# Patient Record
Sex: MALE | Race: WHITE | Employment: OTHER | URBAN - METROPOLITAN AREA
[De-identification: names, ages, dates, MRNs, and addresses within clinical notes are randomized per-mention and may not be internally consistent; named-entity substitution may affect disease eponyms.]

---

## 2017-03-28 PROBLEM — R91.1 PULMONARY NODULE: Status: ACTIVE | Noted: 2017-03-28

## 2018-03-13 ENCOUNTER — PREP FOR PROCEDURE (OUTPATIENT)
Dept: SURGERY | Age: 58
End: 2018-03-13

## 2018-03-13 DIAGNOSIS — M96.1 FAILED BACK SYNDROME: Primary | ICD-10-CM

## 2018-03-15 RX ORDER — SODIUM CHLORIDE 0.9 % (FLUSH) 0.9 %
10 SYRINGE (ML) INJECTION PRN
Status: CANCELLED | OUTPATIENT
Start: 2018-03-15

## 2018-03-15 RX ORDER — SODIUM CHLORIDE 9 MG/ML
INJECTION, SOLUTION INTRAVENOUS CONTINUOUS
Status: CANCELLED | OUTPATIENT
Start: 2018-03-15

## 2018-03-15 RX ORDER — SODIUM CHLORIDE 0.9 % (FLUSH) 0.9 %
10 SYRINGE (ML) INJECTION EVERY 12 HOURS SCHEDULED
Status: CANCELLED | OUTPATIENT
Start: 2018-03-15

## 2018-03-19 DIAGNOSIS — Z76.0 MEDICATION REFILL: ICD-10-CM

## 2018-03-19 DIAGNOSIS — K21.9 GASTROESOPHAGEAL REFLUX DISEASE WITHOUT ESOPHAGITIS: ICD-10-CM

## 2018-03-19 RX ORDER — LORATADINE 10 MG/1
10 TABLET ORAL DAILY
Qty: 90 TABLET | Refills: 1 | Status: SHIPPED | OUTPATIENT
Start: 2018-03-19 | End: 2019-01-14 | Stop reason: SDUPTHER

## 2018-03-19 RX ORDER — RANITIDINE 150 MG/1
150 TABLET ORAL DAILY
Qty: 90 TABLET | Refills: 1 | Status: SHIPPED | OUTPATIENT
Start: 2018-03-19 | End: 2018-08-27 | Stop reason: DRUGHIGH

## 2018-03-19 RX ORDER — SERTRALINE HYDROCHLORIDE 100 MG/1
100 TABLET, FILM COATED ORAL DAILY
Qty: 90 TABLET | Refills: 1 | Status: SHIPPED | OUTPATIENT
Start: 2018-03-19 | End: 2018-08-27 | Stop reason: DRUGHIGH

## 2018-03-19 RX ORDER — SILDENAFIL CITRATE 20 MG/1
TABLET ORAL
Qty: 60 TABLET | Refills: 0 | Status: SHIPPED | OUTPATIENT
Start: 2018-03-19 | End: 2018-04-13 | Stop reason: SDUPTHER

## 2018-03-26 ENCOUNTER — HOSPITAL ENCOUNTER (OUTPATIENT)
Dept: GENERAL RADIOLOGY | Age: 58
Discharge: HOME | End: 2018-03-28
Attending: NEUROLOGICAL SURGERY
Payer: COMMERCIAL

## 2018-03-26 ENCOUNTER — ANESTHESIA EVENT (OUTPATIENT)
Dept: OPERATING ROOM | Age: 58
End: 2018-03-26
Payer: COMMERCIAL

## 2018-03-26 ENCOUNTER — HOSPITAL ENCOUNTER (OUTPATIENT)
Dept: PREADMISSION TESTING | Age: 58
Discharge: HOME | End: 2018-03-26
Payer: COMMERCIAL

## 2018-03-26 VITALS
HEIGHT: 71 IN | WEIGHT: 226 LBS | HEART RATE: 90 BPM | OXYGEN SATURATION: 96 % | DIASTOLIC BLOOD PRESSURE: 70 MMHG | BODY MASS INDEX: 31.64 KG/M2 | TEMPERATURE: 97.5 F | RESPIRATION RATE: 16 BRPM | SYSTOLIC BLOOD PRESSURE: 114 MMHG

## 2018-03-26 DIAGNOSIS — M96.1 FAILED BACK SYNDROME: ICD-10-CM

## 2018-03-26 LAB
ABO/RH: NORMAL
ANION GAP SERPL CALCULATED.3IONS-SCNC: 15 MMOL/L (ref 7–16)
ANTIBODY SCREEN: NORMAL
BASOPHILS ABSOLUTE: 0.05 E9/L (ref 0–0.2)
BASOPHILS RELATIVE PERCENT: 0.7 % (ref 0–2)
BILIRUBIN URINE: NEGATIVE
BLOOD, URINE: NEGATIVE
BUN BLDV-MCNC: 16 MG/DL (ref 6–20)
CALCIUM SERPL-MCNC: 9 MG/DL (ref 8.6–10.2)
CHLORIDE BLD-SCNC: 101 MMOL/L (ref 98–107)
CLARITY: CLEAR
CO2: 27 MMOL/L (ref 22–29)
COLOR: YELLOW
CREAT SERPL-MCNC: 1.1 MG/DL (ref 0.7–1.2)
EKG ATRIAL RATE: 88 BPM
EKG P AXIS: 23 DEGREES
EKG P-R INTERVAL: 152 MS
EKG Q-T INTERVAL: 368 MS
EKG QRS DURATION: 92 MS
EKG QTC CALCULATION (BAZETT): 445 MS
EKG R AXIS: 8 DEGREES
EKG T AXIS: 19 DEGREES
EKG VENTRICULAR RATE: 88 BPM
EOSINOPHILS ABSOLUTE: 0.39 E9/L (ref 0.05–0.5)
EOSINOPHILS RELATIVE PERCENT: 5.3 % (ref 0–6)
GFR AFRICAN AMERICAN: >60
GFR NON-AFRICAN AMERICAN: >60 ML/MIN/1.73
GLUCOSE BLD-MCNC: 89 MG/DL (ref 74–109)
GLUCOSE URINE: NEGATIVE MG/DL
HCT VFR BLD CALC: 44.4 % (ref 37–54)
HEMOGLOBIN: 15.7 G/DL (ref 12.5–16.5)
IMMATURE GRANULOCYTES #: 0.04 E9/L
IMMATURE GRANULOCYTES %: 0.5 % (ref 0–5)
KETONES, URINE: NEGATIVE MG/DL
LEUKOCYTE ESTERASE, URINE: NEGATIVE
LYMPHOCYTES ABSOLUTE: 1.17 E9/L (ref 1.5–4)
LYMPHOCYTES RELATIVE PERCENT: 16 % (ref 20–42)
MCH RBC QN AUTO: 31.5 PG (ref 26–35)
MCHC RBC AUTO-ENTMCNC: 35.4 % (ref 32–34.5)
MCV RBC AUTO: 89 FL (ref 80–99.9)
MONOCYTES ABSOLUTE: 0.77 E9/L (ref 0.1–0.95)
MONOCYTES RELATIVE PERCENT: 10.5 % (ref 2–12)
NEUTROPHILS ABSOLUTE: 4.91 E9/L (ref 1.8–7.3)
NEUTROPHILS RELATIVE PERCENT: 67 % (ref 43–80)
NITRITE, URINE: NEGATIVE
PDW BLD-RTO: 12.5 FL (ref 11.5–15)
PH UA: 6.5 (ref 5–9)
PLATELET # BLD: 324 E9/L (ref 130–450)
PMV BLD AUTO: 8.8 FL (ref 7–12)
POTASSIUM REFLEX MAGNESIUM: 4.2 MMOL/L (ref 3.5–5)
PROTEIN UA: NEGATIVE MG/DL
RBC # BLD: 4.99 E12/L (ref 3.8–5.8)
SODIUM BLD-SCNC: 143 MMOL/L (ref 132–146)
SPECIFIC GRAVITY UA: 1.02 (ref 1–1.03)
UROBILINOGEN, URINE: 0.2 E.U./DL
WBC # BLD: 7.3 E9/L (ref 4.5–11.5)

## 2018-03-26 PROCEDURE — 86901 BLOOD TYPING SEROLOGIC RH(D): CPT

## 2018-03-26 PROCEDURE — 36415 COLL VENOUS BLD VENIPUNCTURE: CPT

## 2018-03-26 PROCEDURE — 81003 URINALYSIS AUTO W/O SCOPE: CPT

## 2018-03-26 PROCEDURE — 80048 BASIC METABOLIC PNL TOTAL CA: CPT

## 2018-03-26 PROCEDURE — 86850 RBC ANTIBODY SCREEN: CPT

## 2018-03-26 PROCEDURE — 85025 COMPLETE CBC W/AUTO DIFF WBC: CPT

## 2018-03-26 PROCEDURE — 86900 BLOOD TYPING SEROLOGIC ABO: CPT

## 2018-03-26 PROCEDURE — 71046 X-RAY EXAM CHEST 2 VIEWS: CPT

## 2018-03-26 RX ORDER — METOPROLOL SUCCINATE 25 MG/1
25 TABLET, EXTENDED RELEASE ORAL NIGHTLY
COMMUNITY
End: 2019-01-14 | Stop reason: SDUPTHER

## 2018-03-26 RX ORDER — NITROGLYCERIN 0.4 MG/1
0.4 TABLET SUBLINGUAL EVERY 5 MIN PRN
COMMUNITY
End: 2018-05-30

## 2018-03-26 ASSESSMENT — PAIN DESCRIPTION - PROGRESSION: CLINICAL_PROGRESSION: GRADUALLY WORSENING

## 2018-03-26 ASSESSMENT — PAIN DESCRIPTION - LOCATION: LOCATION: BACK

## 2018-03-26 ASSESSMENT — PAIN DESCRIPTION - FREQUENCY: FREQUENCY: INTERMITTENT

## 2018-03-26 ASSESSMENT — PAIN DESCRIPTION - DESCRIPTORS: DESCRIPTORS: NUMBNESS;SHARP;THROBBING

## 2018-03-26 ASSESSMENT — PAIN SCALES - GENERAL: PAINLEVEL_OUTOF10: 5

## 2018-03-26 NOTE — PROGRESS NOTES
Shayla 38 PRE-ADMISSION TESTING GENERAL INSTRUCTIONS- [facility]-phone number:[phone]    GENERAL INSTRUCTIONS  [x] Antibacterial Soap shower Night before and/or AM of Surgery  [] Perry wipe instruction sheet and wipes given. [x] Nothing by mouth after midnight, including gum, candy, mints, or water. [x] You may brush your teeth, gargle, but do NOT swallow water. []Hibiclens shower  the night before and the morning of surgery. Do not use             Hibiclens on your face or head. []No smoking, chewing tobacco, illegal drugs, or alcohol within 24 hours of your surgery. [x] Jewelry, valuables or body piercing's should not be brought to the hospital. All body and/or tongue piercing's must be removed prior to arriving to hospital.  ALL hair pins must be removed. [x] Do not wear makeup, lotions, powders, deodorant. Nail polish as directed by the nurse. [x] Arrange transportation to and from the hospital.  Arrange for someone to be with you for the remainder of the day and for 24 hours after your procedure due to having had anesthesia. [] Bring insurance card and photo ID. [x] Transfusion Bracelet: Please bring with you to hospital, day of surgery  [] Bring urine specimen day of surgery. Any small container is acceptable. [] Use inhalers the morning of surgery and bring with you to hospital.   []Bring copy of living will or healthcare power of  papers to be placed in your electronic record. [] CPAP/BI-PAP: Please bring your machine if you are to spend the night in the hospital.     ENDOSCOPY INSTRUCTIONS:   [] Bowel prep instructions reviewed. [] Nothing by mouth after midnight, including gum, candy, mints, or water.  [] You may brush your teeth, gargle, but do NOT swallow water. [] Do not wear makeup, lotions, powders, deodorant. Nail polish as directed by the nurse.   [] Arrange transportation to and from the hospital.  Arrange for someone to be with you for the

## 2018-03-26 NOTE — ANESTHESIA PRE PROCEDURE
(HYDRODIURIL) 12.5 MG tablet Take 1 tablet by mouth daily 30 tablet 5    polyvinyl alcohol (ARTIFICIAL TEARS) 1.4 % ophthalmic solution PLACE 2 DROPS INTO BOTH EYES 3 TIMES A DAY AS NEEDED 15 mL 0    EPINEPHrine (EPIPEN 2-EVANGELISTA) 0.3 MG/0.3ML SOAJ injection Inject 0.3 mLs into the muscle once as needed (anaphylaxis) Use as directed for allergic reaction 1 each 1    ketorolac (ACULAR) 0.5 % ophthalmic solution Place 1 drop into both eyes 4 times daily 1 Bottle 0    hydrocortisone 2.5 % ointment APPLY TOPICALLY TO AFFTECTED ARE TWICE DAILY 28.35 g 3    amphetamine-dextroamphetamine (ADDERALL) 20 MG tablet Take 30 mg by mouth 2 times daily  . No current facility-administered medications for this encounter. Allergies: Allergies   Allergen Reactions    Dye [Iodides] Other (See Comments)     Patient reports father had an anaphylactic reaction to iodide which result in death    Iodine Solution [Povidone Iodine] Rash     TOPICAL AND INJECTED    Topical Sulfur Rash       Problem List:    Patient Active Problem List   Diagnosis Code    Hypercholesteremia E78.00    Depression (emotion) F32.9    Lumbar disc disease with radiculopathy M51.16    Chronic radicular low back pain M54.16, G89.29    Seasonal allergies J30.2    Hypertension I10    Acid reflux K21.9    Left elbow fracture S42.402A    Fracture dislocation of elbow joint S42.409A    Pulmonary nodule R91.1       Past Medical History:        Diagnosis Date    Acute drug-induced gout of left foot 8/29/2015    Had gout attack on 7/18/15. Seems to be HCTZ induced.       ADHD (attention deficit hyperactivity disorder)     Back pain, chronic     Depression     Hyperlipidemia     Hypertension     MRSA (methicillin resistant staph aureus) culture positive     Seasonal allergies        Past Surgical History:        Procedure Laterality Date    APPENDECTOMY  1972    COLONOSCOPY  2/1/2013    sigmoid diverticulosis, Dr. Dawn López, [facility]

## 2018-03-28 ENCOUNTER — APPOINTMENT (OUTPATIENT)
Dept: GENERAL RADIOLOGY | Age: 58
End: 2018-03-28
Attending: NEUROLOGICAL SURGERY
Payer: COMMERCIAL

## 2018-03-28 ENCOUNTER — HOSPITAL ENCOUNTER (OUTPATIENT)
Age: 58
Setting detail: OUTPATIENT SURGERY
Discharge: HOME | End: 2018-03-28
Attending: NEUROLOGICAL SURGERY | Admitting: NEUROLOGICAL SURGERY
Payer: COMMERCIAL

## 2018-03-28 ENCOUNTER — ANESTHESIA (OUTPATIENT)
Dept: OPERATING ROOM | Age: 58
End: 2018-03-28
Payer: COMMERCIAL

## 2018-03-28 VITALS
BODY MASS INDEX: 31.64 KG/M2 | SYSTOLIC BLOOD PRESSURE: 132 MMHG | HEART RATE: 82 BPM | RESPIRATION RATE: 18 BRPM | OXYGEN SATURATION: 97 % | WEIGHT: 226 LBS | DIASTOLIC BLOOD PRESSURE: 76 MMHG | HEIGHT: 71 IN | TEMPERATURE: 97.8 F

## 2018-03-28 VITALS
SYSTOLIC BLOOD PRESSURE: 118 MMHG | RESPIRATION RATE: 8 BRPM | TEMPERATURE: 95.5 F | OXYGEN SATURATION: 100 % | DIASTOLIC BLOOD PRESSURE: 67 MMHG

## 2018-03-28 DIAGNOSIS — G89.29 CHRONIC RADICULAR LOW BACK PAIN: Chronic | ICD-10-CM

## 2018-03-28 DIAGNOSIS — M54.16 CHRONIC RADICULAR LOW BACK PAIN: Chronic | ICD-10-CM

## 2018-03-28 DIAGNOSIS — M54.5 LOW BACK PAIN, UNSPECIFIED BACK PAIN LATERALITY, UNSPECIFIED CHRONICITY, WITH SCIATICA PRESENCE UNSPECIFIED: ICD-10-CM

## 2018-03-28 DIAGNOSIS — M96.1 FAILED BACK SYNDROME: Primary | ICD-10-CM

## 2018-03-28 PROCEDURE — 76000 FLUOROSCOPY <1 HR PHYS/QHP: CPT

## 2018-03-28 PROCEDURE — 6360000002 HC RX W HCPCS: Performed by: PHYSICIAN ASSISTANT

## 2018-03-28 PROCEDURE — 6370000000 HC RX 637 (ALT 250 FOR IP): Performed by: NEUROLOGICAL SURGERY

## 2018-03-28 PROCEDURE — 3600000013 HC SURGERY LEVEL 3 ADDTL 15MIN: Performed by: NEUROLOGICAL SURGERY

## 2018-03-28 PROCEDURE — C1787 PATIENT PROGR, NEUROSTIM: HCPCS | Performed by: NEUROLOGICAL SURGERY

## 2018-03-28 PROCEDURE — 2500000003 HC RX 250 WO HCPCS: Performed by: NEUROLOGICAL SURGERY

## 2018-03-28 PROCEDURE — 3600000003 HC SURGERY LEVEL 3 BASE: Performed by: NEUROLOGICAL SURGERY

## 2018-03-28 PROCEDURE — 88300 SURGICAL PATH GROSS: CPT

## 2018-03-28 PROCEDURE — C1898 LEAD, PMKR, OTHER THAN TRANS: HCPCS | Performed by: NEUROLOGICAL SURGERY

## 2018-03-28 PROCEDURE — 6360000002 HC RX W HCPCS: Performed by: NEUROLOGICAL SURGERY

## 2018-03-28 PROCEDURE — 6360000002 HC RX W HCPCS

## 2018-03-28 PROCEDURE — 2709999900 HC NON-CHARGEABLE SUPPLY: Performed by: NEUROLOGICAL SURGERY

## 2018-03-28 PROCEDURE — 2500000003 HC RX 250 WO HCPCS: Performed by: NURSE ANESTHETIST, CERTIFIED REGISTERED

## 2018-03-28 PROCEDURE — C1751 CATH, INF, PER/CENT/MIDLINE: HCPCS | Performed by: NEUROLOGICAL SURGERY

## 2018-03-28 PROCEDURE — 3700000000 HC ANESTHESIA ATTENDED CARE: Performed by: NEUROLOGICAL SURGERY

## 2018-03-28 PROCEDURE — 63688 REV/RMV IMP SP NPG/R DTCH CN: CPT | Performed by: NEUROLOGICAL SURGERY

## 2018-03-28 PROCEDURE — 3700000001 HC ADD 15 MINUTES (ANESTHESIA): Performed by: NEUROLOGICAL SURGERY

## 2018-03-28 PROCEDURE — 2580000003 HC RX 258: Performed by: NURSE ANESTHETIST, CERTIFIED REGISTERED

## 2018-03-28 PROCEDURE — 6360000002 HC RX W HCPCS: Performed by: NURSE ANESTHETIST, CERTIFIED REGISTERED

## 2018-03-28 PROCEDURE — 63664 REVISE SPINE ELTRD PLATE: CPT | Performed by: NEUROLOGICAL SURGERY

## 2018-03-28 PROCEDURE — 63664 REVISE SPINE ELTRD PLATE: CPT | Performed by: PHYSICIAN ASSISTANT

## 2018-03-28 PROCEDURE — 7100000001 HC PACU RECOVERY - ADDTL 15 MIN: Performed by: NEUROLOGICAL SURGERY

## 2018-03-28 PROCEDURE — 7100000010 HC PHASE II RECOVERY - FIRST 15 MIN: Performed by: NEUROLOGICAL SURGERY

## 2018-03-28 PROCEDURE — 7100000011 HC PHASE II RECOVERY - ADDTL 15 MIN: Performed by: NEUROLOGICAL SURGERY

## 2018-03-28 PROCEDURE — L8689 EXTERNAL RECHARG SYS INTERN: HCPCS | Performed by: NEUROLOGICAL SURGERY

## 2018-03-28 PROCEDURE — 2580000003 HC RX 258: Performed by: PHYSICIAN ASSISTANT

## 2018-03-28 PROCEDURE — 2580000003 HC RX 258: Performed by: NEUROLOGICAL SURGERY

## 2018-03-28 PROCEDURE — C1767 GENERATOR, NEURO NON-RECHARG: HCPCS | Performed by: NEUROLOGICAL SURGERY

## 2018-03-28 PROCEDURE — 6360000002 HC RX W HCPCS: Performed by: ANESTHESIOLOGY

## 2018-03-28 PROCEDURE — C1820 GENERATOR NEURO RECHG BAT SY: HCPCS | Performed by: NEUROLOGICAL SURGERY

## 2018-03-28 PROCEDURE — 2780000010 HC IMPLANT OTHER: Performed by: NEUROLOGICAL SURGERY

## 2018-03-28 PROCEDURE — 7100000000 HC PACU RECOVERY - FIRST 15 MIN: Performed by: NEUROLOGICAL SURGERY

## 2018-03-28 DEVICE — DEVICE NEUROSTIMULATOR 13.9CC W1.9XH2.2IN 29.1GM RECHRG: Type: IMPLANTABLE DEVICE | Site: BACK | Status: FUNCTIONAL

## 2018-03-28 DEVICE — KIT LD L65CM 5-6-5 SPC MRI COMPATIBLE NEUROSTIM FOR CHRONIC: Type: IMPLANTABLE DEVICE | Site: BACK | Status: FUNCTIONAL

## 2018-03-28 RX ORDER — BUPIVACAINE HYDROCHLORIDE 2.5 MG/ML
INJECTION, SOLUTION EPIDURAL; INFILTRATION; INTRACAUDAL PRN
Status: DISCONTINUED | OUTPATIENT
Start: 2018-03-28 | End: 2018-03-28 | Stop reason: HOSPADM

## 2018-03-28 RX ORDER — MORPHINE SULFATE 4 MG/ML
4 INJECTION, SOLUTION INTRAMUSCULAR; INTRAVENOUS
Status: DISCONTINUED | OUTPATIENT
Start: 2018-03-28 | End: 2018-03-28 | Stop reason: HOSPADM

## 2018-03-28 RX ORDER — OXYCODONE HYDROCHLORIDE AND ACETAMINOPHEN 5; 325 MG/1; MG/1
2 TABLET ORAL EVERY 4 HOURS PRN
Qty: 56 TABLET | Refills: 0 | Status: SHIPPED | OUTPATIENT
Start: 2018-03-28 | End: 2018-04-04

## 2018-03-28 RX ORDER — SODIUM CHLORIDE 9 MG/ML
INJECTION, SOLUTION INTRAVENOUS CONTINUOUS
Status: DISCONTINUED | OUTPATIENT
Start: 2018-03-28 | End: 2018-03-28 | Stop reason: HOSPADM

## 2018-03-28 RX ORDER — ROCURONIUM BROMIDE 10 MG/ML
INJECTION, SOLUTION INTRAVENOUS PRN
Status: DISCONTINUED | OUTPATIENT
Start: 2018-03-28 | End: 2018-03-28 | Stop reason: SDUPTHER

## 2018-03-28 RX ORDER — CEPHALEXIN 500 MG/1
500 CAPSULE ORAL 2 TIMES DAILY
Qty: 14 CAPSULE | Refills: 0 | Status: SHIPPED | OUTPATIENT
Start: 2018-03-28 | End: 2018-03-28

## 2018-03-28 RX ORDER — MORPHINE SULFATE 2 MG/ML
1 INJECTION, SOLUTION INTRAMUSCULAR; INTRAVENOUS EVERY 5 MIN PRN
Status: DISCONTINUED | OUTPATIENT
Start: 2018-03-28 | End: 2018-03-28 | Stop reason: HOSPADM

## 2018-03-28 RX ORDER — CEPHALEXIN 500 MG/1
500 CAPSULE ORAL 2 TIMES DAILY
Qty: 14 CAPSULE | Refills: 0 | Status: SHIPPED | OUTPATIENT
Start: 2018-03-28 | End: 2018-04-04

## 2018-03-28 RX ORDER — PROPOFOL 10 MG/ML
INJECTION, EMULSION INTRAVENOUS PRN
Status: DISCONTINUED | OUTPATIENT
Start: 2018-03-28 | End: 2018-03-28 | Stop reason: SDUPTHER

## 2018-03-28 RX ORDER — HYDROMORPHONE HCL 110MG/55ML
0.25 PATIENT CONTROLLED ANALGESIA SYRINGE INTRAVENOUS EVERY 5 MIN PRN
Status: DISCONTINUED | OUTPATIENT
Start: 2018-03-28 | End: 2018-03-28 | Stop reason: HOSPADM

## 2018-03-28 RX ORDER — DEXAMETHASONE SODIUM PHOSPHATE 10 MG/ML
INJECTION, SOLUTION INTRAMUSCULAR; INTRAVENOUS PRN
Status: DISCONTINUED | OUTPATIENT
Start: 2018-03-28 | End: 2018-03-28 | Stop reason: SDUPTHER

## 2018-03-28 RX ORDER — CYCLOBENZAPRINE HCL 10 MG
10 TABLET ORAL 3 TIMES DAILY PRN
Qty: 90 TABLET | Refills: 2 | Status: SHIPPED | OUTPATIENT
Start: 2018-03-28 | End: 2018-04-05 | Stop reason: SDUPTHER

## 2018-03-28 RX ORDER — MIDAZOLAM HYDROCHLORIDE 1 MG/ML
INJECTION INTRAMUSCULAR; INTRAVENOUS PRN
Status: DISCONTINUED | OUTPATIENT
Start: 2018-03-28 | End: 2018-03-28 | Stop reason: SDUPTHER

## 2018-03-28 RX ORDER — MORPHINE SULFATE 2 MG/ML
INJECTION, SOLUTION INTRAMUSCULAR; INTRAVENOUS
Status: COMPLETED
Start: 2018-03-28 | End: 2018-03-28

## 2018-03-28 RX ORDER — HYDROMORPHONE HCL 110MG/55ML
0.5 PATIENT CONTROLLED ANALGESIA SYRINGE INTRAVENOUS EVERY 5 MIN PRN
Status: DISCONTINUED | OUTPATIENT
Start: 2018-03-28 | End: 2018-03-28 | Stop reason: HOSPADM

## 2018-03-28 RX ORDER — NEOSTIGMINE METHYLSULFATE 1 MG/ML
INJECTION, SOLUTION INTRAVENOUS PRN
Status: DISCONTINUED | OUTPATIENT
Start: 2018-03-28 | End: 2018-03-28 | Stop reason: SDUPTHER

## 2018-03-28 RX ORDER — SODIUM CHLORIDE 0.9 % (FLUSH) 0.9 %
10 SYRINGE (ML) INJECTION EVERY 12 HOURS SCHEDULED
Status: DISCONTINUED | OUTPATIENT
Start: 2018-03-28 | End: 2018-03-28 | Stop reason: HOSPADM

## 2018-03-28 RX ORDER — OXYCODONE HYDROCHLORIDE AND ACETAMINOPHEN 5; 325 MG/1; MG/1
2 TABLET ORAL EVERY 4 HOURS PRN
Status: DISCONTINUED | OUTPATIENT
Start: 2018-03-28 | End: 2018-03-28 | Stop reason: HOSPADM

## 2018-03-28 RX ORDER — LIDOCAINE HYDROCHLORIDE 20 MG/ML
INJECTION, SOLUTION EPIDURAL; INFILTRATION; INTRACAUDAL; PERINEURAL PRN
Status: DISCONTINUED | OUTPATIENT
Start: 2018-03-28 | End: 2018-03-28 | Stop reason: SDUPTHER

## 2018-03-28 RX ORDER — SUCCINYLCHOLINE CHLORIDE 20 MG/ML
INJECTION INTRAMUSCULAR; INTRAVENOUS PRN
Status: DISCONTINUED | OUTPATIENT
Start: 2018-03-28 | End: 2018-03-28 | Stop reason: SDUPTHER

## 2018-03-28 RX ORDER — VANCOMYCIN HYDROCHLORIDE 500 MG/10ML
INJECTION, POWDER, LYOPHILIZED, FOR SOLUTION INTRAVENOUS PRN
Status: DISCONTINUED | OUTPATIENT
Start: 2018-03-28 | End: 2018-03-28 | Stop reason: HOSPADM

## 2018-03-28 RX ORDER — SODIUM CHLORIDE 0.9 % (FLUSH) 0.9 %
10 SYRINGE (ML) INJECTION PRN
Status: DISCONTINUED | OUTPATIENT
Start: 2018-03-28 | End: 2018-03-28 | Stop reason: HOSPADM

## 2018-03-28 RX ORDER — ONDANSETRON 2 MG/ML
INJECTION INTRAMUSCULAR; INTRAVENOUS PRN
Status: DISCONTINUED | OUTPATIENT
Start: 2018-03-28 | End: 2018-03-28 | Stop reason: SDUPTHER

## 2018-03-28 RX ORDER — FENTANYL CITRATE 50 UG/ML
INJECTION, SOLUTION INTRAMUSCULAR; INTRAVENOUS PRN
Status: DISCONTINUED | OUTPATIENT
Start: 2018-03-28 | End: 2018-03-28 | Stop reason: SDUPTHER

## 2018-03-28 RX ORDER — GLYCOPYRROLATE 1 MG/5 ML
SYRINGE (ML) INTRAVENOUS PRN
Status: DISCONTINUED | OUTPATIENT
Start: 2018-03-28 | End: 2018-03-28 | Stop reason: SDUPTHER

## 2018-03-28 RX ORDER — SODIUM CHLORIDE, SODIUM LACTATE, POTASSIUM CHLORIDE, CALCIUM CHLORIDE 600; 310; 30; 20 MG/100ML; MG/100ML; MG/100ML; MG/100ML
INJECTION, SOLUTION INTRAVENOUS CONTINUOUS PRN
Status: DISCONTINUED | OUTPATIENT
Start: 2018-03-28 | End: 2018-03-28 | Stop reason: SDUPTHER

## 2018-03-28 RX ORDER — MORPHINE SULFATE 2 MG/ML
2 INJECTION, SOLUTION INTRAMUSCULAR; INTRAVENOUS EVERY 5 MIN PRN
Status: DISCONTINUED | OUTPATIENT
Start: 2018-03-28 | End: 2018-03-28 | Stop reason: HOSPADM

## 2018-03-28 RX ADMIN — DEXAMETHASONE SODIUM PHOSPHATE 10 MG: 10 INJECTION, SOLUTION INTRAMUSCULAR; INTRAVENOUS at 06:58

## 2018-03-28 RX ADMIN — LIDOCAINE HYDROCHLORIDE 100 MG: 20 INJECTION, SOLUTION EPIDURAL; INFILTRATION; INTRACAUDAL; PERINEURAL at 06:36

## 2018-03-28 RX ADMIN — Medication 0.2 MG: at 06:36

## 2018-03-28 RX ADMIN — Medication 160 MG: at 06:36

## 2018-03-28 RX ADMIN — Medication 0.6 MG: at 08:03

## 2018-03-28 RX ADMIN — ROCURONIUM BROMIDE 10 MG: 10 INJECTION, SOLUTION INTRAVENOUS at 06:36

## 2018-03-28 RX ADMIN — MORPHINE SULFATE 2 MG: 2 INJECTION, SOLUTION INTRAMUSCULAR; INTRAVENOUS at 09:03

## 2018-03-28 RX ADMIN — PHENYLEPHRINE HYDROCHLORIDE 50 MCG: 10 INJECTION INTRAMUSCULAR; INTRAVENOUS; SUBCUTANEOUS at 08:01

## 2018-03-28 RX ADMIN — OXYCODONE HYDROCHLORIDE AND ACETAMINOPHEN 2 TABLET: 5; 325 TABLET ORAL at 10:50

## 2018-03-28 RX ADMIN — MORPHINE SULFATE 2 MG: 2 INJECTION, SOLUTION INTRAMUSCULAR; INTRAVENOUS at 09:08

## 2018-03-28 RX ADMIN — FENTANYL CITRATE 50 MCG: 50 INJECTION, SOLUTION INTRAMUSCULAR; INTRAVENOUS at 07:07

## 2018-03-28 RX ADMIN — SODIUM CHLORIDE, POTASSIUM CHLORIDE, SODIUM LACTATE AND CALCIUM CHLORIDE: 600; 310; 30; 20 INJECTION, SOLUTION INTRAVENOUS at 07:45

## 2018-03-28 RX ADMIN — PHENYLEPHRINE HYDROCHLORIDE 50 MCG: 10 INJECTION INTRAMUSCULAR; INTRAVENOUS; SUBCUTANEOUS at 07:42

## 2018-03-28 RX ADMIN — SODIUM CHLORIDE: 9 INJECTION, SOLUTION INTRAVENOUS at 05:48

## 2018-03-28 RX ADMIN — MIDAZOLAM HYDROCHLORIDE 2 MG: 1 INJECTION, SOLUTION INTRAMUSCULAR; INTRAVENOUS at 06:25

## 2018-03-28 RX ADMIN — MORPHINE SULFATE 2 MG: 2 INJECTION, SOLUTION INTRAMUSCULAR; INTRAVENOUS at 09:30

## 2018-03-28 RX ADMIN — CEFAZOLIN SODIUM 2 G: 2 SOLUTION INTRAVENOUS at 06:36

## 2018-03-28 RX ADMIN — Medication 3 MG: at 08:03

## 2018-03-28 RX ADMIN — ROCURONIUM BROMIDE 20 MG: 10 INJECTION, SOLUTION INTRAVENOUS at 07:07

## 2018-03-28 RX ADMIN — ONDANSETRON 4 MG: 2 INJECTION INTRAMUSCULAR; INTRAVENOUS at 07:49

## 2018-03-28 RX ADMIN — FENTANYL CITRATE 100 MCG: 50 INJECTION, SOLUTION INTRAMUSCULAR; INTRAVENOUS at 06:36

## 2018-03-28 RX ADMIN — PROPOFOL 200 MG: 10 INJECTION, EMULSION INTRAVENOUS at 06:36

## 2018-03-28 ASSESSMENT — PULMONARY FUNCTION TESTS
PIF_VALUE: 29
PIF_VALUE: 27
PIF_VALUE: 38
PIF_VALUE: 28
PIF_VALUE: 27
PIF_VALUE: 29
PIF_VALUE: 3
PIF_VALUE: 26
PIF_VALUE: 27
PIF_VALUE: 18
PIF_VALUE: 27
PIF_VALUE: 25
PIF_VALUE: 28
PIF_VALUE: 20
PIF_VALUE: 26
PIF_VALUE: 29
PIF_VALUE: 29
PIF_VALUE: 28
PIF_VALUE: 25
PIF_VALUE: 28
PIF_VALUE: 28
PIF_VALUE: 27
PIF_VALUE: 23
PIF_VALUE: 29
PIF_VALUE: 24
PIF_VALUE: 25
PIF_VALUE: 1
PIF_VALUE: 3
PIF_VALUE: 24
PIF_VALUE: 2
PIF_VALUE: 26
PIF_VALUE: 27
PIF_VALUE: 27
PIF_VALUE: 25
PIF_VALUE: 26
PIF_VALUE: 27
PIF_VALUE: 27
PIF_VALUE: 23
PIF_VALUE: 27
PIF_VALUE: 28
PIF_VALUE: 29
PIF_VALUE: 29
PIF_VALUE: 27
PIF_VALUE: 19
PIF_VALUE: 29
PIF_VALUE: 28
PIF_VALUE: 2
PIF_VALUE: 0
PIF_VALUE: 29
PIF_VALUE: 22
PIF_VALUE: 30
PIF_VALUE: 27
PIF_VALUE: 29
PIF_VALUE: 27
PIF_VALUE: 27
PIF_VALUE: 29
PIF_VALUE: 2
PIF_VALUE: 29
PIF_VALUE: 28
PIF_VALUE: 3
PIF_VALUE: 23
PIF_VALUE: 27
PIF_VALUE: 28
PIF_VALUE: 27
PIF_VALUE: 29
PIF_VALUE: 29
PIF_VALUE: 23
PIF_VALUE: 27
PIF_VALUE: 2
PIF_VALUE: 27
PIF_VALUE: 1
PIF_VALUE: 29
PIF_VALUE: 29
PIF_VALUE: 24
PIF_VALUE: 27
PIF_VALUE: 28
PIF_VALUE: 28
PIF_VALUE: 26
PIF_VALUE: 22
PIF_VALUE: 26
PIF_VALUE: 26
PIF_VALUE: 28
PIF_VALUE: 29
PIF_VALUE: 27
PIF_VALUE: 29
PIF_VALUE: 26
PIF_VALUE: 23
PIF_VALUE: 29
PIF_VALUE: 26
PIF_VALUE: 27
PIF_VALUE: 28
PIF_VALUE: 27
PIF_VALUE: 29
PIF_VALUE: 27
PIF_VALUE: 24
PIF_VALUE: 29
PIF_VALUE: 26
PIF_VALUE: 28
PIF_VALUE: 24
PIF_VALUE: 28
PIF_VALUE: 0
PIF_VALUE: 24
PIF_VALUE: 28
PIF_VALUE: 26
PIF_VALUE: 27
PIF_VALUE: 28
PIF_VALUE: 24
PIF_VALUE: 26
PIF_VALUE: 29
PIF_VALUE: 27
PIF_VALUE: 24
PIF_VALUE: 29
PIF_VALUE: 29
PIF_VALUE: 0
PIF_VALUE: 27

## 2018-03-28 ASSESSMENT — PAIN SCALES - GENERAL
PAINLEVEL_OUTOF10: 7
PAINLEVEL_OUTOF10: 6
PAINLEVEL_OUTOF10: 8
PAINLEVEL_OUTOF10: 5
PAINLEVEL_OUTOF10: 6
PAINLEVEL_OUTOF10: 6
PAINLEVEL_OUTOF10: 0
PAINLEVEL_OUTOF10: 8
PAINLEVEL_OUTOF10: 5
PAINLEVEL_OUTOF10: 0

## 2018-03-28 ASSESSMENT — PAIN DESCRIPTION - PAIN TYPE
TYPE: SURGICAL PAIN

## 2018-03-28 ASSESSMENT — PAIN DESCRIPTION - DESCRIPTORS
DESCRIPTORS: DULL;TINGLING
DESCRIPTORS: ACHING;BURNING;DISCOMFORT
DESCRIPTORS: ACHING;CRAMPING
DESCRIPTORS: ACHING;BURNING;CONSTANT
DESCRIPTORS: ACHING;CRAMPING
DESCRIPTORS: ACHING;CRAMPING

## 2018-03-28 ASSESSMENT — PAIN DESCRIPTION - ORIENTATION
ORIENTATION: MID

## 2018-03-28 ASSESSMENT — PAIN DESCRIPTION - LOCATION
LOCATION: BACK

## 2018-03-28 ASSESSMENT — PAIN - FUNCTIONAL ASSESSMENT: PAIN_FUNCTIONAL_ASSESSMENT: 0-10

## 2018-03-28 ASSESSMENT — PAIN DESCRIPTION - FREQUENCY
FREQUENCY: CONTINUOUS
FREQUENCY: CONTINUOUS

## 2018-03-28 NOTE — OP NOTE
[address]                   [address] [facility],  [facility]                                 OPERATIVE REPORT    PATIENT NAME: Tisha Hampton                    :        1960  MED REC NO:   [id number]                            ROOM:  ACCOUNT NO:   [de-identified]                           ADMIT DATE: 2018  PROVIDER:     Linda Champion MD    DATE OF PROCEDURE:  2018    PREOPERATIVE DIAGNOSES:  1. Chronic back pain. 2.  Failed back surgery syndrome. 3.  Malfunctioning spinal cord stimulator. POSTOPERATIVE DIAGNOSES:  1. Chronic back pain. 2.  Failed back surgery syndrome. 3.  Malfunctioning spinal cord stimulator. OPERATIVE PROCEDURE:  1. Removal of previously placed T7-T8 Medtronic spinal cord stimulator  paddle electrode. 2.  Removal of left gluteal implantable programmable generator (battery). 3.  Replacement of new T7-T8 Medtronic spinal cord stimulator paddle  electrode. 4.  Placement of new implantable programmable generator (battery). 5.  Use of intraoperative fluoroscopy, interpreted by myself, the surgeon. 6.  Programming and checking of impedances of Medtronic spinal cord  stimulator. ANESTHESIA:  Generalized endotracheal anesthesia. SURGEON:  Linda Champion MD    ASSISTANT:  Batsheva Armstrong PA-C    COMPLICATIONS:  None. ESTIMATED BLOOD LOSS:  50 mL. SPECIMENS:  Old spinal cord stimulator hardware. OPERATIVE INDICATIONS:  The patient is a 68-year-old gentleman who  presented to the office after he was found to have poor coverage from his  spinal cord stimulator that he had previously placed several years ago. The impedances were checked on the lead and it appeared that he had a lead  malfunction.   Of note, his battery had also been depleted and reached the  end of life; and after risks, benefits, and alternatives were discussed  with the patient it was determined that he would undergo the above-listed  procedure. OPERATIVE PROCEDURE:  The patient was brought into the operating room. A  time-out was performed where he was identified by his name, medical record  number, and the operative procedure, which he was about to undergo. Next,  induction of generalized endotracheal anesthesia was then commenced. Upon  completion of induction of generalized endotracheal anesthesia, he received  preoperative antibiotics. He was flipped into prone position on a Aubrey  table. All pressure points were padded. His thoracic and left gluteal  regions were then prepped and draped in the usual sterile fashion. Next, I  used #10 blade to open up the thoracic incision, monopolar cautery was used  to dissect through the subcutaneous tissue. I then placed a self-retaining  Weitlaner retractor into the wound. Next, I then proceeded to identify  what was left in the spinous process of T9. I then used the Leksell  rongeur to bite up the spinous process at T9. I then used a high-speed  skyla to thin out the lamina bilaterally at T9, and once I thinned out the  lamina at T9 bilaterally, I used #3 Kerrison punches to then perform  bilateral T9 laminectomy. After T9 laminectomy was completed, I was able to identify the spinal cord  stimulator paddle electrode, I used a Penfield #4 to then separate it from  the dura. I was then able to slide it out. Once I slid out and removed  the paddle electrode, I then proceeded to then open the left gluteal  incision with a #10 blade. Monopolar cautery was used to dissect through  the subcutaneous tissue. I then identified the previously placed battery,  I then cut the leads from the battery. I then pulled out the paddle  electrode from the thoracic incision and removed the battery out of the  pocket. I then proceeded to then insert a dural dissector in the epidural  space.   I then proceeded to then place a new Medtronic paddle electrode in  the epidural space at T9 to

## 2018-03-28 NOTE — BRIEF OP NOTE
Brief Postoperative Note  ______________________________________________________________    Patient: Donald Baca  YOB: 1960  MRN: [id number]  Date of Procedure: 3/28/2018    Pre-Op Diagnosis: FAILED BACK SYNDROME, SPINAL CORD STIM. MALFUCTION     Post-Op Diagnosis: Same       Procedure(s):  REMOVAL OF T7 - T8 MALFUCTIONING  STIMULATOR, REPLACEMENT OF T7- T8 SPINAL CORD STIMULATOR --LIDIA, CASTILLO TABLE, MEDTRONIC    Anesthesia: General    Surgeon(s):  Terrence Min MD    Staff:  Jessie Scrub: Anabella Veras  Scrub Person First: Florecita Wright  Physician Assistant: SARA Varner     Estimated Blood Loss: 50 (units unknown) mL    Complications: None    Specimens:   ID Type Source Tests Collected by Time Destination   A : OLD STIMULATOR  Hardware Back SURGICAL PATHOLOGY Terrence Min MD 3/28/2018 7994        Implants:    Implant Name Type Inv.  Item Serial No.  Lot No. LRB No. Used   LEAD MRI SURESCAN 65CM Spine:Stimulator LEAD MRI SURESCAN 65CM  [employer] [place] [employer] [id number] N/A 1   STIMULATOR INTELLIS ADAPTIVE STIM MRI - [id number] Spine:Stimulator STIMULATOR INTELLIS ADAPTIVE STIM MRI [id number] [employer]TR[employer] [place] [employer]   N/A 1         Drains:      Findings: see dictated op note    Abhi Carranza MD  Date: 3/28/2018  Time: 9:10 AM

## 2018-04-02 ENCOUNTER — HOSPITAL ENCOUNTER (OUTPATIENT)
Age: 58
Discharge: HOME | End: 2018-04-04
Payer: COMMERCIAL

## 2018-04-02 ENCOUNTER — OFFICE VISIT (OUTPATIENT)
Dept: FAMILY MEDICINE CLINIC | Age: 58
End: 2018-04-02
Payer: COMMERCIAL

## 2018-04-02 VITALS
SYSTOLIC BLOOD PRESSURE: 130 MMHG | TEMPERATURE: 98.4 F | DIASTOLIC BLOOD PRESSURE: 79 MMHG | OXYGEN SATURATION: 98 % | BODY MASS INDEX: 33.88 KG/M2 | WEIGHT: 242 LBS | HEIGHT: 71 IN | HEART RATE: 88 BPM

## 2018-04-02 DIAGNOSIS — R53.83 FATIGUE, UNSPECIFIED TYPE: ICD-10-CM

## 2018-04-02 DIAGNOSIS — E78.00 HYPERCHOLESTEREMIA: Primary | Chronic | ICD-10-CM

## 2018-04-02 DIAGNOSIS — I10 ESSENTIAL HYPERTENSION: ICD-10-CM

## 2018-04-02 LAB
HCT VFR BLD CALC: 42.2 % (ref 37–54)
HEMOGLOBIN: 14.1 G/DL (ref 12.5–16.5)
MCH RBC QN AUTO: 31.1 PG (ref 26–35)
MCHC RBC AUTO-ENTMCNC: 33.4 % (ref 32–34.5)
MCV RBC AUTO: 93 FL (ref 80–99.9)
PDW BLD-RTO: 12.6 FL (ref 11.5–15)
PLATELET # BLD: 345 E9/L (ref 130–450)
PMV BLD AUTO: 9.8 FL (ref 7–12)
RBC # BLD: 4.54 E12/L (ref 3.8–5.8)
WBC # BLD: 7.4 E9/L (ref 4.5–11.5)

## 2018-04-02 PROCEDURE — 85027 COMPLETE CBC AUTOMATED: CPT

## 2018-04-02 PROCEDURE — 3017F COLORECTAL CA SCREEN DOC REV: CPT | Performed by: FAMILY MEDICINE

## 2018-04-02 PROCEDURE — 36415 COLL VENOUS BLD VENIPUNCTURE: CPT

## 2018-04-02 PROCEDURE — G8427 DOCREV CUR MEDS BY ELIG CLIN: HCPCS | Performed by: FAMILY MEDICINE

## 2018-04-02 PROCEDURE — 1036F TOBACCO NON-USER: CPT | Performed by: FAMILY MEDICINE

## 2018-04-02 PROCEDURE — 99213 OFFICE O/P EST LOW 20 MIN: CPT | Performed by: FAMILY MEDICINE

## 2018-04-02 PROCEDURE — 99212 OFFICE O/P EST SF 10 MIN: CPT | Performed by: FAMILY MEDICINE

## 2018-04-02 PROCEDURE — G8417 CALC BMI ABV UP PARAM F/U: HCPCS | Performed by: FAMILY MEDICINE

## 2018-04-02 RX ORDER — SILDENAFIL CITRATE 20 MG/1
TABLET ORAL
Qty: 60 TABLET | Refills: 0 | Status: CANCELLED | OUTPATIENT
Start: 2018-04-02

## 2018-04-02 ASSESSMENT — ENCOUNTER SYMPTOMS
CONSTIPATION: 0
ABDOMINAL PAIN: 0
VOMITING: 0
SHORTNESS OF BREATH: 0
COUGH: 0
DIARRHEA: 0
NAUSEA: 0
BACK PAIN: 1

## 2018-04-05 ENCOUNTER — HOSPITAL ENCOUNTER (OUTPATIENT)
Dept: CT IMAGING | Age: 58
Discharge: HOME | End: 2018-04-07
Payer: COMMERCIAL

## 2018-04-05 ENCOUNTER — OFFICE VISIT (OUTPATIENT)
Dept: NEUROSURGERY | Age: 58
End: 2018-04-05

## 2018-04-05 DIAGNOSIS — M54.40 ACUTE RIGHT-SIDED LOW BACK PAIN WITH SCIATICA, SCIATICA LATERALITY UNSPECIFIED: Primary | ICD-10-CM

## 2018-04-05 DIAGNOSIS — R91.1 PULMONARY NODULE: ICD-10-CM

## 2018-04-05 PROCEDURE — 71250 CT THORAX DX C-: CPT

## 2018-04-05 PROCEDURE — 99024 POSTOP FOLLOW-UP VISIT: CPT | Performed by: PHYSICIAN ASSISTANT

## 2018-04-05 RX ORDER — CYCLOBENZAPRINE HCL 10 MG
10 TABLET ORAL 3 TIMES DAILY PRN
Qty: 90 TABLET | Refills: 2 | Status: SHIPPED | OUTPATIENT
Start: 2018-04-05 | End: 2019-01-14 | Stop reason: CLARIF

## 2018-04-05 RX ORDER — CEPHALEXIN 500 MG/1
500 CAPSULE ORAL 4 TIMES DAILY
Qty: 28 CAPSULE | Refills: 0 | Status: SHIPPED | OUTPATIENT
Start: 2018-04-05 | End: 2018-08-27 | Stop reason: CLARIF

## 2018-04-05 RX ORDER — OXYCODONE HYDROCHLORIDE AND ACETAMINOPHEN 5; 325 MG/1; MG/1
1 TABLET ORAL EVERY 4 HOURS PRN
Qty: 120 TABLET | Refills: 0 | Status: SHIPPED | OUTPATIENT
Start: 2018-04-05 | End: 2018-04-23 | Stop reason: SDUPTHER

## 2018-04-13 DIAGNOSIS — Z76.0 MEDICATION REFILL: Primary | ICD-10-CM

## 2018-04-13 RX ORDER — SILDENAFIL CITRATE 20 MG/1
TABLET ORAL
Qty: 60 TABLET | Refills: 0 | Status: SHIPPED | OUTPATIENT
Start: 2018-04-13 | End: 2018-05-30 | Stop reason: SDUPTHER

## 2018-04-13 RX ORDER — IBUPROFEN 800 MG/1
800 TABLET ORAL EVERY 8 HOURS PRN
Qty: 30 TABLET | Refills: 1 | Status: SHIPPED | OUTPATIENT
Start: 2018-04-13 | End: 2018-05-30 | Stop reason: SDUPTHER

## 2018-04-13 RX ORDER — LACTOBACILL 46/B.ANIMAL/INULIN 10B-100 MG
CAPSULE ORAL
Qty: 30 CAPSULE | Refills: 3 | Status: SHIPPED | OUTPATIENT
Start: 2018-04-13 | End: 2018-08-20 | Stop reason: SDUPTHER

## 2018-04-23 ENCOUNTER — OFFICE VISIT (OUTPATIENT)
Dept: NEUROSURGERY | Age: 58
End: 2018-04-23

## 2018-04-23 VITALS
HEIGHT: 71 IN | HEART RATE: 106 BPM | WEIGHT: 231 LBS | DIASTOLIC BLOOD PRESSURE: 83 MMHG | SYSTOLIC BLOOD PRESSURE: 130 MMHG | BODY MASS INDEX: 32.34 KG/M2

## 2018-04-23 DIAGNOSIS — M54.40 ACUTE RIGHT-SIDED LOW BACK PAIN WITH SCIATICA, SCIATICA LATERALITY UNSPECIFIED: Primary | ICD-10-CM

## 2018-04-23 PROCEDURE — 99024 POSTOP FOLLOW-UP VISIT: CPT | Performed by: PHYSICIAN ASSISTANT

## 2018-04-23 RX ORDER — OXYCODONE HYDROCHLORIDE AND ACETAMINOPHEN 5; 325 MG/1; MG/1
1 TABLET ORAL EVERY 4 HOURS PRN
Qty: 90 TABLET | Refills: 0 | Status: SHIPPED | OUTPATIENT
Start: 2018-04-23 | End: 2018-08-27

## 2018-05-30 RX ORDER — SILDENAFIL CITRATE 20 MG/1
TABLET ORAL
Qty: 60 TABLET | Refills: 0 | Status: SHIPPED
Start: 2018-05-30 | End: 2021-08-12 | Stop reason: CLARIF

## 2018-05-30 RX ORDER — CEPHALEXIN 500 MG/1
500 CAPSULE ORAL 4 TIMES DAILY
Qty: 28 CAPSULE | Refills: 0 | OUTPATIENT
Start: 2018-05-30

## 2018-05-30 RX ORDER — IBUPROFEN 800 MG/1
800 TABLET ORAL EVERY 8 HOURS PRN
Qty: 30 TABLET | Refills: 1 | Status: SHIPPED | OUTPATIENT
Start: 2018-05-30 | End: 2018-08-27 | Stop reason: SDUPTHER

## 2018-06-07 ENCOUNTER — OFFICE VISIT (OUTPATIENT)
Dept: NEUROSURGERY | Age: 58
End: 2018-06-07

## 2018-06-07 VITALS
DIASTOLIC BLOOD PRESSURE: 87 MMHG | HEIGHT: 71 IN | BODY MASS INDEX: 32.62 KG/M2 | SYSTOLIC BLOOD PRESSURE: 130 MMHG | WEIGHT: 233 LBS | HEART RATE: 93 BPM

## 2018-06-07 DIAGNOSIS — M54.16 LUMBAR RADICULOPATHY: Primary | ICD-10-CM

## 2018-06-07 PROCEDURE — 99024 POSTOP FOLLOW-UP VISIT: CPT | Performed by: PHYSICIAN ASSISTANT

## 2018-08-20 RX ORDER — LACTOBACILL 46/B.ANIMAL/INULIN 10B-100 MG
CAPSULE ORAL
Qty: 30 CAPSULE | Refills: 0 | Status: SHIPPED | OUTPATIENT
Start: 2018-08-20 | End: 2018-08-27 | Stop reason: SDUPTHER

## 2018-08-26 PROBLEM — G89.29 CHRONIC BACK PAIN: Status: ACTIVE | Noted: 2018-08-26

## 2018-08-26 PROBLEM — M54.9 CHRONIC BACK PAIN: Status: ACTIVE | Noted: 2018-08-26

## 2018-08-27 ENCOUNTER — HOSPITAL ENCOUNTER (OUTPATIENT)
Age: 58
Discharge: HOME | End: 2018-08-29
Payer: COMMERCIAL

## 2018-08-27 ENCOUNTER — OFFICE VISIT (OUTPATIENT)
Dept: FAMILY MEDICINE CLINIC | Age: 58
End: 2018-08-27
Payer: COMMERCIAL

## 2018-08-27 VITALS
SYSTOLIC BLOOD PRESSURE: 138 MMHG | RESPIRATION RATE: 20 BRPM | HEART RATE: 99 BPM | WEIGHT: 228 LBS | BODY MASS INDEX: 31.92 KG/M2 | HEIGHT: 71 IN | OXYGEN SATURATION: 98 % | DIASTOLIC BLOOD PRESSURE: 78 MMHG

## 2018-08-27 DIAGNOSIS — I10 ESSENTIAL HYPERTENSION: Primary | ICD-10-CM

## 2018-08-27 DIAGNOSIS — J30.2 SEASONAL ALLERGIC RHINITIS, UNSPECIFIED TRIGGER: ICD-10-CM

## 2018-08-27 DIAGNOSIS — K59.00 CONSTIPATION, UNSPECIFIED CONSTIPATION TYPE: ICD-10-CM

## 2018-08-27 DIAGNOSIS — Z76.0 MEDICATION REFILL: ICD-10-CM

## 2018-08-27 DIAGNOSIS — E78.2 MIXED HYPERLIPIDEMIA: Chronic | ICD-10-CM

## 2018-08-27 DIAGNOSIS — K21.9 GASTROESOPHAGEAL REFLUX DISEASE WITHOUT ESOPHAGITIS: ICD-10-CM

## 2018-08-27 DIAGNOSIS — R53.83 FATIGUE, UNSPECIFIED TYPE: ICD-10-CM

## 2018-08-27 DIAGNOSIS — I10 ESSENTIAL HYPERTENSION: ICD-10-CM

## 2018-08-27 DIAGNOSIS — E55.9 VITAMIN D DEFICIENCY: ICD-10-CM

## 2018-08-27 LAB
ALBUMIN SERPL-MCNC: 4.4 G/DL (ref 3.5–5.2)
ALP BLD-CCNC: 73 U/L (ref 40–129)
ALT SERPL-CCNC: 33 U/L (ref 0–40)
ANION GAP SERPL CALCULATED.3IONS-SCNC: 15 MMOL/L (ref 7–16)
AST SERPL-CCNC: 28 U/L (ref 0–39)
BILIRUB SERPL-MCNC: 0.4 MG/DL (ref 0–1.2)
BUN BLDV-MCNC: 11 MG/DL (ref 6–20)
CALCIUM SERPL-MCNC: 9.4 MG/DL (ref 8.6–10.2)
CHLORIDE BLD-SCNC: 104 MMOL/L (ref 98–107)
CHOLESTEROL, TOTAL: 152 MG/DL (ref 0–199)
CO2: 26 MMOL/L (ref 22–29)
CREAT SERPL-MCNC: 1.1 MG/DL (ref 0.7–1.2)
GFR AFRICAN AMERICAN: >60
GFR NON-AFRICAN AMERICAN: >60 ML/MIN/1.73
GLUCOSE BLD-MCNC: 86 MG/DL (ref 74–109)
HCT VFR BLD CALC: 45.1 % (ref 37–54)
HDLC SERPL-MCNC: 42 MG/DL
HEMOGLOBIN: 15.3 G/DL (ref 12.5–16.5)
LDL CHOLESTEROL CALCULATED: 79 MG/DL (ref 0–99)
MCH RBC QN AUTO: 30.7 PG (ref 26–35)
MCHC RBC AUTO-ENTMCNC: 33.9 % (ref 32–34.5)
MCV RBC AUTO: 90.6 FL (ref 80–99.9)
PDW BLD-RTO: 12.7 FL (ref 11.5–15)
PLATELET # BLD: 303 E9/L (ref 130–450)
PMV BLD AUTO: 9.7 FL (ref 7–12)
POTASSIUM SERPL-SCNC: 4.3 MMOL/L (ref 3.5–5)
RBC # BLD: 4.98 E12/L (ref 3.8–5.8)
SODIUM BLD-SCNC: 145 MMOL/L (ref 132–146)
TOTAL PROTEIN: 7.5 G/DL (ref 6.4–8.3)
TRIGL SERPL-MCNC: 156 MG/DL (ref 0–149)
TSH SERPL DL<=0.05 MIU/L-ACNC: 2.47 UIU/ML (ref 0.27–4.2)
VITAMIN D 25-HYDROXY: 46 NG/ML (ref 30–100)
VLDLC SERPL CALC-MCNC: 31 MG/DL
WBC # BLD: 7.2 E9/L (ref 4.5–11.5)

## 2018-08-27 PROCEDURE — 80053 COMPREHEN METABOLIC PANEL: CPT

## 2018-08-27 PROCEDURE — G8427 DOCREV CUR MEDS BY ELIG CLIN: HCPCS | Performed by: FAMILY MEDICINE

## 2018-08-27 PROCEDURE — 36415 COLL VENOUS BLD VENIPUNCTURE: CPT

## 2018-08-27 PROCEDURE — 3017F COLORECTAL CA SCREEN DOC REV: CPT | Performed by: FAMILY MEDICINE

## 2018-08-27 PROCEDURE — 99213 OFFICE O/P EST LOW 20 MIN: CPT | Performed by: FAMILY MEDICINE

## 2018-08-27 PROCEDURE — G8417 CALC BMI ABV UP PARAM F/U: HCPCS | Performed by: FAMILY MEDICINE

## 2018-08-27 PROCEDURE — 82306 VITAMIN D 25 HYDROXY: CPT

## 2018-08-27 PROCEDURE — 1036F TOBACCO NON-USER: CPT | Performed by: FAMILY MEDICINE

## 2018-08-27 PROCEDURE — 80061 LIPID PANEL: CPT

## 2018-08-27 PROCEDURE — 85027 COMPLETE CBC AUTOMATED: CPT

## 2018-08-27 PROCEDURE — 84443 ASSAY THYROID STIM HORMONE: CPT

## 2018-08-27 RX ORDER — SERTRALINE HYDROCHLORIDE 100 MG/1
100 TABLET, FILM COATED ORAL DAILY
Qty: 90 TABLET | Refills: 1 | Status: CANCELLED | OUTPATIENT
Start: 2018-08-27

## 2018-08-27 RX ORDER — FLUTICASONE PROPIONATE 50 MCG
SPRAY, SUSPENSION (ML) NASAL
Qty: 16 G | Refills: 0 | Status: SHIPPED | OUTPATIENT
Start: 2018-08-27 | End: 2018-10-22 | Stop reason: SDUPTHER

## 2018-08-27 RX ORDER — LISINOPRIL 20 MG/1
20 TABLET ORAL NIGHTLY
Qty: 30 TABLET | Refills: 0 | Status: SHIPPED
Start: 2018-08-27 | End: 2018-10-22 | Stop reason: SDUPTHER

## 2018-08-27 RX ORDER — KETOROLAC TROMETHAMINE 5 MG/ML
1 SOLUTION OPHTHALMIC 4 TIMES DAILY
Qty: 1 BOTTLE | Refills: 0 | Status: CANCELLED | OUTPATIENT
Start: 2018-08-27

## 2018-08-27 RX ORDER — ACETAMINOPHEN 160 MG
1 TABLET,DISINTEGRATING ORAL DAILY
Qty: 30 CAPSULE | Refills: 5 | Status: SHIPPED | OUTPATIENT
Start: 2018-08-27 | End: 2019-03-11 | Stop reason: SDUPTHER

## 2018-08-27 RX ORDER — IBUPROFEN 800 MG/1
800 TABLET ORAL EVERY 8 HOURS PRN
Qty: 30 TABLET | Refills: 3 | Status: SHIPPED | OUTPATIENT
Start: 2018-08-27 | End: 2019-01-14 | Stop reason: SDUPTHER

## 2018-08-27 RX ORDER — DOCUSATE SODIUM 100 MG/1
100 CAPSULE, LIQUID FILLED ORAL DAILY PRN
Qty: 30 CAPSULE | Refills: 5 | Status: SHIPPED | OUTPATIENT
Start: 2018-08-27 | End: 2019-03-11 | Stop reason: SDUPTHER

## 2018-08-27 RX ORDER — RANITIDINE 150 MG/1
150 TABLET ORAL NIGHTLY
Qty: 30 TABLET | Refills: 0 | Status: SHIPPED
Start: 2018-08-27 | End: 2019-04-30 | Stop reason: SDUPTHER

## 2018-08-27 RX ORDER — LACTOBACILL 46/B.ANIMAL/INULIN 10B-100 MG
CAPSULE ORAL
Qty: 30 CAPSULE | Refills: 5 | Status: SHIPPED | OUTPATIENT
Start: 2018-08-27 | End: 2019-03-11 | Stop reason: SDUPTHER

## 2018-08-27 RX ORDER — SERTRALINE HYDROCHLORIDE 100 MG/1
100 TABLET, FILM COATED ORAL NIGHTLY
Qty: 30 TABLET | Refills: 0 | Status: SHIPPED
Start: 2018-08-27 | End: 2019-01-14 | Stop reason: CLARIF

## 2018-08-27 ASSESSMENT — ENCOUNTER SYMPTOMS
ABDOMINAL PAIN: 0
SHORTNESS OF BREATH: 0
CONSTIPATION: 1
COUGH: 0
BACK PAIN: 1
NAUSEA: 0
RHINORRHEA: 0
DIARRHEA: 0
VOMITING: 0

## 2018-08-27 NOTE — PATIENT INSTRUCTIONS
Patient Education        Fatigue: Care Instructions  Your Care Instructions    Fatigue is a feeling of tiredness, exhaustion, or lack of energy. You may feel fatigue because of too much or not enough activity. It can also come from stress, lack of sleep, boredom, and poor diet. Many medical problems, such as viral infections, can cause fatigue. Emotional problems, especially depression, are often the cause of fatigue. Fatigue is most often a symptom of another problem. Treatment for fatigue depends on the cause. For example, if you have fatigue because you have a certain health problem, treating this problem also treats your fatigue. If depression or anxiety is the cause, treatment may help. Follow-up care is a key part of your treatment and safety. Be sure to make and go to all appointments, and call your doctor if you are having problems. It's also a good idea to know your test results and keep a list of the medicines you take. How can you care for yourself at home? · Get regular exercise. But don't overdo it. Go back and forth between rest and exercise. · Get plenty of rest.  · Eat a healthy diet. Do not skip meals, especially breakfast.  · Reduce your use of caffeine, tobacco, and alcohol. Caffeine is most often found in coffee, tea, cola drinks, and chocolate. · Limit medicines that can cause fatigue. This includes tranquilizers and cold and allergy medicines. When should you call for help? Watch closely for changes in your health, and be sure to contact your doctor if:    · You have new symptoms such as fever or a rash.     · Your fatigue gets worse.     · You have been feeling down, depressed, or hopeless. Or you may have lost interest in things that you usually enjoy.     · You are not getting better as expected. Where can you learn more? Go to [email or web address] org and sign in to your [employer] account.  Enter K045 in the [employer] box to learn more about \"Fatigue:

## 2018-08-27 NOTE — PROGRESS NOTES
2018     Chief Complaint   Patient presents with   [employer] Establish Care     Patient needs blood work       Deb Yadav (:  1960) is a 66 y.o. male, here for follow up of HTN, HLD, allergies, and constipation. HTN - current regimen includes lisinopril 20 mg daily, HCTZ 12.5 mg daily, and metoprolol 25 mg daily. Recently had negative stress test at [facility] [place]. Reports checking BP at home with systolic readings in the 386S. Denies any HA, chest pain, shortness of breath or LE swelling. HLD - currently on zocor 20 mg nightly. Also on ASA daily. He reports watching diet and getting back to his exercise routine following the recent removal of his spinal stimulator    Allergies  - flonase helps as well as occasional claritin use. Constipation - notes occasional hard stools which are frequently improved with colace. He reports improvement of bloating symptoms with probiotics. He denies any dark or blood stools. He is up to date on colon cancer screening - C-scope by Dr. Dahiana Calvo back in  w/ diverticulosis     ADHD/Depression - previously a patient of Dr. Joanie Miguel and had been on adderal but was recently weaned from this medication. Requesting possible adderal script. He was written a script for strattera but has not started yet. He is also taking zoloft. The 10-year ASCVD risk score (Yanique Live., et al., 2013) is: 5.4%    Values used to calculate the score:      Age: 66 years      Sex: Male      Is Non- : No      Diabetic: No      Tobacco smoker: No      Systolic Blood Pressure: 147 mmHg      Is BP treated: Yes      HDL Cholesterol: 43 mg/dL      Total Cholesterol: 136 mg/dL      Review of Systems   Constitutional: Negative for chills, fatigue and fever. HENT: Negative for congestion and rhinorrhea. Respiratory: Negative for cough and shortness of breath. Cardiovascular: Negative for chest pain, palpitations and leg swelling.    Gastrointestinal: Positive for constipation. Negative for abdominal pain, diarrhea, nausea and vomiting. Genitourinary: Negative for difficulty urinating. Musculoskeletal: Positive for arthralgias and back pain. Skin: Negative for rash. Neurological: Negative for dizziness, weakness and numbness. Hematological: Does not bruise/bleed easily. Prior to Visit Medications    Medication Sig Taking? Authorizing Provider   Probiotic Product (ADVANCED PROBIOTIC 10) CAPS TAKE (1) CAPSULE BY MOUTH ONCE EVERY DAY. Yes Anton Rodas MD   sildenafil (REVATIO) 20 MG tablet Take 2 or 3 tabs daily 1 hour (range: 30 minutes to 4 hours) before sexual activity as needed. Maximum 100 mg daily Yes Jesus Alberto Maria DO   ibuprofen (IBU) 800 MG tablet Take 1 tablet by mouth every 8 hours as needed for Pain Take with food. Yes Jesus Alberto Maria DO   aspirin 81 MG tablet Take 81 mg by mouth daily Yes Historical Provider, MD   cyclobenzaprine (FLEXERIL) 10 MG tablet Take 1 tablet by mouth 3 times daily as needed for Muscle spasms Yes SARA Swanson   metoprolol succinate (TOPROL XL) 25 MG extended release tablet Take 25 mg by mouth nightly Yes Historical Provider, MD   loratadine (CLARITIN) 10 MG tablet Take 1 tablet by mouth daily Yes Jesus Alberto Maria DO   ranitidine (ZANTAC) 150 MG tablet Take 1 tablet by mouth daily  Patient taking differently: Take 150 mg by mouth nightly  Yes Jesus Alberto Maria DO   sertraline (ZOLOFT) 100 MG tablet Take 1 tablet by mouth daily  Patient taking differently: Take 100 mg by mouth nightly  Yes Jesus Alberto Maria DO   fluticasone (FLONASE) 50 MCG/ACT nasal spray PLACE 1 OR 2 SPRAYS INTO EACH NOSTRIL ONCE DAILY AS DIRECTED Yes Angela Desai DO   mineral oil-hydrophilic petrolatum (AQUAPHOR) ointment Apply topically as needed.  Yes Jason Castle MD   simvastatin (ZOCOR) 20 MG tablet Take 1 tablet by mouth nightly Yes Jesus Alberto Maria DO   Cholecalciferol (VITAMIN D3) 2000 units CAPS Take 1 capsule by mouth daily  Patient present. Cardiovascular: Normal rate and regular rhythm. Pulmonary/Chest: Effort normal and breath sounds normal. No respiratory distress. He has no wheezes. He has no rales. Abdominal: Soft. He exhibits no distension. There is no tenderness. Musculoskeletal: He exhibits no edema or deformity. Previous surgical scars noted and healing well   Neurological: He is alert and oriented to person, place, and time. Skin: Skin is warm. No rash noted. Psychiatric: He has a normal mood and affect. His behavior is normal.       ASSESSMENT/PLAN:  1. Essential hypertension  - controlled today in office, continue current meds  - Comprehensive Metabolic Panel; Future    2. Mixed hyperlipidemia  - on zocor  - LIPID PANEL; Future  - Comprehensive Metabolic Panel; Future    3. Seasonal allergic rhinitis, unspecified trigger  - fluticasone (FLONASE) 50 MCG/ACT nasal spray; PLACE 1 OR 2 SPRAYS INTO EACH NOSTRIL ONCE DAILY AS DIRECTED  Dispense: 16 g; Refill: 0    4. Vitamin D deficiency  - Vitamin D 25 Hydrox, D2 & D3; Future    5. Medication refill  - fluticasone (FLONASE) 50 MCG/ACT nasal spray; PLACE 1 OR 2 SPRAYS INTO EACH NOSTRIL ONCE DAILY AS DIRECTED  Dispense: 16 g; Refill: 0  - Cholecalciferol (VITAMIN D3) 2000 units CAPS; Take 1 capsule by mouth daily  Dispense: 30 capsule; Refill: 5    6. Constipation, unspecified constipation type  - colace, fiber supplementation as needed    7. Fatigue, unspecified type  - CBC; Future  - TSH; Future      Additional plan and future considerations:   Labs as above. Continue current medications. Recommend continued follow up with Psychiatry regarding ADHD/Depression medication management.  RTO in 4-6 months for HTN follow up or sooner if needed    Future Appointments  Date Time Provider Sanjuanita Dueñas   2/28/2019 8:45 AM DO Afshin Field[address], DO on 8/27/2018 at 1:12 PM

## 2019-01-07 DIAGNOSIS — I10 ESSENTIAL HYPERTENSION: ICD-10-CM

## 2019-01-07 RX ORDER — LISINOPRIL 20 MG/1
20 TABLET ORAL DAILY
Qty: 30 TABLET | Refills: 5 | Status: SHIPPED | OUTPATIENT
Start: 2019-01-07 | End: 2019-03-27

## 2019-01-14 ENCOUNTER — OFFICE VISIT (OUTPATIENT)
Dept: FAMILY MEDICINE CLINIC | Age: 59
End: 2019-01-14
Payer: COMMERCIAL

## 2019-01-14 VITALS
DIASTOLIC BLOOD PRESSURE: 76 MMHG | WEIGHT: 244 LBS | BODY MASS INDEX: 34.16 KG/M2 | RESPIRATION RATE: 18 BRPM | SYSTOLIC BLOOD PRESSURE: 144 MMHG | TEMPERATURE: 98.4 F | HEART RATE: 94 BPM | HEIGHT: 71 IN | OXYGEN SATURATION: 99 %

## 2019-01-14 DIAGNOSIS — E78.2 MIXED HYPERLIPIDEMIA: Chronic | ICD-10-CM

## 2019-01-14 DIAGNOSIS — Z76.0 MEDICATION REFILL: ICD-10-CM

## 2019-01-14 DIAGNOSIS — R14.0 BLOATING: ICD-10-CM

## 2019-01-14 DIAGNOSIS — I10 ESSENTIAL HYPERTENSION: Primary | ICD-10-CM

## 2019-01-14 DIAGNOSIS — Z12.5 ENCOUNTER FOR SCREENING FOR MALIGNANT NEOPLASM OF PROSTATE: ICD-10-CM

## 2019-01-14 PROCEDURE — G8484 FLU IMMUNIZE NO ADMIN: HCPCS | Performed by: FAMILY MEDICINE

## 2019-01-14 PROCEDURE — G8427 DOCREV CUR MEDS BY ELIG CLIN: HCPCS | Performed by: FAMILY MEDICINE

## 2019-01-14 PROCEDURE — 99214 OFFICE O/P EST MOD 30 MIN: CPT | Performed by: FAMILY MEDICINE

## 2019-01-14 PROCEDURE — G8417 CALC BMI ABV UP PARAM F/U: HCPCS | Performed by: FAMILY MEDICINE

## 2019-01-14 PROCEDURE — 3017F COLORECTAL CA SCREEN DOC REV: CPT | Performed by: FAMILY MEDICINE

## 2019-01-14 PROCEDURE — 1036F TOBACCO NON-USER: CPT | Performed by: FAMILY MEDICINE

## 2019-01-14 RX ORDER — LORATADINE 10 MG/1
10 TABLET ORAL DAILY
Qty: 90 TABLET | Refills: 1 | Status: SHIPPED | OUTPATIENT
Start: 2019-01-14 | End: 2019-08-02 | Stop reason: SDUPTHER

## 2019-01-14 RX ORDER — IBUPROFEN 800 MG/1
800 TABLET ORAL EVERY 8 HOURS PRN
Qty: 30 TABLET | Refills: 3 | Status: SHIPPED | OUTPATIENT
Start: 2019-01-14 | End: 2019-08-02

## 2019-01-14 RX ORDER — KETOROLAC TROMETHAMINE 5 MG/ML
1 SOLUTION OPHTHALMIC 4 TIMES DAILY
Qty: 1 BOTTLE | Refills: 0 | Status: CANCELLED | OUTPATIENT
Start: 2019-01-14

## 2019-01-14 RX ORDER — HYDROCHLOROTHIAZIDE 12.5 MG/1
12.5 TABLET ORAL DAILY
Qty: 90 TABLET | Refills: 1 | Status: SHIPPED | OUTPATIENT
Start: 2019-01-14 | End: 2019-03-27

## 2019-01-14 RX ORDER — AMOXICILLIN AND CLAVULANATE POTASSIUM 875; 125 MG/1; MG/1
1 TABLET, FILM COATED ORAL 2 TIMES DAILY
COMMUNITY
End: 2019-03-27

## 2019-01-14 RX ORDER — DEXTROAMPHETAMINE SACCHARATE, AMPHETAMINE ASPARTATE, DEXTROAMPHETAMINE SULFATE AND AMPHETAMINE SULFATE 7.5; 7.5; 7.5; 7.5 MG/1; MG/1; MG/1; MG/1
30 TABLET ORAL PRN
COMMUNITY

## 2019-01-14 RX ORDER — METOPROLOL SUCCINATE 25 MG/1
25 TABLET, EXTENDED RELEASE ORAL NIGHTLY
Qty: 90 TABLET | Refills: 1 | Status: SHIPPED | OUTPATIENT
Start: 2019-01-14 | End: 2019-03-27 | Stop reason: SDUPTHER

## 2019-01-14 ASSESSMENT — ENCOUNTER SYMPTOMS
COUGH: 0
SHORTNESS OF BREATH: 0
SINUS PAIN: 0
NAUSEA: 0
DIARRHEA: 0
CONSTIPATION: 0
BACK PAIN: 0
VOMITING: 0
SORE THROAT: 0
RHINORRHEA: 0
ABDOMINAL PAIN: 0

## 2019-03-09 DIAGNOSIS — Z76.0 MEDICATION REFILL: ICD-10-CM

## 2019-03-11 RX ORDER — DOCUSATE SODIUM 100 MG/1
CAPSULE, LIQUID FILLED ORAL
Qty: 30 CAPSULE | Refills: 5 | Status: SHIPPED | OUTPATIENT
Start: 2019-03-11 | End: 2019-07-03

## 2019-03-11 RX ORDER — ACETAMINOPHEN 160 MG
TABLET,DISINTEGRATING ORAL
Qty: 30 CAPSULE | Refills: 5 | Status: SHIPPED | OUTPATIENT
Start: 2019-03-11 | End: 2019-08-02 | Stop reason: SDUPTHER

## 2019-03-11 RX ORDER — LACTOBACILL 46/B.ANIMAL/INULIN 10B-100 MG
CAPSULE ORAL
Qty: 30 CAPSULE | Refills: 5 | Status: SHIPPED | OUTPATIENT
Start: 2019-03-11 | End: 2019-08-02 | Stop reason: SDUPTHER

## 2019-03-27 ENCOUNTER — OFFICE VISIT (OUTPATIENT)
Dept: FAMILY MEDICINE CLINIC | Age: 59
End: 2019-03-27
Payer: COMMERCIAL

## 2019-03-27 VITALS
BODY MASS INDEX: 32.9 KG/M2 | RESPIRATION RATE: 16 BRPM | SYSTOLIC BLOOD PRESSURE: 138 MMHG | HEART RATE: 73 BPM | WEIGHT: 235 LBS | TEMPERATURE: 98.4 F | HEIGHT: 71 IN | DIASTOLIC BLOOD PRESSURE: 94 MMHG | OXYGEN SATURATION: 98 %

## 2019-03-27 DIAGNOSIS — M54.16 CHRONIC RADICULAR LOW BACK PAIN: ICD-10-CM

## 2019-03-27 DIAGNOSIS — K21.9 GASTROESOPHAGEAL REFLUX DISEASE WITHOUT ESOPHAGITIS: ICD-10-CM

## 2019-03-27 DIAGNOSIS — M51.16 LUMBAR DISC DISEASE WITH RADICULOPATHY: ICD-10-CM

## 2019-03-27 DIAGNOSIS — E78.2 MIXED HYPERLIPIDEMIA: ICD-10-CM

## 2019-03-27 DIAGNOSIS — G89.29 CHRONIC RADICULAR LOW BACK PAIN: ICD-10-CM

## 2019-03-27 DIAGNOSIS — J30.2 SEASONAL ALLERGIC RHINITIS, UNSPECIFIED TRIGGER: ICD-10-CM

## 2019-03-27 DIAGNOSIS — E55.9 VITAMIN D DEFICIENCY: ICD-10-CM

## 2019-03-27 DIAGNOSIS — Z96.82 S/P INSERTION OF BRAIN-RESPONSIVE NEUROSTIMULATION DEVICE: ICD-10-CM

## 2019-03-27 DIAGNOSIS — L50.9 URTICARIA: ICD-10-CM

## 2019-03-27 DIAGNOSIS — I10 ESSENTIAL HYPERTENSION: Primary | ICD-10-CM

## 2019-03-27 PROCEDURE — G8417 CALC BMI ABV UP PARAM F/U: HCPCS | Performed by: FAMILY MEDICINE

## 2019-03-27 PROCEDURE — 1036F TOBACCO NON-USER: CPT | Performed by: FAMILY MEDICINE

## 2019-03-27 PROCEDURE — G8484 FLU IMMUNIZE NO ADMIN: HCPCS | Performed by: FAMILY MEDICINE

## 2019-03-27 PROCEDURE — 99215 OFFICE O/P EST HI 40 MIN: CPT | Performed by: FAMILY MEDICINE

## 2019-03-27 PROCEDURE — G8427 DOCREV CUR MEDS BY ELIG CLIN: HCPCS | Performed by: FAMILY MEDICINE

## 2019-03-27 PROCEDURE — 3017F COLORECTAL CA SCREEN DOC REV: CPT | Performed by: FAMILY MEDICINE

## 2019-03-27 RX ORDER — PANTOPRAZOLE SODIUM 40 MG/1
40 TABLET, DELAYED RELEASE ORAL DAILY
COMMUNITY
End: 2019-12-18

## 2019-03-27 RX ORDER — LOSARTAN POTASSIUM 100 MG/1
TABLET ORAL
COMMUNITY
End: 2019-05-29 | Stop reason: SDUPTHER

## 2019-03-27 ASSESSMENT — ENCOUNTER SYMPTOMS
SHORTNESS OF BREATH: 0
CHEST TIGHTNESS: 0
CONSTIPATION: 0
SORE THROAT: 0
BACK PAIN: 0
DIARRHEA: 0
RHINORRHEA: 0
ABDOMINAL PAIN: 0
COUGH: 0
NAUSEA: 0
EYE PAIN: 0
EYE ITCHING: 0

## 2019-03-28 RX ORDER — METOPROLOL SUCCINATE 50 MG/1
50 TABLET, EXTENDED RELEASE ORAL NIGHTLY
Qty: 30 TABLET | Refills: 2 | Status: SHIPPED | OUTPATIENT
Start: 2019-03-28 | End: 2019-04-30 | Stop reason: SDUPTHER

## 2019-03-28 ASSESSMENT — ENCOUNTER SYMPTOMS: ROS SKIN COMMENTS: URTICARIA

## 2019-04-18 ENCOUNTER — HOSPITAL ENCOUNTER (OUTPATIENT)
Age: 59
Discharge: HOME | End: 2019-04-18
Payer: COMMERCIAL

## 2019-04-18 DIAGNOSIS — I10 ESSENTIAL HYPERTENSION: ICD-10-CM

## 2019-04-18 DIAGNOSIS — E78.2 MIXED HYPERLIPIDEMIA: ICD-10-CM

## 2019-04-18 DIAGNOSIS — E55.9 VITAMIN D DEFICIENCY: ICD-10-CM

## 2019-04-18 LAB
ALBUMIN SERPL-MCNC: 3.9 G/DL (ref 3.5–5.2)
ALP BLD-CCNC: 76 U/L (ref 40–129)
ALT SERPL-CCNC: 31 U/L (ref 0–40)
ANION GAP SERPL CALCULATED.3IONS-SCNC: 11 MMOL/L (ref 7–16)
AST SERPL-CCNC: 30 U/L (ref 0–39)
BASOPHILS ABSOLUTE: 0.06 E9/L (ref 0–0.2)
BASOPHILS RELATIVE PERCENT: 0.8 % (ref 0–2)
BILIRUB SERPL-MCNC: 0.5 MG/DL (ref 0–1.2)
BUN BLDV-MCNC: 12 MG/DL (ref 6–20)
CALCIUM SERPL-MCNC: 8.9 MG/DL (ref 8.6–10.2)
CHLORIDE BLD-SCNC: 106 MMOL/L (ref 98–107)
CHOLESTEROL, TOTAL: 210 MG/DL (ref 0–199)
CO2: 26 MMOL/L (ref 22–29)
CREAT SERPL-MCNC: 1 MG/DL (ref 0.7–1.2)
EOSINOPHILS ABSOLUTE: 0.47 E9/L (ref 0.05–0.5)
EOSINOPHILS RELATIVE PERCENT: 6.2 % (ref 0–6)
GFR AFRICAN AMERICAN: >60
GFR NON-AFRICAN AMERICAN: >60 ML/MIN/1.73
GLUCOSE BLD-MCNC: 96 MG/DL (ref 74–99)
HCT VFR BLD CALC: 44 % (ref 37–54)
HDLC SERPL-MCNC: 37 MG/DL
HEMOGLOBIN: 15.4 G/DL (ref 12.5–16.5)
IMMATURE GRANULOCYTES #: 0.06 E9/L
IMMATURE GRANULOCYTES %: 0.8 % (ref 0–5)
LDL CHOLESTEROL CALCULATED: 132 MG/DL (ref 0–99)
LYMPHOCYTES ABSOLUTE: 1.38 E9/L (ref 1.5–4)
LYMPHOCYTES RELATIVE PERCENT: 18.2 % (ref 20–42)
MCH RBC QN AUTO: 31.5 PG (ref 26–35)
MCHC RBC AUTO-ENTMCNC: 35 % (ref 32–34.5)
MCV RBC AUTO: 90 FL (ref 80–99.9)
MONOCYTES ABSOLUTE: 0.71 E9/L (ref 0.1–0.95)
MONOCYTES RELATIVE PERCENT: 9.4 % (ref 2–12)
NEUTROPHILS ABSOLUTE: 4.9 E9/L (ref 1.8–7.3)
NEUTROPHILS RELATIVE PERCENT: 64.6 % (ref 43–80)
PDW BLD-RTO: 12.4 FL (ref 11.5–15)
PLATELET # BLD: 258 E9/L (ref 130–450)
PMV BLD AUTO: 8.9 FL (ref 7–12)
POTASSIUM SERPL-SCNC: 4 MMOL/L (ref 3.5–5)
RBC # BLD: 4.89 E12/L (ref 3.8–5.8)
SODIUM BLD-SCNC: 143 MMOL/L (ref 132–146)
TOTAL PROTEIN: 7 G/DL (ref 6.4–8.3)
TRIGL SERPL-MCNC: 207 MG/DL (ref 0–149)
TSH SERPL DL<=0.05 MIU/L-ACNC: 2.64 UIU/ML (ref 0.27–4.2)
VITAMIN D 25-HYDROXY: 46 NG/ML (ref 30–100)
VLDLC SERPL CALC-MCNC: 41 MG/DL
WBC # BLD: 7.6 E9/L (ref 4.5–11.5)

## 2019-04-18 PROCEDURE — 85025 COMPLETE CBC W/AUTO DIFF WBC: CPT

## 2019-04-18 PROCEDURE — 36415 COLL VENOUS BLD VENIPUNCTURE: CPT

## 2019-04-18 PROCEDURE — 80053 COMPREHEN METABOLIC PANEL: CPT

## 2019-04-18 PROCEDURE — 82306 VITAMIN D 25 HYDROXY: CPT

## 2019-04-18 PROCEDURE — 80061 LIPID PANEL: CPT

## 2019-04-18 PROCEDURE — 84443 ASSAY THYROID STIM HORMONE: CPT

## 2019-04-25 ENCOUNTER — HOSPITAL ENCOUNTER (OUTPATIENT)
Dept: CT IMAGING | Age: 59
Discharge: HOME | End: 2019-04-27
Payer: COMMERCIAL

## 2019-04-25 DIAGNOSIS — R91.1 PULMONARY NODULE: ICD-10-CM

## 2019-04-25 PROCEDURE — 71250 CT THORAX DX C-: CPT

## 2019-04-26 ENCOUNTER — OFFICE VISIT (OUTPATIENT)
Dept: FAMILY MEDICINE CLINIC | Age: 59
End: 2019-04-26
Payer: COMMERCIAL

## 2019-04-26 VITALS
TEMPERATURE: 99.2 F | HEART RATE: 81 BPM | DIASTOLIC BLOOD PRESSURE: 82 MMHG | HEIGHT: 71 IN | OXYGEN SATURATION: 94 % | WEIGHT: 234 LBS | BODY MASS INDEX: 32.76 KG/M2 | RESPIRATION RATE: 16 BRPM | SYSTOLIC BLOOD PRESSURE: 134 MMHG

## 2019-04-26 DIAGNOSIS — I10 ESSENTIAL HYPERTENSION: Primary | ICD-10-CM

## 2019-04-26 DIAGNOSIS — L50.9 URTICARIA: ICD-10-CM

## 2019-04-26 DIAGNOSIS — Z91.030 BEE STING ALLERGY: ICD-10-CM

## 2019-04-26 DIAGNOSIS — E78.2 MIXED HYPERLIPIDEMIA: ICD-10-CM

## 2019-04-26 PROCEDURE — 3017F COLORECTAL CA SCREEN DOC REV: CPT | Performed by: FAMILY MEDICINE

## 2019-04-26 PROCEDURE — G8427 DOCREV CUR MEDS BY ELIG CLIN: HCPCS | Performed by: FAMILY MEDICINE

## 2019-04-26 PROCEDURE — 99214 OFFICE O/P EST MOD 30 MIN: CPT | Performed by: FAMILY MEDICINE

## 2019-04-26 PROCEDURE — G8417 CALC BMI ABV UP PARAM F/U: HCPCS | Performed by: FAMILY MEDICINE

## 2019-04-26 PROCEDURE — 1036F TOBACCO NON-USER: CPT | Performed by: FAMILY MEDICINE

## 2019-04-26 RX ORDER — ATORVASTATIN CALCIUM 10 MG/1
10 TABLET, FILM COATED ORAL DAILY
Qty: 30 TABLET | Refills: 3 | Status: SHIPPED | OUTPATIENT
Start: 2019-04-26 | End: 2019-08-01 | Stop reason: SDUPTHER

## 2019-04-26 RX ORDER — EPINEPHRINE 0.3 MG/.3ML
0.3 INJECTION SUBCUTANEOUS
Qty: 1 EACH | Refills: 1 | Status: SHIPPED | OUTPATIENT
Start: 2019-04-26 | End: 2019-07-03

## 2019-04-26 RX ORDER — DESONIDE 0.5 MG/G
CREAM TOPICAL
COMMUNITY
Start: 2019-04-15

## 2019-04-26 ASSESSMENT — ENCOUNTER SYMPTOMS
CHEST TIGHTNESS: 0
ABDOMINAL PAIN: 0
EYE ITCHING: 0
NAUSEA: 0
ROS SKIN COMMENTS: URTICARIA
CONSTIPATION: 0
SHORTNESS OF BREATH: 0
EYE PAIN: 0
BACK PAIN: 0
DIARRHEA: 0
RHINORRHEA: 0
COUGH: 0
SORE THROAT: 0

## 2019-04-26 NOTE — PROGRESS NOTES
Sera Braxton  : 1960    Chief Complaint:     Chief Complaint   Patient presents with    Hypertension       HPI  Sera Braxton 66 y.o. presents for   Chief Complaint   Patient presents with    Hypertension     HTN/HLD/urticaria   BP is well controlled today. He has been taking his medication as prescribed. He did stop simvastatin as they thought this may be causing his allergic reaction. However he did go see dermatology who thought this was likely eczema. He states this has been clearing up recently. He has not had anymore urticaria. His cholesterol did come back elevated. All questions were answered to patients satisfaction. Past Medical History:   Diagnosis Date    Acute drug-induced gout of left foot 2015    Had gout attack on 7/18/15. Seems to be HCTZ induced.       ADHD (attention deficit hyperactivity disorder)     Back pain, chronic     Depression     Hyperlipidemia     Hypertension     Left elbow fracture 10/27/2015    MRSA (methicillin resistant staph aureus) culture positive     Seasonal allergies        Past Surgical History:   Procedure Laterality Date    APPENDECTOMY      COLONOSCOPY  2013    sigmoid diverticulosis, Dr. Dev Mcgrath, [address] [place] [address] COLONOSCOPY  2013    FOOT SURGERY      RIGHT DUE TO FX    LUMBAR DISCECTOMY      [place]     OTHER SURGICAL HISTORY  2018    removal and replacement of spinal cord stimulator    [place] IMPLANT NEUROSTIM/ N/A 3/28/2018    REMOVAL OF T7 - T8 MALFUCTIONING  STIMULATOR, REPLACEMENT OF T7- T8 SPINAL CORD STIMULATOR --[facility]ILL[facility] MURILLO, [employer] performed by Sarbjit Guzman MD at [place] [address]      insertion of spinal cord stimulator and since then has had 8 different surgeries to take it out and put it in due to MRSA infections    SPINE SURGERY  10/11/2011    INSERTION OF SPINAL CORD STIMULATOR    SPINE SURGERY  2012    change of spinal cord stimulator, Dr. Velvet Constantino, (EPIPEN 2-EVANGELISTA) 0.3 MG/0.3ML SOAJ injection Inject 0.3 mLs into the muscle once as needed (anaphylaxis) Use as directed for allergic reaction 1 each 1    atorvastatin (LIPITOR) 10 MG tablet Take 1 tablet by mouth daily 30 tablet 3    metoprolol succinate (TOPROL XL) 50 MG extended release tablet Take 1 tablet by mouth nightly 30 tablet 2    losartan (COZAAR) 100 MG tablet Take by mouth      NITROGLYCERIN SL Place under the tongue      Psyllium (METAMUCIL FIBER PO) Take by mouth      pantoprazole (PROTONIX) 40 MG tablet Take 40 mg by mouth daily      Cholecalciferol (VITAMIN D3) 2000 units CAPS TAKE (1) CAPSULE BY MOUTH ONCE EVERY DAY. 30 capsule 5     MG capsule TAKE ONE CAPSULE BY MOUTH ONCE DAILY AS NEEDED FOR CONSTIPATION 30 capsule 5    Probiotic Product (ADVANCED PROBIOTIC 10) CAPS TAKE (1) CAPSULE BY MOUTH ONCE EVERY DAY. 30 capsule 5    amphetamine-dextroamphetamine (ADDERALL) 30 MG tablet Take 30 mg by mouth daily. Albertus Reenancy Lisdexamfetamine Dimesylate (VYVANSE) 40 MG CAPS Take 1 capsule by mouth daily. Karlo Reel aspirin 81 MG tablet Take 1 tablet by mouth daily 90 tablet 1    loratadine (CLARITIN) 10 MG tablet Take 1 tablet by mouth daily 90 tablet 1    ibuprofen (IBU) 800 MG tablet Take 1 tablet by mouth every 8 hours as needed for Pain Take with food. 30 tablet 3    fluticasone (FLONASE) 50 MCG/ACT nasal spray PLACE 1 OR 2 SPRAYS INTO EACH NOSTRIL ONCE DAILY AS DIRECTED 16 g 0    ranitidine (ZANTAC) 150 MG tablet Take 1 tablet by mouth nightly 30 tablet 0    sildenafil (REVATIO) 20 MG tablet Take 2 or 3 tabs daily 1 hour (range: 30 minutes to 4 hours) before sexual activity as needed. Maximum 100 mg daily 60 tablet 0    mineral oil-hydrophilic petrolatum (AQUAPHOR) ointment Apply topically as needed.  396 g 3    ketorolac (ACULAR) 0.5 % ophthalmic solution Place 1 drop into both eyes 4 times daily 1 Bottle 0    hydrocortisone 2.5 % ointment APPLY TOPICALLY TO AFFTECTED ARE TWICE DAILY 28.35 g 3     No current facility-administered medications for this visit. Allergies   Allergen Reactions    Bee Venom     Dye [Iodides] Other (See Comments)     Patient reports father had an anaphylactic reaction to iodide which result in death    Iodine Solution [Povidone Iodine] Rash     TOPICAL AND INJECTED    Sulfa Antibiotics Rash    Topical Sulfur Rash       Health Maintenance Due   Topic Date Due    Shingles Vaccine (1 of 2) 07/18/2012           REVIEW OF SYSTEMS  Review of Systems   Constitutional: Negative for chills, fatigue and fever. HENT: Negative for congestion, ear pain, postnasal drip, rhinorrhea and sore throat. Eyes: Negative for pain and itching. Respiratory: Negative for cough, chest tightness and shortness of breath. Cardiovascular: Negative for chest pain and leg swelling. Gastrointestinal: Negative for abdominal pain, constipation, diarrhea and nausea. Endocrine: Negative for heat intolerance. Genitourinary: Negative for flank pain, frequency and urgency. Musculoskeletal: Negative for arthralgias and back pain. Skin: Positive for rash. Negative for pallor. urticaria   Allergic/Immunologic: Negative for environmental allergies. Neurological: Negative for dizziness, numbness and headaches. Hematological: Does not bruise/bleed easily. Psychiatric/Behavioral: Negative for agitation, behavioral problems and hallucinations. PHYSICAL EXAM  /82 (Site: Left Upper Arm, Position: Sitting, Cuff Size: Large Adult)   Pulse 81   Temp 99.2 °F (37.3 °C) (Oral)   Resp 16   Ht 5' 11\" (1.803 m)   Wt 234 lb (106.1 kg)   SpO2 94%   BMI 32.64 kg/m²   Physical Exam   Constitutional: He is oriented to person, place, and time. He appears well-developed and well-nourished. No distress. HENT:   Head: Normocephalic and atraumatic.    Right Ear: External ear normal.   Left Ear: External ear normal.   Nose: Nose normal.   Mouth/Throat: Oropharynx is clear and moist. No oropharyngeal exudate. Eyes: Conjunctivae and EOM are normal. Right eye exhibits no discharge. Left eye exhibits no discharge. Neck: Normal range of motion. Neck supple. Cardiovascular: Normal rate, regular rhythm and normal heart sounds. Exam reveals no gallop and no friction rub. No murmur heard. Pulmonary/Chest: Effort normal and breath sounds normal. No respiratory distress. He has no wheezes. Abdominal: Soft. There is no tenderness. Musculoskeletal: Normal range of motion. He exhibits no edema or tenderness. Neurological: He is alert and oriented to person, place, and time. He has normal reflexes. Coordination normal.   Skin: Skin is warm and dry. No rash (erythema noted bilateral face and neck, arms - no urticaria noted today - patient states this is improved recently.) noted. He is not diaphoretic. No erythema. Psychiatric: He has a normal mood and affect. His behavior is normal. Judgment and thought content normal.   Nursing note and vitals reviewed. Laboratory:   All laboratory and radiology results have been personally reviewed by myself    Lab Results   Component Value Date     04/18/2019    K 4.0 04/18/2019    K 4.2 03/26/2018     04/18/2019    CO2 26 04/18/2019    BUN 12 04/18/2019    CREATININE 1.0 04/18/2019    PROT 7.0 04/18/2019    LABALBU 3.9 04/18/2019    LABALBU 4.4 08/01/2011    CALCIUM 8.9 04/18/2019    GFRAA >60 04/18/2019    LABGLOM >60 04/18/2019    GLUCOSE 96 04/18/2019    GLUCOSE 59 08/01/2011    AST 30 04/18/2019    ALT 31 04/18/2019    ALKPHOS 76 04/18/2019    BILITOT 0.5 04/18/2019    TSH 2.640 04/18/2019    VITD25 46 04/18/2019    CHOL 210 04/18/2019    TRIG 207 04/18/2019    HDL 37 04/18/2019    LDLCALC 132 04/18/2019    LABA1C 5.3 10/17/2017        Lab Results   Component Value Date    CHOL 210 (H) 04/18/2019    CHOL 152 08/27/2018    CHOL 136 09/11/2017     Lab Results   Component Value Date    TRIG 207 (H) 04/18/2019    TRIG 156 (H) 08/27/2018    TRIG 203 (H) 09/11/2017     Lab Results   Component Value Date    HDL 37 04/18/2019    HDL 42 08/27/2018    HDL 43 09/11/2017     Lab Results   Component Value Date    LDLCALC 132 (H) 04/18/2019    LDLCALC 79 08/27/2018    LDLCALC 52 09/11/2017       Lab Results   Component Value Date    LABA1C 5.3 10/17/2017    LABA1C 5.0 02/08/2011     Lab Results   Component Value Date    LDLCALC 132 (H) 04/18/2019    CREATININE 1.0 04/18/2019       ASSESSMENT/PLAN:     Diagnosis Orders   1. Essential hypertension  BP well controlled today, continue current therapy      2. Mixed hyperlipidemia  Start low dose lipitor to see if he tolerates this  atorvastatin (LIPITOR) 10 MG tablet     3. Urticaria  Continue to follow with dermatology      4. Bee sting allergy  EPINEPHrine (EPIPEN 2-EVANGELISTA) 0.3 MG/0.3ML SOAJ injection       Problem list reviewed andsimplified/updated  HM reviewed today and counseled as appropriate    Call or go to ED immediately if symptoms worsen or persist.  Future Appointments   Date Time Provider Sanjuanita Dueñas   8/2/2019 10:45 AM DO Fito Price [facility]     Or sooner if necessary. Educational materials and/or homeexercises printed for patient's review and were included in patient instructions on his/her After Visit Summary and given to patient at the end of visit.       Counseled regarding above diagnosis, including possible risks and complications,  especially if left uncontrolled.     Counseled regarding the possible side effects, risks, benefits and alternatives to treatment; patient and/or guardian verbalizes understanding, agrees,feels comfortable with and wishes to proceed with above treatment plan.     Advised patient to call Tremayne Roper new medication issues, and read all Rx info from pharmacy to assure aware of all possible risks and side effects of medication before taking.     Reviewed age and gender appropriate health screening exams and vaccinations.   Advised patient regarding importance of keeping up with recommended health maintenance and toschedule as soon as possible if overdue, as this is important in assessing for undiagnosed pathology, especially cancer, as well as protecting against potentially harmful/life threatening disease.          Patient and/or guardian verbalizes understanding and agrees with above counseling, assessment and plan.     All questions answered. Mohit 5, DO  4/26/19    NOTE: This report was transcribed using voice recognition software.  Every effort was made to ensure accuracy; however, inadvertent computerized transcription errors may be present

## 2019-04-30 DIAGNOSIS — Z76.0 MEDICATION REFILL: ICD-10-CM

## 2019-04-30 DIAGNOSIS — K21.9 GASTROESOPHAGEAL REFLUX DISEASE WITHOUT ESOPHAGITIS: ICD-10-CM

## 2019-04-30 DIAGNOSIS — I10 ESSENTIAL HYPERTENSION: ICD-10-CM

## 2019-04-30 RX ORDER — METOPROLOL SUCCINATE 50 MG/1
50 TABLET, EXTENDED RELEASE ORAL NIGHTLY
Qty: 30 TABLET | Refills: 2 | Status: SHIPPED | OUTPATIENT
Start: 2019-04-30 | End: 2019-08-01 | Stop reason: SDUPTHER

## 2019-04-30 RX ORDER — RANITIDINE 150 MG/1
150 TABLET ORAL NIGHTLY
Qty: 30 TABLET | Refills: 0 | Status: SHIPPED | OUTPATIENT
Start: 2019-04-30 | End: 2019-05-28 | Stop reason: SDUPTHER

## 2019-05-28 DIAGNOSIS — K21.9 GASTROESOPHAGEAL REFLUX DISEASE WITHOUT ESOPHAGITIS: ICD-10-CM

## 2019-05-28 DIAGNOSIS — Z76.0 MEDICATION REFILL: ICD-10-CM

## 2019-05-28 RX ORDER — RANITIDINE 150 MG/1
150 TABLET ORAL NIGHTLY
Qty: 30 TABLET | Refills: 0 | Status: SHIPPED | OUTPATIENT
Start: 2019-05-28 | End: 2019-06-26 | Stop reason: SDUPTHER

## 2019-05-29 ENCOUNTER — TELEPHONE (OUTPATIENT)
Dept: FAMILY MEDICINE CLINIC | Age: 59
End: 2019-05-29

## 2019-05-29 RX ORDER — LOSARTAN POTASSIUM 100 MG/1
100 TABLET ORAL DAILY
Qty: 30 TABLET | Refills: 1 | Status: SHIPPED | OUTPATIENT
Start: 2019-05-29 | End: 2019-07-03

## 2019-05-29 NOTE — TELEPHONE ENCOUNTER
Pt calling to see if needs to be on lisinopril 20 mg and losartan 100 mg as he was previously on these.

## 2019-06-26 DIAGNOSIS — Z76.0 MEDICATION REFILL: ICD-10-CM

## 2019-06-26 DIAGNOSIS — K21.9 GASTROESOPHAGEAL REFLUX DISEASE WITHOUT ESOPHAGITIS: ICD-10-CM

## 2019-06-26 RX ORDER — RANITIDINE 150 MG/1
150 TABLET ORAL NIGHTLY
Qty: 30 TABLET | Refills: 0 | Status: SHIPPED | OUTPATIENT
Start: 2019-06-26 | End: 2019-08-02 | Stop reason: SDUPTHER

## 2019-07-03 PROBLEM — F90.0 ATTENTION DEFICIT HYPERACTIVITY DISORDER, PREDOMINANTLY INATTENTIVE TYPE: Status: ACTIVE | Noted: 2019-01-28

## 2019-08-01 DIAGNOSIS — E78.2 MIXED HYPERLIPIDEMIA: ICD-10-CM

## 2019-08-01 DIAGNOSIS — I10 ESSENTIAL HYPERTENSION: ICD-10-CM

## 2019-08-01 RX ORDER — METOPROLOL SUCCINATE 50 MG/1
50 TABLET, EXTENDED RELEASE ORAL NIGHTLY
Qty: 30 TABLET | Refills: 0 | Status: SHIPPED | OUTPATIENT
Start: 2019-08-01 | End: 2019-08-02 | Stop reason: SDUPTHER

## 2019-08-01 RX ORDER — LOSARTAN POTASSIUM 100 MG/1
TABLET ORAL
Qty: 30 TABLET | Refills: 0 | Status: SHIPPED | OUTPATIENT
Start: 2019-08-01 | End: 2019-08-02 | Stop reason: SDUPTHER

## 2019-08-01 RX ORDER — ATORVASTATIN CALCIUM 10 MG/1
TABLET, FILM COATED ORAL
Qty: 30 TABLET | Refills: 0 | Status: SHIPPED | OUTPATIENT
Start: 2019-08-01 | End: 2019-08-02 | Stop reason: SDUPTHER

## 2019-08-02 ENCOUNTER — OFFICE VISIT (OUTPATIENT)
Dept: FAMILY MEDICINE CLINIC | Age: 59
End: 2019-08-02
Payer: COMMERCIAL

## 2019-08-02 VITALS
RESPIRATION RATE: 14 BRPM | DIASTOLIC BLOOD PRESSURE: 81 MMHG | BODY MASS INDEX: 33.18 KG/M2 | SYSTOLIC BLOOD PRESSURE: 134 MMHG | WEIGHT: 237 LBS | HEART RATE: 89 BPM | HEIGHT: 71 IN

## 2019-08-02 DIAGNOSIS — Z76.0 MEDICATION REFILL: ICD-10-CM

## 2019-08-02 DIAGNOSIS — K21.9 GASTROESOPHAGEAL REFLUX DISEASE WITHOUT ESOPHAGITIS: ICD-10-CM

## 2019-08-02 DIAGNOSIS — E78.2 MIXED HYPERLIPIDEMIA: ICD-10-CM

## 2019-08-02 DIAGNOSIS — I10 ESSENTIAL HYPERTENSION: Primary | ICD-10-CM

## 2019-08-02 DIAGNOSIS — J30.2 SEASONAL ALLERGIC RHINITIS, UNSPECIFIED TRIGGER: ICD-10-CM

## 2019-08-02 DIAGNOSIS — B35.3 ATHLETE'S FOOT ON LEFT: ICD-10-CM

## 2019-08-02 DIAGNOSIS — L25.5 RHUS DERMATITIS: ICD-10-CM

## 2019-08-02 PROCEDURE — G8417 CALC BMI ABV UP PARAM F/U: HCPCS | Performed by: FAMILY MEDICINE

## 2019-08-02 PROCEDURE — 3017F COLORECTAL CA SCREEN DOC REV: CPT | Performed by: FAMILY MEDICINE

## 2019-08-02 PROCEDURE — G8427 DOCREV CUR MEDS BY ELIG CLIN: HCPCS | Performed by: FAMILY MEDICINE

## 2019-08-02 PROCEDURE — 99214 OFFICE O/P EST MOD 30 MIN: CPT | Performed by: FAMILY MEDICINE

## 2019-08-02 PROCEDURE — 1036F TOBACCO NON-USER: CPT | Performed by: FAMILY MEDICINE

## 2019-08-02 RX ORDER — FLUTICASONE PROPIONATE 50 MCG
SPRAY, SUSPENSION (ML) NASAL
Qty: 16 G | Refills: 2 | Status: SHIPPED | OUTPATIENT
Start: 2019-08-02 | End: 2019-12-18 | Stop reason: SDUPTHER

## 2019-08-02 RX ORDER — CLOTRIMAZOLE AND BETAMETHASONE DIPROPIONATE 10; .64 MG/G; MG/G
CREAM TOPICAL
Qty: 1 TUBE | Refills: 0 | Status: SHIPPED
Start: 2019-08-02 | End: 2021-01-05

## 2019-08-02 RX ORDER — METOPROLOL SUCCINATE 50 MG/1
50 TABLET, EXTENDED RELEASE ORAL NIGHTLY
Qty: 90 TABLET | Refills: 1 | Status: SHIPPED | OUTPATIENT
Start: 2019-08-02 | End: 2019-12-18 | Stop reason: SDUPTHER

## 2019-08-02 RX ORDER — RANITIDINE 150 MG/1
150 TABLET ORAL NIGHTLY
Qty: 90 TABLET | Refills: 1 | Status: SHIPPED | OUTPATIENT
Start: 2019-08-02 | End: 2019-12-18

## 2019-08-02 RX ORDER — KETOROLAC TROMETHAMINE 5 MG/ML
1 SOLUTION OPHTHALMIC 4 TIMES DAILY
Qty: 1 BOTTLE | Refills: 0 | Status: SHIPPED
Start: 2019-08-02 | End: 2020-10-15 | Stop reason: SDUPTHER

## 2019-08-02 RX ORDER — LACTOBACILL 46/B.ANIMAL/INULIN 10B-100 MG
1 CAPSULE ORAL DAILY
Qty: 90 CAPSULE | Refills: 1 | Status: SHIPPED
Start: 2019-08-02 | End: 2020-10-15 | Stop reason: SDUPTHER

## 2019-08-02 RX ORDER — ATORVASTATIN CALCIUM 10 MG/1
TABLET, FILM COATED ORAL
Qty: 90 TABLET | Refills: 1 | Status: SHIPPED | OUTPATIENT
Start: 2019-08-02 | End: 2019-12-18 | Stop reason: SDUPTHER

## 2019-08-02 RX ORDER — ACETAMINOPHEN 160 MG
TABLET,DISINTEGRATING ORAL
Qty: 90 CAPSULE | Refills: 1 | Status: SHIPPED | OUTPATIENT
Start: 2019-08-02 | End: 2019-08-27 | Stop reason: SDUPTHER

## 2019-08-02 RX ORDER — LORATADINE 10 MG/1
10 TABLET ORAL DAILY
Qty: 90 TABLET | Refills: 1 | Status: SHIPPED | OUTPATIENT
Start: 2019-08-02 | End: 2019-12-18 | Stop reason: SDUPTHER

## 2019-08-02 RX ORDER — TRIAMCINOLONE ACETONIDE 1 MG/G
CREAM TOPICAL
Qty: 45 G | Refills: 0 | Status: SHIPPED | OUTPATIENT
Start: 2019-08-02

## 2019-08-02 RX ORDER — LOSARTAN POTASSIUM 100 MG/1
TABLET ORAL
Qty: 90 TABLET | Refills: 1 | Status: SHIPPED | OUTPATIENT
Start: 2019-08-02 | End: 2019-12-18 | Stop reason: SDUPTHER

## 2019-08-02 NOTE — PROGRESS NOTES
since then has had 8 different surgeries to take it out and put it in due to MRSA infections    SPINE SURGERY  10/11/2011    INSERTION OF SPINAL CORD STIMULATOR    SPINE SURGERY  6/2012    change of spinal cord stimulator, [address] History     Socioeconomic History    Marital status:      Spouse name: None    Number of children: None    Years of education: None    Highest education level: None   Occupational History    Occupation: retired    Social Needs    Financial resource strain: None    Food insecurity:     Worry: None     Inability: None    Transportation needs:     Medical: None     Non-medical: None   Tobacco Use    Smoking status: Never Smoker    Smokeless tobacco: Never Used   Substance and Sexual Activity    Alcohol use: Yes     Comment: rare    Drug use: Yes     Comment: used to do cocaine 2013    Sexual activity: Not Currently   Lifestyle    Physical activity:     Days per week: None     Minutes per session: None    Stress: None   Relationships    Social connections:     Talks on phone: None     Gets together: None     Attends Restorationism service: None     Active member of club or organization: None     Attends meetings of clubs or organizations: None     Relationship status: None    Intimate partner violence:     Fear of current or ex partner: None     Emotionally abused: None     Physically abused: None     Forced sexual activity: None   Other Topics Concern    None   Social History Narrative    None       Family History   Problem Relation Age of Onset    Heart Failure Mother         CABG at 61     Heart Disease Mother     High Blood Pressure Mother     Asthma Mother     Heart Failure Father         CABG at 63's    Hypertension Father     Diabetes Father     Heart Disease Father     High Blood Pressure Father           Current Outpatient Medications   Medication Sig Dispense Refill    Cholecalciferol (VITAMIN D3) 2000 Maximum 100 mg daily 60 tablet 0    mineral oil-hydrophilic petrolatum (AQUAPHOR) ointment Apply topically as needed. 396 g 3     No current facility-administered medications for this visit. Allergies   Allergen Reactions    Bee Venom     Dye [Iodides] Other (See Comments)     Patient reports father had an anaphylactic reaction to iodide which result in death    Iodine Solution [Povidone Iodine] Rash     TOPICAL AND INJECTED    Sulfa Antibiotics Rash    Topical Sulfur Rash       Health Maintenance Due   Topic Date Due    Shingles Vaccine (1 of 2) 07/18/2012           REVIEW OF SYSTEMS  Review of Systems   Constitutional: Negative for chills, fatigue and fever. HENT: Negative for congestion, ear pain, postnasal drip, rhinorrhea and sore throat. Eyes: Negative for pain and itching. Respiratory: Negative for cough, chest tightness and shortness of breath. Cardiovascular: Negative for chest pain and leg swelling. Gastrointestinal: Negative for abdominal pain, constipation, diarrhea and nausea. Endocrine: Negative for heat intolerance. Genitourinary: Negative for flank pain, frequency and urgency. Musculoskeletal: Negative for arthralgias and back pain. Skin: Positive for rash (right forearm and left foot). Negative for pallor. Allergic/Immunologic: Negative for environmental allergies. Neurological: Negative for dizziness, numbness and headaches. Hematological: Does not bruise/bleed easily. Psychiatric/Behavioral: Negative for agitation, behavioral problems and hallucinations. PHYSICAL EXAM  /81 (Site: Right Upper Arm)   Pulse 89   Resp 14   Ht 5' 11\" (1.803 m)   Wt 237 lb (107.5 kg)   BMI 33.05 kg/m²   Physical Exam   Constitutional: He is oriented to person, place, and time. He appears well-developed and well-nourished. No distress. HENT:   Head: Normocephalic and atraumatic.    Right Ear: External ear normal.   Left Ear: External ear normal.   Nose: Nose normal.   Mouth/Throat: Oropharynx is clear and moist. No oropharyngeal exudate. Eyes: Conjunctivae and EOM are normal. Right eye exhibits no discharge. Left eye exhibits no discharge. Neck: Normal range of motion. Neck supple. Cardiovascular: Normal rate, regular rhythm and normal heart sounds. Exam reveals no gallop and no friction rub. No murmur heard. Pulmonary/Chest: Effort normal and breath sounds normal. No respiratory distress. He has no wheezes. Abdominal: Soft. There is no tenderness. Musculoskeletal: Normal range of motion. He exhibits no edema or tenderness. Neurological: He is alert and oriented to person, place, and time. He has normal reflexes. Coordination normal.   Skin: Skin is warm and dry. Rash (vesicular rash on the right forearm and left foot dry skin noted with some erythema) noted. He is not diaphoretic. No erythema. Psychiatric: He has a normal mood and affect. His behavior is normal. Judgment and thought content normal.   Nursing note and vitals reviewed. Laboratory:   All laboratory and radiology results have been personally reviewed by myself    Lab Results   Component Value Date     04/18/2019    K 4.0 04/18/2019    K 4.2 03/26/2018     04/18/2019    CO2 26 04/18/2019    BUN 12 04/18/2019    CREATININE 1.0 04/18/2019    PROT 7.0 04/18/2019    LABALBU 3.9 04/18/2019    LABALBU 4.4 08/01/2011    CALCIUM 8.9 04/18/2019    GFRAA >60 04/18/2019    LABGLOM >60 04/18/2019    GLUCOSE 96 04/18/2019    GLUCOSE 59 08/01/2011    AST 30 04/18/2019    ALT 31 04/18/2019    ALKPHOS 76 04/18/2019    BILITOT 0.5 04/18/2019    TSH 2.640 04/18/2019    VITD25 46 04/18/2019    CHOL 210 04/18/2019    TRIG 207 04/18/2019    HDL 37 04/18/2019    LDLCALC 132 04/18/2019    LABA1C 5.3 10/17/2017        Lab Results   Component Value Date    CHOL 210 (H) 04/18/2019    CHOL 152 08/27/2018    CHOL 136 09/11/2017     Lab Results   Component Value Date    TRIG 207 (H) 04/18/2019 complications,  especially if left uncontrolled.     Counseled regarding the possible side effects, risks, benefits and alternatives to treatment; patient and/or guardian verbalizes understanding, agrees,feels comfortable with and wishes to proceed with above treatment plan.     Advised patient to call Gely Lass new medication issues, and read all Rx info from pharmacy to assure aware of all possible risks and side effects of medication before taking.     Reviewed age and gender appropriate health screening exams and vaccinations. Advised patient regarding importance of keeping up with recommended health maintenance and toschedule as soon as possible if overdue, as this is important in assessing for undiagnosed pathology, especially cancer, as well as protecting against potentially harmful/life threatening disease.          Patient and/or guardian verbalizes understanding and agrees with above counseling, assessment and plan.     All questions answered. Mohit Tiwari, DO  8/2/19    NOTE: This report was transcribed using voice recognition software.  Every effort was made to ensure accuracy; however, inadvertent computerized transcription errors may be present

## 2019-08-06 ASSESSMENT — ENCOUNTER SYMPTOMS
EYE PAIN: 0
SHORTNESS OF BREATH: 0
CHEST TIGHTNESS: 0
SORE THROAT: 0
NAUSEA: 0
BACK PAIN: 0
COUGH: 0
RHINORRHEA: 0
EYE ITCHING: 0
DIARRHEA: 0
CONSTIPATION: 0
ABDOMINAL PAIN: 0

## 2019-08-27 DIAGNOSIS — Z76.0 MEDICATION REFILL: ICD-10-CM

## 2019-08-27 RX ORDER — ACETAMINOPHEN 160 MG
TABLET,DISINTEGRATING ORAL
Qty: 30 CAPSULE | Refills: 0 | Status: SHIPPED | OUTPATIENT
Start: 2019-08-27 | End: 2019-12-18 | Stop reason: SDUPTHER

## 2019-08-27 RX ORDER — DOCUSATE SODIUM 100 MG/1
CAPSULE, LIQUID FILLED ORAL
Qty: 30 CAPSULE | Refills: 0 | Status: SHIPPED
Start: 2019-08-27 | End: 2022-02-16 | Stop reason: SDUPTHER

## 2019-10-01 RX ORDER — IBUPROFEN 800 MG/1
800 TABLET ORAL 2 TIMES DAILY PRN
Qty: 60 TABLET | Refills: 0 | Status: SHIPPED | OUTPATIENT
Start: 2019-10-01 | End: 2019-12-18 | Stop reason: SDUPTHER

## 2019-12-18 ENCOUNTER — OFFICE VISIT (OUTPATIENT)
Dept: FAMILY MEDICINE CLINIC | Age: 59
End: 2019-12-18
Payer: COMMERCIAL

## 2019-12-18 VITALS
HEART RATE: 86 BPM | BODY MASS INDEX: 33.32 KG/M2 | WEIGHT: 238 LBS | DIASTOLIC BLOOD PRESSURE: 80 MMHG | SYSTOLIC BLOOD PRESSURE: 136 MMHG | HEIGHT: 71 IN

## 2019-12-18 DIAGNOSIS — Z76.0 MEDICATION REFILL: ICD-10-CM

## 2019-12-18 DIAGNOSIS — E78.2 MIXED HYPERLIPIDEMIA: ICD-10-CM

## 2019-12-18 DIAGNOSIS — R73.01 IMPAIRED FASTING GLUCOSE: ICD-10-CM

## 2019-12-18 DIAGNOSIS — I10 ESSENTIAL HYPERTENSION: Primary | ICD-10-CM

## 2019-12-18 DIAGNOSIS — Z12.5 SCREENING PSA (PROSTATE SPECIFIC ANTIGEN): ICD-10-CM

## 2019-12-18 DIAGNOSIS — J30.2 SEASONAL ALLERGIC RHINITIS, UNSPECIFIED TRIGGER: ICD-10-CM

## 2019-12-18 DIAGNOSIS — R35.0 FREQUENCY OF MICTURITION: ICD-10-CM

## 2019-12-18 LAB
BILIRUBIN, POC: NEGATIVE
BLOOD URINE, POC: NEGATIVE
CLARITY, POC: CLEAR
COLOR, POC: YELLOW
GLUCOSE URINE, POC: NEGATIVE
KETONES, POC: NEGATIVE
LEUKOCYTE EST, POC: NEGATIVE
NITRITE, POC: NEGATIVE
PH, POC: 6
PROTEIN, POC: NORMAL
SPECIFIC GRAVITY, POC: 1.02
UROBILINOGEN, POC: NORMAL

## 2019-12-18 PROCEDURE — G8484 FLU IMMUNIZE NO ADMIN: HCPCS | Performed by: FAMILY MEDICINE

## 2019-12-18 PROCEDURE — 99214 OFFICE O/P EST MOD 30 MIN: CPT | Performed by: FAMILY MEDICINE

## 2019-12-18 PROCEDURE — 1036F TOBACCO NON-USER: CPT | Performed by: FAMILY MEDICINE

## 2019-12-18 PROCEDURE — 81002 URINALYSIS NONAUTO W/O SCOPE: CPT | Performed by: FAMILY MEDICINE

## 2019-12-18 PROCEDURE — G8417 CALC BMI ABV UP PARAM F/U: HCPCS | Performed by: FAMILY MEDICINE

## 2019-12-18 PROCEDURE — G8427 DOCREV CUR MEDS BY ELIG CLIN: HCPCS | Performed by: FAMILY MEDICINE

## 2019-12-18 PROCEDURE — 3017F COLORECTAL CA SCREEN DOC REV: CPT | Performed by: FAMILY MEDICINE

## 2019-12-18 RX ORDER — FAMOTIDINE 40 MG/1
40 TABLET, FILM COATED ORAL EVERY EVENING
Qty: 30 TABLET | Refills: 3 | Status: SHIPPED
Start: 2019-12-18 | End: 2020-03-03 | Stop reason: SDUPTHER

## 2019-12-18 RX ORDER — LOSARTAN POTASSIUM 100 MG/1
TABLET ORAL
Qty: 90 TABLET | Refills: 1 | Status: SHIPPED
Start: 2019-12-18 | End: 2020-03-03 | Stop reason: SDUPTHER

## 2019-12-18 RX ORDER — ATORVASTATIN CALCIUM 10 MG/1
TABLET, FILM COATED ORAL
Qty: 90 TABLET | Refills: 1 | Status: SHIPPED
Start: 2019-12-18 | End: 2020-03-03 | Stop reason: SDUPTHER

## 2019-12-18 RX ORDER — FLUTICASONE PROPIONATE 50 MCG
SPRAY, SUSPENSION (ML) NASAL
Qty: 16 G | Refills: 2 | Status: SHIPPED
Start: 2019-12-18 | End: 2020-03-03 | Stop reason: SDUPTHER

## 2019-12-18 RX ORDER — IBUPROFEN 800 MG/1
800 TABLET ORAL 2 TIMES DAILY PRN
Qty: 60 TABLET | Refills: 0 | Status: SHIPPED
Start: 2019-12-18 | End: 2020-09-17 | Stop reason: SDUPTHER

## 2019-12-18 RX ORDER — LORATADINE 10 MG/1
10 TABLET ORAL DAILY
Qty: 90 TABLET | Refills: 1 | Status: SHIPPED
Start: 2019-12-18 | End: 2020-03-03 | Stop reason: SDUPTHER

## 2019-12-18 RX ORDER — METOPROLOL SUCCINATE 50 MG/1
50 TABLET, EXTENDED RELEASE ORAL NIGHTLY
Qty: 90 TABLET | Refills: 1 | Status: SHIPPED
Start: 2019-12-18 | End: 2020-03-03 | Stop reason: SDUPTHER

## 2019-12-18 RX ORDER — ACETAMINOPHEN 160 MG
TABLET,DISINTEGRATING ORAL
Qty: 90 CAPSULE | Refills: 1 | Status: SHIPPED
Start: 2019-12-18 | End: 2020-04-10 | Stop reason: SDUPTHER

## 2019-12-18 ASSESSMENT — ENCOUNTER SYMPTOMS
BACK PAIN: 0
RHINORRHEA: 0
SHORTNESS OF BREATH: 0
DIARRHEA: 0
EYE PAIN: 0
CONSTIPATION: 0
COUGH: 0
NAUSEA: 0
SORE THROAT: 0
ABDOMINAL PAIN: 0
EYE ITCHING: 0
CHEST TIGHTNESS: 0

## 2020-02-03 ENCOUNTER — HOSPITAL ENCOUNTER (OUTPATIENT)
Age: 60
Discharge: HOME | End: 2020-02-05
Payer: COMMERCIAL

## 2020-02-03 LAB — PROSTATE SPECIFIC ANTIGEN: 108.9 NG/ML (ref 0–4)

## 2020-02-03 PROCEDURE — G0103 PSA SCREENING: HCPCS

## 2020-02-04 ENCOUNTER — OFFICE VISIT (OUTPATIENT)
Dept: FAMILY MEDICINE CLINIC | Age: 60
End: 2020-02-04
Payer: COMMERCIAL

## 2020-02-04 ENCOUNTER — TELEPHONE (OUTPATIENT)
Dept: FAMILY MEDICINE CLINIC | Age: 60
End: 2020-02-04

## 2020-02-04 VITALS
BODY MASS INDEX: 36.54 KG/M2 | HEIGHT: 71 IN | WEIGHT: 261 LBS | HEART RATE: 77 BPM | DIASTOLIC BLOOD PRESSURE: 90 MMHG | RESPIRATION RATE: 16 BRPM | SYSTOLIC BLOOD PRESSURE: 140 MMHG | OXYGEN SATURATION: 99 % | TEMPERATURE: 98 F

## 2020-02-04 PROCEDURE — G8417 CALC BMI ABV UP PARAM F/U: HCPCS | Performed by: FAMILY MEDICINE

## 2020-02-04 PROCEDURE — G8427 DOCREV CUR MEDS BY ELIG CLIN: HCPCS | Performed by: FAMILY MEDICINE

## 2020-02-04 PROCEDURE — 3017F COLORECTAL CA SCREEN DOC REV: CPT | Performed by: FAMILY MEDICINE

## 2020-02-04 PROCEDURE — 1036F TOBACCO NON-USER: CPT | Performed by: FAMILY MEDICINE

## 2020-02-04 PROCEDURE — 99214 OFFICE O/P EST MOD 30 MIN: CPT | Performed by: FAMILY MEDICINE

## 2020-02-04 PROCEDURE — G8484 FLU IMMUNIZE NO ADMIN: HCPCS | Performed by: FAMILY MEDICINE

## 2020-02-04 ASSESSMENT — ENCOUNTER SYMPTOMS
DIARRHEA: 0
CHEST TIGHTNESS: 0
COUGH: 0
NAUSEA: 0
RHINORRHEA: 0
CONSTIPATION: 0
EYE ITCHING: 0
EYE PAIN: 0
ABDOMINAL PAIN: 0
SHORTNESS OF BREATH: 0
BACK PAIN: 0
SORE THROAT: 0

## 2020-02-04 NOTE — TELEPHONE ENCOUNTER
Called [facility] to see if they have his A1C results they were 5.3    They are faxing over the rest of his information as well

## 2020-02-04 NOTE — PATIENT INSTRUCTIONS
Patient Education        Deciding Between Radiation and Surgery for Localized Prostate Cancer  How can you decide between radiation and surgery for localized prostate cancer? What is localized prostate cancer? Prostate cancer is the abnormal growth of cells in the prostate gland. Cancer that has not spread outside the prostate gland is called localized prostate cancer. Men with localized prostate cancer have options for their care. Some men with a slowly growing cancer choose active surveillance. This means that their doctors will watch them closely with regular checkups and tests. If their cancer grows, then they can decide about treatment. And some men choose watchful waiting. This means they don't want any treatment that tries to cure their cancer. Other men choose to treat their cancer right away. They can choose to have radiation to destroy the cancer cells in the prostate. Or they can choose to have surgery to remove the prostate (radical prostatectomy). Tests show if a localized prostate cancer is likely to grow. · Low-risk means that the cancer isn't likely to grow right away. There is a chance that it may grow so slowly that it never causes any problems. · Medium-risk means that the cancer is more likely to grow. Most men will likely need treatment with radiation or surgery. · High-risk means that the cancer will most likely grow right away. Men will likely need treatment with radiation or surgery. Some men may have limited treatment choices because of their health. For example, a man who has serious health concerns may not be well enough to have surgery. Or a man who has a serious bowel disease such as ulcerative colitis may not be able to have radiation. Your doctor will help you know if you have options. This information is about choosing between radiation or surgery. What are the key points about this decision? · Radiation or surgery may be used to treat your prostate cancer.  Both I[address] in the [facility] box to learn more about \"Deciding Between Radiation and Surgery for Localized Prostate Cancer. \"     If you do not have an account, please click on the \"Sign Up Now\" link. Current as of: August 21, 2019  Content Version: 12.3  © 0225-4638 Healthwise, [employer]. Care instructions adapted under license by [facility] ([facility]). If you have questions about a medical condition or this instruction, always ask your healthcare professional. [address]  any warranty or liability for your use of this information. Patient Education        Prostate Cancer: Care Instructions  Your Care Instructions    The prostate gland is a small, walnut-shaped organ that lies just below a man's bladder. It surrounds the urethra, the tube that carries urine from the bladder out of the body through the penis. Prostate cancer is the abnormal growth of cells in the prostate gland. Prostate cancer cells can spread within the prostate, to nearby lymph nodes and other tissues, and to other parts of the body. When the cancer hasn't spread outside the prostate, it is called localized prostate cancer. With localized prostate cancer, your options depend on how likely it is that your cancer will grow. Tests will show if your cancer is likely to grow. · Low-risk cancer isn't likely to grow right away. If your cancer is low-risk, you can choose active surveillance. This means your cancer will be watched closely by your doctor with regular checkups and tests to see if the cancer grows. This choice allows you to delay having surgery or radiation, often for many years. If the cancer grows very slowly, you may never need treatment. · Medium-risk cancer is more likely to grow. Some men with this type of cancer may be able to choose active surveillance. Others may need to choose surgery or radiation. · High-risk cancer is most likely to grow.  If you have high-risk cancer, you will likely need to pain in your back just below your rib cage. This is called flank pain. ? You have a fever, chills, or body aches. ? It hurts to urinate. ? You have groin or belly pain.    Watch closely for changes in your health, and be sure to contact your doctor if:    · You have swollen glands in your armpits, groin, or neck.     · You have trouble controlling your urine.     · You do not get better as expected. Where can you learn more? Go to [email or web address] and sign in to your [employer] account. Enter V141 in the [employer] box to learn more about \"Prostate Cancer: Care Instructions. \"     If you do not have an account, please click on the \"Sign Up Now\" link. Current as of: August 21, 2019  Content Version: 12.3  © 2631-9530 [employer]. Care instructions adapted under license by [facility] ([facility]). If you have questions about a medical condition or this instruction, always ask your healthcare professional. [place] [address] any warranty or liability for your use of this information. Patient Education        Brachytherapy for Prostate Cancer: What to Expect at [facility] therapy is a treatment to destroy cancer cells or shrink tumors. One type of radiation therapy is brachytherapy (say \"sjao-scn-FBADM-uh-adrianna\"). It puts the radiation source into your body, either inside the tumor or next to it. Brachytherapy may be used to treat prostate cancer. You may have had:  A low-dose rate treatment. This uses radiation sealed inside tiny containers, such as seeds or tubes. These implants have a low amount of radiation that gets weaker with time. The implants may be left in place for a few days or permanently. A high-dose rate treatment. This puts a high dose of radiation close to the tumor for a few minutes at a time. Then it is removed. The treatments may be repeated over several days or weeks.  The radioactive pellets or seeds can be

## 2020-02-04 NOTE — PROGRESS NOTES
Jese Irving  : 1960    Chief Complaint:     Chief Complaint   Patient presents with    Hypertension     6 month follow up     Blood Work     would like to discuss PSA that was done yesterday        HPI  Jese Irving 64 y.o. presents for   Chief Complaint   Patient presents with    Hypertension     6 month follow up     Blood Work     would like to discuss PSA that was done yesterday      Patient presents for follow-up today. He recently follow-up with urology and his exam was possibly suspicious for cancer of his prostate. He did have a PSA which was dated at 100. He states he has not heard back from urology for possible prostate biopsy. He is anxious about this. There is no family history of prostate cancer. He does wish for more information about possible treatment for prostate cancer. His blood pressure is elevated today but likely due to the above conversation. He states he has been taking all of his medications as prescribed. He denies any dizziness, lightheadedness, chest pain, shortness of breath, swelling anywhere, neurologic changes. All questions were answered to patients satisfaction. Past Medical History:   Diagnosis Date    Acute drug-induced gout of left foot 2015    Had gout attack on 7/18/15. Seems to be HCTZ induced.       ADHD (attention deficit hyperactivity disorder)     Back pain, chronic     Depression     Hyperlipidemia     Hypertension     Left elbow fracture 10/27/2015    MRSA (methicillin resistant staph aureus) culture positive     Seasonal allergies        Past Surgical History:   Procedure Laterality Date    APPENDECTOMY  1972    COLECTOMY Bilateral 2019    COLONOSCOPY  2013    sigmoid diverticulosis, Dr. Laurel Arnold, [address] [place] [address] COLONOSCOPY  2013    ENDOSCOPIC ULTRASOUND (LOWER) Bilateral 2019    upper    FOOT SURGERY      RIGHT DUE TO FX    [facility]UM[facility]OMY      [facility]     OTHER SURGICAL HISTORY 03/28/2018    removal and replacement of spinal cord stimulator    [place] IMPLANT NEUROSTIM/ N/A 3/28/2018    REMOVAL OF T7 - T8 MALFUCTIONING  STIMULATOR, REPLACEMENT OF T7- T8 SPINAL CORD STIMULATOR --CASTILLO MURILLO, [employer] performed by Roslyn Pascal MD at [place] [address]  2009    insertion of spinal cord stimulator and since then has had 8 different surgeries to take it out and put it in due to MRSA infections    SPINE SURGERY  10/11/2011    INSERTION OF SPINAL CORD STIMULATOR    SPINE SURGERY  6/2012    change of spinal cord stimulator, Dr. Evonnie Cabot, [facility] History     Socioeconomic History    Marital status:      Spouse name: None    Number of children: None    Years of education: None    Highest education level: None   Occupational History    Occupation: retired    Social Needs    Financial resource strain: None    Food insecurity:     Worry: None     Inability: None    Transportation needs:     Medical: None     Non-medical: None   Tobacco Use    Smoking status: Never Smoker    Smokeless tobacco: Never Used   Substance and Sexual Activity    Alcohol use: Yes     Comment: rare    Drug use: Yes     Comment: used to do cocaine 2013    Sexual activity: Not Currently   Lifestyle    Physical activity:     Days per week: None     Minutes per session: None    Stress: None   Relationships    Social connections:     Talks on phone: None     Gets together: None     Attends Jew service: None     Active member of club or organization: None     Attends meetings of clubs or organizations: None     Relationship status: None    Intimate partner violence:     Fear of current or ex partner: None     Emotionally abused: None     Physically abused: None     Forced sexual activity: None   Other Topics Concern    None   Social History Narrative    None       Family History   Problem Relation Age of Onset    Heart Failure Mother         CABG at 61     Heart Disease Mother     High Blood Pressure Mother     Asthma Mother     Heart Failure Father         CABG at 63's    Hypertension Father     Diabetes Father     Heart Disease Father     High Blood Pressure Father           Current Outpatient Medications   Medication Sig Dispense Refill    famotidine (PEPCID) 40 MG tablet Take 1 tablet by mouth every evening 30 tablet 3    losartan (COZAAR) 100 MG tablet TAKE ONE TABLET BY MOUTH EVERY DAY 90 tablet 1    atorvastatin (LIPITOR) 10 MG tablet TAKE ONE TABLET BY MOUTH EVERY DAY 90 tablet 1    loratadine (CLARITIN) 10 MG tablet Take 1 tablet by mouth daily 90 tablet 1    aspirin 81 MG tablet Take 1 tablet by mouth daily 90 tablet 1    metoprolol succinate (TOPROL XL) 50 MG extended release tablet Take 1 tablet by mouth nightly 90 tablet 1    fluticasone (FLONASE) 50 MCG/ACT nasal spray PLACE 1 OR 2 SPRAYS INTO EACH NOSTRIL ONCE DAILY AS DIRECTED 16 g 2    ibuprofen (ADVIL;MOTRIN) 800 MG tablet Take 1 tablet by mouth 2 times daily as needed for Pain 60 tablet 0    Cholecalciferol (VITAMIN D3) 50 MCG (2000 UT) CAPS Take one tab po daily 90 capsule 1     MG capsule TAKE ONE CAPSULE BY MOUTH ONCE DAILY AS NEEDED FOR CONSTIPATION 30 capsule 0    Probiotic Product (ADVANCED PROBIOTIC 10) CAPS Take 1 capsule by mouth daily 90 capsule 1    ketorolac (ACULAR) 0.5 % ophthalmic solution Place 1 drop into both eyes 4 times daily 1 Bottle 0    clotrimazole-betamethasone (LOTRISONE) 1-0.05 % cream Apply topically 2 times daily. 1 Tube 0    triamcinolone (KENALOG) 0.1 % cream Apply topically 2 times daily. 45 g 0    EPINEPHrine (EPIPEN 2-EVANGELISTA) 0.3 MG/0.3ML SOAJ injection Inject 0.3 mg into the muscle as needed Use as directed for allergic reaction      desonide (DESOWEN) 0.05 % cream Apply to affected area on face and neck twice daily, 5 days on 2 days off, as needed.       fluocinonide (LIDEX) 0.05 % cream Apply to affected area twice daily, 5 days on 2 days off, as needed. Not for face, armpits, or groin.  NITROGLYCERIN SL Place under the tongue as needed       Psyllium (METAMUCIL FIBER PO) Take by mouth      amphetamine-dextroamphetamine (ADDERALL) 30 MG tablet Take 30 mg by mouth daily. Cl[place]de Call lisdexamfetamine (VYVANSE) 70 MG capsule Take 1 capsule by mouth daily.  sildenafil (REVATIO) 20 MG tablet Take 2 or 3 tabs daily 1 hour (range: 30 minutes to 4 hours) before sexual activity as needed. Maximum 100 mg daily 60 tablet 0    mineral oil-hydrophilic petrolatum (AQUAPHOR) ointment Apply topically as needed. 396 g 3     No current facility-administered medications for this visit. Allergies   Allergen Reactions    Bee Venom     Dye [Iodides] Other (See Comments)     Patient reports father had an anaphylactic reaction to iodide which result in death    Iodine Solution [Povidone Iodine] Rash     TOPICAL AND INJECTED    Sulfa Antibiotics Rash    Topical Sulfur Rash       Health Maintenance Due   Topic Date Due    Shingles Vaccine (1 of 2) 07/18/2012    Annual Wellness Visit (AWV)  05/29/2019           REVIEW OF SYSTEMS  Review of Systems   Constitutional: Negative for chills, fatigue and fever. HENT: Negative for congestion, ear pain, postnasal drip, rhinorrhea and sore throat. Eyes: Negative for pain and itching. Respiratory: Negative for cough, chest tightness and shortness of breath. Cardiovascular: Negative for chest pain and leg swelling. Gastrointestinal: Negative for abdominal pain, constipation, diarrhea and nausea. Endocrine: Negative for heat intolerance. Genitourinary: Positive for frequency. Negative for flank pain and urgency. Musculoskeletal: Negative for arthralgias and back pain. Skin: Negative for pallor and rash. Allergic/Immunologic: Negative for environmental allergies. Neurological: Negative for dizziness, numbness and headaches. Hematological: Does not bruise/bleed easily. understands. Otherwise continue all other medications at this time    Problem list reviewed andsimplified/updated  HM reviewed today and counseled as appropriate    Call or go to ED immediately if symptoms worsen or persist.  Future Appointments   Date Time Provider Sanjuanita Dueñas   4/7/2020  8:45 AM DO Seth Clemenswmiguel ángel [facility]     Or sooner if necessary. Educational materials and/or homeexercises printed for patient's review and were included in patient instructions on his/her After Visit Summary and given to patient at the end of visit.       Counseled regarding above diagnosis, including possible risks and complications,  especially if left uncontrolled.     Counseled regarding the possible side effects, risks, benefits and alternatives to treatment; patient and/or guardian verbalizes understanding, agrees,feels comfortable with and wishes to proceed with above treatment plan.     Advised patient to call Dashawn Blocker new medication issues, and read all Rx info from pharmacy to assure aware of all possible risks and side effects of medication before taking.     Reviewed age and gender appropriate health screening exams and vaccinations. Advised patient regarding importance of keeping up with recommended health maintenance and toschedule as soon as possible if overdue, as this is important in assessing for undiagnosed pathology, especially cancer, as well as protecting against potentially harmful/life threatening disease.          Patient and/or guardian verbalizes understanding and agrees with above counseling, assessment and plan.     All questions answered. Mohit Tiwari DO  2/4/20    NOTE: This report was transcribed using voice recognition software.  Every effort was made to ensure accuracy; however, inadvertent computerized transcription errors may be present

## 2020-02-07 ENCOUNTER — HOSPITAL ENCOUNTER (OUTPATIENT)
Age: 60
Discharge: HOME | End: 2020-02-09
Payer: COMMERCIAL

## 2020-02-07 PROCEDURE — 88305 TISSUE EXAM BY PATHOLOGIST: CPT

## 2020-02-20 ENCOUNTER — HOSPITAL ENCOUNTER (OUTPATIENT)
Dept: NUCLEAR MEDICINE | Age: 60
Discharge: HOME | End: 2020-02-20
Payer: COMMERCIAL

## 2020-02-20 ENCOUNTER — HOSPITAL ENCOUNTER (OUTPATIENT)
Dept: CT IMAGING | Age: 60
Discharge: HOME | End: 2020-02-20
Payer: COMMERCIAL

## 2020-02-20 PROCEDURE — A9503 TC99M MEDRONATE: HCPCS | Performed by: RADIOLOGY

## 2020-02-20 PROCEDURE — 3430000000 HC RX DIAGNOSTIC RADIOPHARMACEUTICAL: Performed by: RADIOLOGY

## 2020-02-20 PROCEDURE — 78306 BONE IMAGING WHOLE BODY: CPT

## 2020-02-20 PROCEDURE — 74176 CT ABD & PELVIS W/O CONTRAST: CPT

## 2020-02-20 RX ORDER — TC 99M MEDRONATE 20 MG/10ML
25 INJECTION, POWDER, LYOPHILIZED, FOR SOLUTION INTRAVENOUS
Status: COMPLETED | OUTPATIENT
Start: 2020-02-20 | End: 2020-02-20

## 2020-02-20 RX ADMIN — TC 99M MEDRONATE 25 MILLICURIE: 20 INJECTION, POWDER, LYOPHILIZED, FOR SOLUTION INTRAVENOUS at 09:45

## 2020-03-03 ENCOUNTER — TELEPHONE (OUTPATIENT)
Dept: FAMILY MEDICINE CLINIC | Age: 60
End: 2020-03-03

## 2020-03-03 RX ORDER — METOPROLOL SUCCINATE 50 MG/1
50 TABLET, EXTENDED RELEASE ORAL NIGHTLY
Qty: 90 TABLET | Refills: 1 | Status: SHIPPED | OUTPATIENT
Start: 2020-03-03 | End: 2020-09-11 | Stop reason: SDUPTHER

## 2020-03-03 RX ORDER — FAMOTIDINE 40 MG/1
40 TABLET, FILM COATED ORAL EVERY EVENING
Qty: 90 TABLET | Refills: 1 | Status: SHIPPED | OUTPATIENT
Start: 2020-03-03 | End: 2020-09-11 | Stop reason: SDUPTHER

## 2020-03-03 RX ORDER — LORATADINE 10 MG/1
10 TABLET ORAL DAILY
Qty: 90 TABLET | Refills: 1 | Status: SHIPPED | OUTPATIENT
Start: 2020-03-03 | End: 2020-09-11 | Stop reason: SDUPTHER

## 2020-03-03 RX ORDER — ATORVASTATIN CALCIUM 10 MG/1
TABLET, FILM COATED ORAL
Qty: 90 TABLET | Refills: 1 | Status: SHIPPED
Start: 2020-03-03 | End: 2020-09-08 | Stop reason: SDUPTHER

## 2020-03-03 RX ORDER — FLUTICASONE PROPIONATE 50 MCG
SPRAY, SUSPENSION (ML) NASAL
Qty: 16 G | Refills: 2 | Status: SHIPPED | OUTPATIENT
Start: 2020-03-03 | End: 2020-09-11 | Stop reason: SDUPTHER

## 2020-03-03 RX ORDER — LOSARTAN POTASSIUM 100 MG/1
TABLET ORAL
Qty: 90 TABLET | Refills: 1 | Status: SHIPPED | OUTPATIENT
Start: 2020-03-03 | End: 2020-09-11 | Stop reason: SDUPTHER

## 2020-03-03 NOTE — TELEPHONE ENCOUNTER
Pt called left voice message requesting the following meds:    90 day supply on all please:    Loratadine  Metoprolol  Atorvastatin  Asprin  Losartan  Motrin 800  Flonase     Pt contact [phone]

## 2020-03-04 ENCOUNTER — HOSPITAL ENCOUNTER (OUTPATIENT)
Dept: NUCLEAR MEDICINE | Age: 60
Discharge: HOME | End: 2020-03-04
Payer: COMMERCIAL

## 2020-03-04 PROCEDURE — 3430000000 HC RX DIAGNOSTIC RADIOPHARMACEUTICAL: Performed by: UROLOGY

## 2020-03-04 PROCEDURE — 78815 PET IMAGE W/CT SKULL-THIGH: CPT

## 2020-03-04 PROCEDURE — A9588 FLUCICLOVINE F-18: HCPCS | Performed by: UROLOGY

## 2020-03-04 RX ADMIN — FLUCICLOVINE F-18 9.84 MILLICURIE: 221 INJECTION, SOLUTION INTRAVENOUS at 14:21

## 2020-04-07 ENCOUNTER — E-VISIT (OUTPATIENT)
Dept: FAMILY MEDICINE CLINIC | Age: 60
End: 2020-04-07
Payer: COMMERCIAL

## 2020-04-07 PROCEDURE — 99423 OL DIG E/M SVC 21+ MIN: CPT | Performed by: FAMILY MEDICINE

## 2020-04-07 NOTE — PROGRESS NOTES
Subjective:        Morelia Brizuela is a 64 y.o. male  Presents for results of biopsy confirmed prostate cancer. Biopsy was performed by lisha and revealed adenocarcinoma of the prostate. Patient would like to discuss treatment options. PSA at the time of diagnosis was 108 ng/ml. Mora score is 4+3 = 7. CT did show enlarged lymph node in pericaval region. Technically this is metastatic Bone scan was negative. Patient reports issues with straining to void. Urology is considering TURP in the future. Biopsy Results(Mable Scale)  4+3= 7    Patient's medications, allergies, past medical, surgical, social and family histories were reviewed and updated as appropriate. Review of Systems  Pertinent items are noted in HPI. Objective:      No exam performed today, e visit . Lab Review  Lab Results   Component Value Date    .90 02/03/2020         Assessment:      advanced prostate cancer. Plan:     Continue to follow with urology as recommended. Continue current treatment with LHRH agonists and xtandi. continue flomax as directed. Call with any questions or concerns.      21 or more minutes were spent on the digital evaluation and management of this patient (review of medical records including records from urologist and plan reviewed personally for this encounter)

## 2020-04-09 ASSESSMENT — PATIENT HEALTH QUESTIONNAIRE - PHQ9
SUM OF ALL RESPONSES TO PHQ QUESTIONS 1-9: 0
SUM OF ALL RESPONSES TO PHQ QUESTIONS 1-9: 0

## 2020-04-09 ASSESSMENT — LIFESTYLE VARIABLES: HOW OFTEN DO YOU HAVE A DRINK CONTAINING ALCOHOL: 1

## 2020-04-10 ENCOUNTER — TELEMEDICINE (OUTPATIENT)
Dept: FAMILY MEDICINE CLINIC | Age: 60
End: 2020-04-10
Payer: COMMERCIAL

## 2020-04-10 VITALS
DIASTOLIC BLOOD PRESSURE: 104 MMHG | HEIGHT: 71 IN | SYSTOLIC BLOOD PRESSURE: 159 MMHG | BODY MASS INDEX: 33.6 KG/M2 | RESPIRATION RATE: 16 BRPM | WEIGHT: 240 LBS

## 2020-04-10 PROCEDURE — 3017F COLORECTAL CA SCREEN DOC REV: CPT | Performed by: FAMILY MEDICINE

## 2020-04-10 PROCEDURE — G0438 PPPS, INITIAL VISIT: HCPCS | Performed by: FAMILY MEDICINE

## 2020-04-10 RX ORDER — ACETAMINOPHEN 160 MG
TABLET,DISINTEGRATING ORAL
Qty: 90 CAPSULE | Refills: 1 | Status: SHIPPED | OUTPATIENT
Start: 2020-04-10 | End: 2020-09-11 | Stop reason: SDUPTHER

## 2020-04-10 RX ORDER — PHENOL 1.4 %
1 AEROSOL, SPRAY (ML) MUCOUS MEMBRANE DAILY
Qty: 90 TABLET | Refills: 1 | Status: SHIPPED
Start: 2020-04-10 | End: 2020-08-13 | Stop reason: SDUPTHER

## 2020-04-10 NOTE — PROGRESS NOTES
Medicare Annual Wellness Visit  Name: Gus Cassidy Date: 4/10/2020   MRN: [id number] Sex: Male   Age: 64 y.o. Ethnicity: Non-/Non    : 1960 Race: Theodor Boy is here for Medicare AWV    Screenings for behavioral, psychosocial and functional/safety risks, and cognitive dysfunction are all negative except as indicated below. These results, as well as other patient data from the [address] form, are documented in Flowsheets linked to this Encounter. Allergies   Allergen Reactions    Bee Venom     Dye [Iodides] Other (See Comments)     Patient reports father had an anaphylactic reaction to iodide which result in death    Iodine Solution [Povidone Iodine] Rash     TOPICAL AND INJECTED    Sulfa Antibiotics Rash    Topical Sulfur Rash         Prior to Visit Medications    Medication Sig Taking?  Authorizing Provider   loratadine (CLARITIN) 10 MG tablet Take 1 tablet by mouth daily Yes Jacek Argueta, DO   atorvastatin (LIPITOR) 10 MG tablet TAKE ONE TABLET BY MOUTH EVERY DAY Yes Kasey Laird DO   losartan (COZAAR) 100 MG tablet TAKE ONE TABLET BY MOUTH EVERY DAY Yes Kasey Laird DO   famotidine (PEPCID) 40 MG tablet Take 1 tablet by mouth every evening Yes Kasey Laird DO   fluticasone (FLONASE) 50 MCG/ACT nasal spray PLACE 1 OR 2 SPRAYS INTO EACH NOSTRIL ONCE DAILY AS DIRECTED Yes William Laird DO   metoprolol succinate (TOPROL XL) 50 MG extended release tablet Take 1 tablet by mouth nightly Yes William Laird DO   aspirin 81 MG tablet Take 1 tablet by mouth daily Yes William Laird DO   ibuprofen (ADVIL;MOTRIN) 800 MG tablet Take 1 tablet by mouth 2 times daily as needed for Pain Yes Kasey Laird DO    MG capsule TAKE ONE CAPSULE BY MOUTH ONCE DAILY AS NEEDED FOR CONSTIPATION Yes William Laird DO   Probiotic Product (ADVANCED PROBIOTIC 10) CAPS Take 1 capsule by mouth daily Yes aJcek Argueta, DO ketorolac (ACULAR) 0.5 % ophthalmic solution Place 1 drop into both eyes 4 times daily Yes Keven Laird, DO   clotrimazole-betamethasone (LOTRISONE) 1-0.05 % cream Apply topically 2 times daily. Yes Keven Laird, DO   triamcinolone (KENALOG) 0.1 % cream Apply topically 2 times daily. Yes [address], DO   EPINEPHrine (EPIPEN 2-EVANGELISTA) 0.3 MG/0.3ML SOAJ injection Inject 0.3 mg into the muscle as needed Use as directed for allergic reaction Yes Historical Provider, MD   desonide (DESOWEN) 0.05 % cream Apply to affected area on face and neck twice daily, 5 days on 2 days off, as needed. Yes Historical Provider, MD   fluocinonide (LIDEX) 0.05 % cream Apply to affected area twice daily, 5 days on 2 days off, as needed. Not for face, armpits, or groin. Yes Historical Provider, MD   NITROGLYCERIN SL Place under the tongue as needed  Yes Historical Provider, MD   Psyllium (METAMUCIL FIBER PO) Take by mouth Yes Historical Provider, MD   amphetamine-dextroamphetamine (ADDERALL) 30 MG tablet Take 30 mg by mouth daily. . Yes Historical Provider, MD   lisdexamfetamine (VYVANSE) 70 MG capsule Take 1 capsule by mouth daily. Yes Historical Provider, MD   sildenafil (REVATIO) 20 MG tablet Take 2 or 3 tabs daily 1 hour (range: 30 minutes to 4 hours) before sexual activity as needed. Maximum 100 mg daily Yes Jesus Alberto Maria,    mineral oil-hydrophilic petrolatum (AQUAPHOR) ointment Apply topically as needed. Yes Louise Armendariz MD   Cholecalciferol (VITAMIN D3) 50 MCG (2000 UT) CAPS Take one tab po daily  Keven Laird,    calcium carbonate (CALCIUM 600) 600 MG TABS tablet Take 1 tablet by mouth daily  [address], DO         Past Medical History:   Diagnosis Date    Acute drug-induced gout of left foot 8/29/2015    Had gout attack on 7/18/15. Seems to be HCTZ induced.       ADHD (attention deficit hyperactivity disorder)     Back pain, chronic     Depression     Hyperlipidemia     Hypertension     Left elbow fracture 10/27/2015    MRSA (methicillin resistant staph aureus) culture positive     Seasonal allergies        Past Surgical History:   Procedure Laterality Date    APPENDECTOMY  1972    COLECTOMY Bilateral 02/2019    COLONOSCOPY  2/1/2013    sigmoid diverticulosis, Dr. Maricel Wright, [facility] COLONOSCOPY  02/01/2013    ENDOSCOPIC ULTRASOUND (LOWER) Bilateral 02/2019    upper    FOOT SURGERY  1990s    RIGHT DUE TO FX    [facility]  [facility]     OTHER SURGICAL HISTORY  03/28/2018    removal and replacement of spinal cord stimulator    [place] IMPLANT NEUROSTIM/ N/A 3/28/2018    REMOVAL OF T7 - T8 MALFUCTIONING  STIMULATOR, REPLACEMENT OF T7- T8 SPINAL CORD STIMULATOR --LIDIA, CASTILLO TABLE, [employer] performed by Poonam Barnard MD at [place] [address]  2009    insertion of spinal cord stimulator and since then has had 8 different surgeries to take it out and put it in due to MRSA infections    SPINE SURGERY  10/11/2011    INSERTION OF SPINAL CORD STIMULATOR    SPINE SURGERY  6/2012    change of spinal cord stimulator, Dr. Keiko Falk, [address] [facility]         Family History   Problem Relation Age of Onset    Heart Failure Mother         CABG at 61     Heart Disease Mother     High Blood Pressure Mother     Asthma Mother     Heart Failure Father         CABG at 63's    Hypertension Father     Diabetes Father     Heart Disease Father     High Blood Pressure Father        CareTeam (Including outside providers/suppliers regularly involved in providing care):   Patient Care Team:  Chaz Drosey DO as PCP - General (Family Medicine)  Chaz Dorsey DO as PCP - REHABILITATION [facility] Empaneled Provider  Poonam Barnard MD as Surgeon (Neurosurgery)  Tenzin Brenner MD as Consulting Physician (Neurosurgery)    Wt Readings from Last 3 Encounters:   04/10/20 240 lb (108.9 kg)   02/04/20 261 lb (118.4 kg)   12/18/19 238 lb (108 kg)     Vitals:    04/10/20 1202   BP: (!) 159/104 Site: Right Upper Arm   Position: Sitting   Cuff Size: Large Adult   Resp: 16   Weight: 240 lb (108.9 kg)   Height: 5' 11\" (1.803 m)     Body mass index is 33.47 kg/m². Based upon direct observation of the patient, evaluation of cognition reveals recent and remote memory intact. Patient's complete Health Risk Assessment and screening values have been reviewed and are found in Flowsheets. The following problems were reviewed today and where indicated follow up appointments were made and/or referrals ordered. Positive Risk Factor Screenings with Interventions:     Substance Abuse:  Social History     Tobacco History     Smoking Status  Never Smoker    Smokeless Tobacco Use  Never Used          Alcohol History     Alcohol Use Status  Yes Comment  rare          Drug Use     Drug Use Status  Yes Comment  used to do cocaine 2013          Sexual Activity     Sexually Active  Not Currently               Audit Questionnaire: Screen for Alcohol Misuse  How often do you have a drink containing alcohol?: Monthly or less  Substance Abuse Interventions:  · No interventions at this time    Health Habits/Nutrition:  Health Habits/Nutrition  Do you exercise for at least 20 minutes 2-3 times per week?: Yes  Have you lost any weight without trying in the past 3 months?: No  Do you eat fewer than 2 meals per day?: No  Have you seen a dentist within the past year?: Yes  Body mass index is 33.47 kg/m². Health Habits/Nutrition Interventions:  · No interventions needed at this time.     Personalized Preventive Plan   Current Health Maintenance Status  Immunization History   Administered Date(s) Administered    Influenza Virus Vaccine 10/30/2012, 11/20/2013, 10/14/2015, 09/26/2018    Influenza, MDCK Quadv, with preserv IM (Flucelvax 4 yrs and older) 10/17/2017    Influenza, Quadv, IM, (6 mo and older Fluzone, Flulaval, Fluarix and 3 yrs and older Afluria) 11/02/2016    PPD Test 02/24/2017    Tdap (Boostrix, Adacel)

## 2020-08-13 RX ORDER — PHENOL 1.4 %
1 AEROSOL, SPRAY (ML) MUCOUS MEMBRANE DAILY
Qty: 90 TABLET | Refills: 1 | Status: SHIPPED
Start: 2020-08-13 | End: 2021-01-20 | Stop reason: SDUPTHER

## 2020-09-08 ENCOUNTER — TELEPHONE (OUTPATIENT)
Dept: FAMILY MEDICINE CLINIC | Age: 60
End: 2020-09-08

## 2020-09-08 RX ORDER — ATORVASTATIN CALCIUM 10 MG/1
TABLET, FILM COATED ORAL
Qty: 90 TABLET | Refills: 1 | Status: SHIPPED | OUTPATIENT
Start: 2020-09-08 | End: 2020-09-11 | Stop reason: SDUPTHER

## 2020-09-10 ENCOUNTER — TELEPHONE (OUTPATIENT)
Dept: FAMILY MEDICINE CLINIC | Age: 60
End: 2020-09-10

## 2020-09-11 RX ORDER — FLUTICASONE PROPIONATE 50 MCG
SPRAY, SUSPENSION (ML) NASAL
Qty: 16 G | Refills: 2 | Status: SHIPPED
Start: 2020-09-11 | End: 2021-08-31 | Stop reason: SDUPTHER

## 2020-09-11 RX ORDER — EPINEPHRINE 0.3 MG/.3ML
0.3 INJECTION SUBCUTANEOUS PRN
Qty: 1 EACH | Refills: 1 | Status: SHIPPED
Start: 2020-09-11 | End: 2022-03-17 | Stop reason: SDUPTHER

## 2020-09-11 RX ORDER — ATORVASTATIN CALCIUM 10 MG/1
TABLET, FILM COATED ORAL
Qty: 90 TABLET | Refills: 1 | Status: SHIPPED
Start: 2020-09-11 | End: 2021-01-20 | Stop reason: SDUPTHER

## 2020-09-11 RX ORDER — METOPROLOL SUCCINATE 50 MG/1
50 TABLET, EXTENDED RELEASE ORAL NIGHTLY
Qty: 90 TABLET | Refills: 1 | Status: SHIPPED
Start: 2020-09-11 | End: 2020-10-15

## 2020-09-11 RX ORDER — ACETAMINOPHEN 160 MG
TABLET,DISINTEGRATING ORAL
Qty: 90 CAPSULE | Refills: 1 | Status: SHIPPED
Start: 2020-09-11 | End: 2021-01-20 | Stop reason: SDUPTHER

## 2020-09-11 RX ORDER — LORATADINE 10 MG/1
10 TABLET ORAL DAILY
Qty: 90 TABLET | Refills: 1 | Status: SHIPPED
Start: 2020-09-11 | End: 2021-01-20 | Stop reason: SDUPTHER

## 2020-09-11 RX ORDER — ASPIRIN 81 MG/1
81 TABLET ORAL DAILY
Qty: 90 TABLET | Refills: 1 | Status: SHIPPED
Start: 2020-09-11 | End: 2021-01-20 | Stop reason: SDUPTHER

## 2020-09-11 RX ORDER — LOSARTAN POTASSIUM 100 MG/1
TABLET ORAL
Qty: 90 TABLET | Refills: 1 | Status: SHIPPED
Start: 2020-09-11 | End: 2021-01-20 | Stop reason: SDUPTHER

## 2020-09-11 RX ORDER — FAMOTIDINE 40 MG/1
40 TABLET, FILM COATED ORAL EVERY EVENING
Qty: 90 TABLET | Refills: 1 | Status: SHIPPED
Start: 2020-09-11 | End: 2021-01-20 | Stop reason: SDUPTHER

## 2020-09-17 ENCOUNTER — TELEPHONE (OUTPATIENT)
Dept: FAMILY MEDICINE CLINIC | Age: 60
End: 2020-09-17

## 2020-09-17 RX ORDER — IBUPROFEN 800 MG/1
800 TABLET ORAL 2 TIMES DAILY PRN
Qty: 60 TABLET | Refills: 0 | Status: SHIPPED
Start: 2020-09-17 | End: 2020-11-18 | Stop reason: SDUPTHER

## 2020-10-15 ENCOUNTER — OFFICE VISIT (OUTPATIENT)
Dept: FAMILY MEDICINE CLINIC | Age: 60
End: 2020-10-15
Payer: COMMERCIAL

## 2020-10-15 VITALS
OXYGEN SATURATION: 98 % | SYSTOLIC BLOOD PRESSURE: 162 MMHG | BODY MASS INDEX: 34.02 KG/M2 | WEIGHT: 243 LBS | DIASTOLIC BLOOD PRESSURE: 94 MMHG | TEMPERATURE: 96.9 F | HEART RATE: 85 BPM | HEIGHT: 71 IN | RESPIRATION RATE: 20 BRPM

## 2020-10-15 PROCEDURE — G0009 ADMIN PNEUMOCOCCAL VACCINE: HCPCS | Performed by: FAMILY MEDICINE

## 2020-10-15 PROCEDURE — 1036F TOBACCO NON-USER: CPT | Performed by: FAMILY MEDICINE

## 2020-10-15 PROCEDURE — 99214 OFFICE O/P EST MOD 30 MIN: CPT | Performed by: FAMILY MEDICINE

## 2020-10-15 PROCEDURE — G8482 FLU IMMUNIZE ORDER/ADMIN: HCPCS | Performed by: FAMILY MEDICINE

## 2020-10-15 PROCEDURE — G0008 ADMIN INFLUENZA VIRUS VAC: HCPCS | Performed by: FAMILY MEDICINE

## 2020-10-15 PROCEDURE — 90732 PPSV23 VACC 2 YRS+ SUBQ/IM: CPT | Performed by: FAMILY MEDICINE

## 2020-10-15 PROCEDURE — 90686 IIV4 VACC NO PRSV 0.5 ML IM: CPT | Performed by: FAMILY MEDICINE

## 2020-10-15 PROCEDURE — G8417 CALC BMI ABV UP PARAM F/U: HCPCS | Performed by: FAMILY MEDICINE

## 2020-10-15 PROCEDURE — G8427 DOCREV CUR MEDS BY ELIG CLIN: HCPCS | Performed by: FAMILY MEDICINE

## 2020-10-15 PROCEDURE — 3017F COLORECTAL CA SCREEN DOC REV: CPT | Performed by: FAMILY MEDICINE

## 2020-10-15 RX ORDER — LACTOBACILL 46/B.ANIMAL/INULIN 10B-100 MG
1 CAPSULE ORAL DAILY
Qty: 90 CAPSULE | Refills: 1 | Status: SHIPPED
Start: 2020-10-15 | End: 2021-01-20 | Stop reason: SDUPTHER

## 2020-10-15 RX ORDER — METOPROLOL SUCCINATE 100 MG/1
100 TABLET, EXTENDED RELEASE ORAL NIGHTLY
Qty: 90 TABLET | Refills: 1 | Status: SHIPPED
Start: 2020-10-15 | End: 2020-11-18

## 2020-10-15 RX ORDER — TAMSULOSIN HYDROCHLORIDE 0.4 MG/1
CAPSULE ORAL
COMMUNITY
Start: 2020-07-14

## 2020-10-15 RX ORDER — KETOROLAC TROMETHAMINE 5 MG/ML
1 SOLUTION OPHTHALMIC 4 TIMES DAILY
Qty: 1 BOTTLE | Refills: 0 | Status: SHIPPED | OUTPATIENT
Start: 2020-10-15

## 2020-10-15 RX ORDER — ENZALUTAMIDE 40 MG/1
CAPSULE ORAL
COMMUNITY
Start: 2020-10-01

## 2020-10-15 ASSESSMENT — ENCOUNTER SYMPTOMS
ABDOMINAL PAIN: 0
EYE ITCHING: 0
SORE THROAT: 0
CONSTIPATION: 0
EYE PAIN: 0
CHEST TIGHTNESS: 0
SHORTNESS OF BREATH: 0
BACK PAIN: 0
DIARRHEA: 0
RHINORRHEA: 0
NAUSEA: 0
COUGH: 0

## 2020-10-15 NOTE — PROGRESS NOTES
Tony Farrar  : 1960    Chief Complaint:     Chief Complaint   Patient presents with    Hypertension       HPI  Tony Farrar 64 y.o. presents for   Chief Complaint   Patient presents with    Hypertension     Patient presents for follow-up today. He states he has been doing well overall. He does admit that his blood pressure has been elevated recently. He states it is consistently in the 140s. He has been taking all of his medications as prescribed. He continues to follow with urology for prostate CA with last PSA being very low. He is due for flu and pneumonia vaccine. He does need blood work completed today. He denies any dizziness, lightheadedness, chest pain, shortness of breath, swelling, neurologic changes. All questions were answered to patients satisfaction. Past Medical History:   Diagnosis Date    Acute drug-induced gout of left foot 2015    Had gout attack on 7/18/15. Seems to be HCTZ induced.       ADHD (attention deficit hyperactivity disorder)     Back pain, chronic     Depression     Hyperlipidemia     Hypertension     Left elbow fracture 10/27/2015    MRSA (methicillin resistant staph aureus) culture positive     Seasonal allergies        Past Surgical History:   Procedure Laterality Date    APPENDECTOMY  1972    COLECTOMY Bilateral 2019    COLONOSCOPY  2013    sigmoid diverticulosis, Dr. Lee Re, [address] [place] [address] COLONOSCOPY  2013    ENDOSCOPIC ULTRASOUND (LOWER) Bilateral 2019    upper    FOOT SURGERY      RIGHT DUE TO FX    LUMBAR DISCECTOMY      [facility]     OTHER SURGICAL HISTORY  2018    removal and replacement of spinal cord stimulator    NE IMPLANT NEUROSTIM/ N/A 3/28/2018    REMOVAL OF T7 - T8 MALFUCTIONING  STIMULATOR, REPLACEMENT OF T7- T8 SPINAL CORD STIMULATOR --CASTILLO MURILLO, [employer] performed by Benigno Wilcox MD at [place] [address]      insertion of spinal cord tamsulosin (FLOMAX) 0.4 MG capsule       Probiotic Product (ADVANCED PROBIOTIC 10) CAPS Take 1 capsule by mouth daily 90 capsule 1    ketorolac (ACULAR) 0.5 % ophthalmic solution Place 1 drop into both eyes 4 times daily 1 Bottle 0    metoprolol succinate (TOPROL XL) 100 MG extended release tablet Take 1 tablet by mouth nightly 90 tablet 1    ibuprofen (ADVIL;MOTRIN) 800 MG tablet Take 1 tablet by mouth 2 times daily as needed for Pain 60 tablet 0    fluticasone (FLONASE) 50 MCG/ACT nasal spray PLACE 1 OR 2 SPRAYS INTO EACH NOSTRIL ONCE DAILY AS DIRECTED 16 g 2    losartan (COZAAR) 100 MG tablet TAKE ONE TABLET BY MOUTH EVERY DAY 90 tablet 1    atorvastatin (LIPITOR) 10 MG tablet TAKE ONE TABLET BY MOUTH EVERY DAY 90 tablet 1    famotidine (PEPCID) 40 MG tablet Take 1 tablet by mouth every evening 90 tablet 1    loratadine (CLARITIN) 10 MG tablet Take 1 tablet by mouth daily 90 tablet 1    Cholecalciferol (VITAMIN D3) 50 MCG (2000 UT) CAPS Take one tab po daily 90 capsule 1    EPINEPHrine (EPIPEN 2-EVANGELISTA) 0.3 MG/0.3ML SOAJ injection Inject 0.3 mLs into the muscle as needed (bee) Use as directed for allergic reaction 1 each 1    aspirin EC 81 MG EC tablet Take 1 tablet by mouth daily 90 tablet 1    calcium carbonate (CALCIUM 600) 600 MG TABS tablet Take 1 tablet by mouth daily 90 tablet 1     MG capsule TAKE ONE CAPSULE BY MOUTH ONCE DAILY AS NEEDED FOR CONSTIPATION 30 capsule 0    clotrimazole-betamethasone (LOTRISONE) 1-0.05 % cream Apply topically 2 times daily. 1 Tube 0    triamcinolone (KENALOG) 0.1 % cream Apply topically 2 times daily. 45 g 0    desonide (DESOWEN) 0.05 % cream Apply to affected area on face and neck twice daily, 5 days on 2 days off, as needed.  fluocinonide (LIDEX) 0.05 % cream Apply to affected area twice daily, 5 days on 2 days off, as needed. Not for face, armpits, or groin.       NITROGLYCERIN SL Place under the tongue as needed       Psyllium (METAMUCIL FIBER PO) Take by mouth      amphetamine-dextroamphetamine (ADDERALL) 30 MG tablet Take 30 mg by mouth daily. Theador Tom lisdexamfetamine (VYVANSE) 70 MG capsule Take 1 capsule by mouth daily.  sildenafil (REVATIO) 20 MG tablet Take 2 or 3 tabs daily 1 hour (range: 30 minutes to 4 hours) before sexual activity as needed. Maximum 100 mg daily 60 tablet 0    mineral oil-hydrophilic petrolatum (AQUAPHOR) ointment Apply topically as needed. 396 g 3     No current facility-administered medications for this visit. Allergies   Allergen Reactions    Bee Venom     Dye [Iodides] Other (See Comments)     Patient reports father had an anaphylactic reaction to iodide which result in death    Iodine Solution [Povidone Iodine] Rash     TOPICAL AND INJECTED    Sulfa Antibiotics Rash    Topical Sulfur Rash       Health Maintenance Due   Topic Date Due    Shingles Vaccine (1 of 2) 07/18/2012    Lipid screen  04/18/2020    Potassium monitoring  04/18/2020    Creatinine monitoring  04/18/2020           REVIEW OF SYSTEMS  Review of Systems   Constitutional: Negative for chills, fatigue and fever. HENT: Negative for congestion, ear pain, postnasal drip, rhinorrhea and sore throat. Eyes: Negative for pain and itching. Respiratory: Negative for cough, chest tightness and shortness of breath. Cardiovascular: Negative for chest pain and leg swelling. Gastrointestinal: Negative for abdominal pain, constipation, diarrhea and nausea. Endocrine: Negative for heat intolerance. Genitourinary: Negative for flank pain, frequency and urgency. Musculoskeletal: Negative for arthralgias and back pain. Skin: Negative for pallor and rash. Allergic/Immunologic: Negative for environmental allergies. Neurological: Negative for dizziness, numbness and headaches. Hematological: Does not bruise/bleed easily. Psychiatric/Behavioral: Negative for agitation, behavioral problems and hallucinations.        PHYSICAL EXAM  BP (!) 162/94   Pulse 85   Temp 96.9 °F (36.1 °C)   Resp 20   Ht 5' 11\" (1.803 m)   Wt 243 lb (110.2 kg)   SpO2 98%   BMI 33.89 kg/m²   Physical Exam  Vitals signs and nursing note reviewed. Constitutional:       General: He is not in acute distress. Appearance: He is well-developed. He is not diaphoretic. HENT:      Head: Normocephalic and atraumatic. Right Ear: External ear normal.      Left Ear: External ear normal.      Nose: Nose normal.      Mouth/Throat:      Pharynx: No oropharyngeal exudate. Eyes:      General:         Right eye: No discharge. Left eye: No discharge. Conjunctiva/sclera: Conjunctivae normal.   Neck:      Musculoskeletal: Normal range of motion and neck supple. Cardiovascular:      Rate and Rhythm: Normal rate and regular rhythm. Heart sounds: Normal heart sounds. No murmur. No friction rub. No gallop. Pulmonary:      Effort: Pulmonary effort is normal. No respiratory distress. Breath sounds: Normal breath sounds. No wheezing. Abdominal:      Palpations: Abdomen is soft. Tenderness: There is no abdominal tenderness. Musculoskeletal: Normal range of motion. General: No tenderness. Skin:     General: Skin is warm and dry. Findings: No erythema or rash. Neurological:      Mental Status: He is alert and oriented to person, place, and time. Coordination: Coordination normal.      Deep Tendon Reflexes: Reflexes are normal and symmetric. Psychiatric:         Behavior: Behavior normal.         Thought Content: Thought content normal.         Judgment: Judgment normal.              Laboratory:   All laboratory and radiology results have been personally reviewed by myself    Lab Results   Component Value Date     04/18/2019    K 4.0 04/18/2019    K 4.2 03/26/2018     04/18/2019    CO2 26 04/18/2019    BUN 12 04/18/2019    CREATININE 1.0 04/18/2019    PROT 7.0 04/18/2019    LABALBU 3.9 04/18/2019    LABALBU 4.4 08/01/2011    CALCIUM 8.9 04/18/2019    GFRAA >60 04/18/2019    LABGLOM >60 04/18/2019    GLUCOSE 96 04/18/2019    GLUCOSE 59 08/01/2011    AST 30 04/18/2019    ALT 31 04/18/2019    ALKPHOS 76 04/18/2019    BILITOT 0.5 04/18/2019    TSH 2.640 04/18/2019    VITD25 46 04/18/2019    CHOL 210 04/18/2019    TRIG 207 04/18/2019    HDL 37 04/18/2019    LDLCALC 132 04/18/2019    LABA1C 5.3 10/17/2017        Lab Results   Component Value Date    CHOL 210 (H) 04/18/2019    CHOL 152 08/27/2018    CHOL 136 09/11/2017     Lab Results   Component Value Date    TRIG 207 (H) 04/18/2019    TRIG 156 (H) 08/27/2018    TRIG 203 (H) 09/11/2017     Lab Results   Component Value Date    HDL 37 04/18/2019    HDL 42 08/27/2018    HDL 43 09/11/2017     Lab Results   Component Value Date    LDLCALC 132 (H) 04/18/2019    LDLCALC 79 08/27/2018    LDLCALC 52 09/11/2017       Lab Results   Component Value Date    LABA1C 5.3 10/17/2017    LABA1C 5.0 02/08/2011     Lab Results   Component Value Date    LDLCALC 132 (H) 04/18/2019    CREATININE 1.0 04/18/2019       ASSESSMENT/PLAN:     Diagnosis Orders   1. Essential hypertension  COMPREHENSIVE METABOLIC PANEL    metoprolol succinate (TOPROL XL) 100 MG extended release tablet   2. Mixed hyperlipidemia  Lipid Panel   3. Prostate cancer metastatic to pelvis ([facility] [address]     4. Need for influenza vaccination  INFLUENZA, QUADV, 3 YRS AND OLDER, IM PF, PREFILL SYR OR SDV, 0.5ML (AFLURIA QUADV, PF)   5. Need for pneumococcal vaccination     6. Medication refill  ketorolac (ACULAR) 0.5 % ophthalmic solution     BP elevated will increase metoprolol 200 mg. Side effects reviewed patient. Labs to be completed at his convenience. We will continue all other medications at this time continue to follow with urology as directed. Flu shot and pneumonia shot updated today.     Problem list reviewed andsimplified/updated  HM reviewed today and counseled as appropriate    Call or go to ED immediately if symptoms worsen or persist.  Future Appointments   Date Time Provider Sanjuanita Alexanderi   11/18/2020  2:30 PM [facility]     Or sooner if necessary. Educational materials and/or homeexercises printed for patient's review and were included in patient instructions on his/her After Visit Summary and given to patient at the end of visit.       Counseled regarding above diagnosis, including possible risks and complications,  especially if left uncontrolled.     Counseled regarding the possible side effects, risks, benefits and alternatives to treatment; patient and/or guardian verbalizes understanding, agrees,feels comfortable with and wishes to proceed with above treatment plan.     Advised patient to call [facility] new medication issues, and read all Rx info from pharmacy to assure aware of all possible risks and side effects of medication before taking.     Reviewed age and gender appropriate health screening exams and vaccinations. Advised patient regarding importance of keeping up with recommended health maintenance and toschedule as soon as possible if overdue, as this is important in assessing for undiagnosed pathology, especially cancer, as well as protecting against potentially harmful/life threatening disease.          Patient and/or guardian verbalizes understanding and agrees with above counseling, assessment and plan.     All questions answered. René Christian DO  10/15/20    NOTE: This report was transcribed using voice recognition software.  Every effort was made to ensure accuracy; however, inadvertent computerized transcription errors may be present

## 2020-11-18 ENCOUNTER — OFFICE VISIT (OUTPATIENT)
Dept: FAMILY MEDICINE CLINIC | Age: 60
End: 2020-11-18
Payer: COMMERCIAL

## 2020-11-18 VITALS
HEIGHT: 71 IN | DIASTOLIC BLOOD PRESSURE: 92 MMHG | BODY MASS INDEX: 35.84 KG/M2 | WEIGHT: 256 LBS | RESPIRATION RATE: 20 BRPM | TEMPERATURE: 97 F | HEART RATE: 102 BPM | SYSTOLIC BLOOD PRESSURE: 158 MMHG | OXYGEN SATURATION: 98 %

## 2020-11-18 PROCEDURE — 3017F COLORECTAL CA SCREEN DOC REV: CPT | Performed by: FAMILY MEDICINE

## 2020-11-18 PROCEDURE — G8427 DOCREV CUR MEDS BY ELIG CLIN: HCPCS | Performed by: FAMILY MEDICINE

## 2020-11-18 PROCEDURE — 1036F TOBACCO NON-USER: CPT | Performed by: FAMILY MEDICINE

## 2020-11-18 PROCEDURE — 99214 OFFICE O/P EST MOD 30 MIN: CPT | Performed by: FAMILY MEDICINE

## 2020-11-18 PROCEDURE — G8417 CALC BMI ABV UP PARAM F/U: HCPCS | Performed by: FAMILY MEDICINE

## 2020-11-18 PROCEDURE — G8482 FLU IMMUNIZE ORDER/ADMIN: HCPCS | Performed by: FAMILY MEDICINE

## 2020-11-18 RX ORDER — LEUPROLIDE ACETATE 30 MG
KIT SUBCUTANEOUS
COMMUNITY

## 2020-11-18 RX ORDER — IBUPROFEN 800 MG/1
800 TABLET ORAL EVERY 8 HOURS PRN
Qty: 90 TABLET | Refills: 3 | Status: SHIPPED
Start: 2020-11-18 | End: 2021-12-07 | Stop reason: SDUPTHER

## 2020-11-18 RX ORDER — METOPROLOL SUCCINATE 50 MG/1
75 TABLET, EXTENDED RELEASE ORAL NIGHTLY
Qty: 45 TABLET | Refills: 3 | Status: SHIPPED
Start: 2020-11-18 | End: 2021-01-20 | Stop reason: SDUPTHER

## 2020-11-18 ASSESSMENT — ENCOUNTER SYMPTOMS
EYE ITCHING: 0
SORE THROAT: 0
COUGH: 0
CHEST TIGHTNESS: 0
SHORTNESS OF BREATH: 0
NAUSEA: 0
ROS SKIN COMMENTS: DRY SKIN
EYE PAIN: 0
CONSTIPATION: 0
DIARRHEA: 0
ABDOMINAL PAIN: 0
RHINORRHEA: 0
BACK PAIN: 0

## 2020-11-18 NOTE — PROGRESS NOTES
Pavithra Wyman  : 1960    Chief Complaint:     Chief Complaint   Patient presents with    Hypertension    Knee Pain     left knee pain       HPI  Pavithra Wyman 64 y.o. presents for   Chief Complaint   Patient presents with    Hypertension    Knee Pain     left knee pain     Patient states that his blood pressure continues to be slightly high. He states he cannot tolerate the 100 mg of the metoprolol. However he did cut the 100s in quarters and he did tolerate 75 mg. He wishes to try this medication. He also has been having issues with his left knee. He does follow with an orthopedic and is getting an MRI on his knee as they think this may be a meniscus injury. He did have some issues with his right eye. He was diagnosed with a stye and was placed on antibiotic ointment. He is having some dry skin around his eye and has been using Aquaphor    All questions were answered to patients satisfaction. Past Medical History:   Diagnosis Date    Acute drug-induced gout of left foot 2015    Had gout attack on 7/18/15. Seems to be HCTZ induced.       ADHD (attention deficit hyperactivity disorder)     Back pain, chronic     Depression     Hyperlipidemia     Hypertension     Left elbow fracture 10/27/2015    MRSA (methicillin resistant staph aureus) culture positive     Seasonal allergies        Past Surgical History:   Procedure Laterality Date    APPENDECTOMY  1972    COLECTOMY Bilateral 2019    COLONOSCOPY  2013    sigmoid diverticulosis, Dr. Kristi Zapata, [address] [place] [address] COLONOSCOPY  2013    ENDOSCOPIC ULTRASOUND (LOWER) Bilateral 2019    upper    FOOT SURGERY  1990s    RIGHT DUE TO FX    LUMBAR DISCECTOMY      [facility]     OTHER SURGICAL HISTORY  2018    removal and replacement of spinal cord stimulator    MT IMPLANT NEUROSTIM/ N/A 3/28/2018    REMOVAL OF T7 - T8 MALFUCTIONING  STIMULATOR, REPLACEMENT OF T7- T8 SPINAL CORD STIMULATOR --[facility]URILLO TABLE, [employer] performed by Favian Lau MD at [place] [address]  2009    insertion of spinal cord stimulator and since then has had 8 different surgeries to take it out and put it in due to MRSA infections    SPINE SURGERY  10/11/2011    INSERTION OF SPINAL CORD STIMULATOR    SPINE SURGERY  6/2012    [facility]     Socioeconomic History    Marital status:      Spouse name: None    Number of children: None    Years of education: None    Highest education level: None   Occupational History    Occupation: retired    Social Needs    Financial resource strain: None    Food insecurity     Worry: None     Inability: None    Transportation needs     Medical: None     Non-medical: None   Tobacco Use    Smoking status: Never Smoker    Smokeless tobacco: Never Used   Substance and Sexual Activity    Alcohol use: Yes     Comment: rare    Drug use: Yes     Comment: used to do cocaine 2013    Sexual activity: Not Currently   Lifestyle    Physical activity     Days per week: None     Minutes per session: None    Stress: None   Relationships    Social connections     Talks on phone: None     Gets together: None     Attends [employer] service: None     Active member of club or organization: None     Attends meetings of clubs or organizations: None     Relationship status: None    Intimate partner violence     Fear of current or ex partner: None     Emotionally abused: None     Physically abused: None     Forced sexual activity: None   Other Topics Concern    None   Social History Narrative    None       Family History   Problem Relation Age of Onset    Heart Failure Mother         CABG at 61     Heart Disease Mother     High Blood Pressure Mother     Asthma Mother     Heart Failure Father         CABG at 63's    Hypertension Father     Diabetes Father     Heart Disease Father     High Blood Pressure Father           Current Outpatient Medications   Medication Sig Dispense Refill    leuprolide acetate, 4 Month, (ELIGARD) 30 MG injection Inject into the skin every 6 months      ibuprofen (ADVIL;MOTRIN) 800 MG tablet Take 1 tablet by mouth every 8 hours as needed for Pain 90 tablet 3    metoprolol succinate (TOPROL XL) 50 MG extended release tablet Take 1.5 tablets by mouth nightly 45 tablet 3    XTANDI 40 MG capsule       tamsulosin (FLOMAX) 0.4 MG capsule       Probiotic Product (ADVANCED PROBIOTIC 10) CAPS Take 1 capsule by mouth daily 90 capsule 1    ketorolac (ACULAR) 0.5 % ophthalmic solution Place 1 drop into both eyes 4 times daily 1 Bottle 0    fluticasone (FLONASE) 50 MCG/ACT nasal spray PLACE 1 OR 2 SPRAYS INTO EACH NOSTRIL ONCE DAILY AS DIRECTED 16 g 2    losartan (COZAAR) 100 MG tablet TAKE ONE TABLET BY MOUTH EVERY DAY 90 tablet 1    atorvastatin (LIPITOR) 10 MG tablet TAKE ONE TABLET BY MOUTH EVERY DAY 90 tablet 1    famotidine (PEPCID) 40 MG tablet Take 1 tablet by mouth every evening 90 tablet 1    loratadine (CLARITIN) 10 MG tablet Take 1 tablet by mouth daily 90 tablet 1    Cholecalciferol (VITAMIN D3) 50 MCG (2000 UT) CAPS Take one tab po daily 90 capsule 1    EPINEPHrine (EPIPEN 2-EVANGELISTA) 0.3 MG/0.3ML SOAJ injection Inject 0.3 mLs into the muscle as needed (bee) Use as directed for allergic reaction 1 each 1    aspirin EC 81 MG EC tablet Take 1 tablet by mouth daily 90 tablet 1    calcium carbonate (CALCIUM 600) 600 MG TABS tablet Take 1 tablet by mouth daily 90 tablet 1     MG capsule TAKE ONE CAPSULE BY MOUTH ONCE DAILY AS NEEDED FOR CONSTIPATION 30 capsule 0    clotrimazole-betamethasone (LOTRISONE) 1-0.05 % cream Apply topically 2 times daily. 1 Tube 0    triamcinolone (KENALOG) 0.1 % cream Apply topically 2 times daily. 45 g 0    desonide (DESOWEN) 0.05 % cream Apply to affected area on face and neck twice daily, 5 days on 2 days off, as needed.  fluocinonide (LIDEX) 0.05 % cream Apply to affected area twice daily, 5 days on 2 days off, as needed. Not for face, armpits, or groin.  Psyllium (METAMUCIL FIBER PO) Take by mouth      amphetamine-dextroamphetamine (ADDERALL) 30 MG tablet Take 30 mg by mouth daily. Illene Soles lisdexamfetamine (VYVANSE) 70 MG capsule Take 1 capsule by mouth daily.  sildenafil (REVATIO) 20 MG tablet Take 2 or 3 tabs daily 1 hour (range: 30 minutes to 4 hours) before sexual activity as needed. Maximum 100 mg daily 60 tablet 0    mineral oil-hydrophilic petrolatum (AQUAPHOR) ointment Apply topically as needed. 396 g 3     No current facility-administered medications for this visit. Allergies   Allergen Reactions    Bee Venom     Dye [Iodides] Other (See Comments)     Patient reports father had an anaphylactic reaction to iodide which result in death    Iodine Solution [Povidone Iodine] Rash     TOPICAL AND INJECTED    Sulfa Antibiotics Rash    Topical Sulfur Rash       Health Maintenance Due   Topic Date Due    Shingles Vaccine (1 of 2) 07/18/2012    Lipid screen  04/18/2020    Potassium monitoring  04/18/2020    Creatinine monitoring  04/18/2020           REVIEW OF SYSTEMS  Review of Systems   Constitutional: Negative for chills, fatigue and fever. HENT: Negative for congestion, ear pain, postnasal drip, rhinorrhea and sore throat. Eyes: Negative for pain and itching. Respiratory: Negative for cough, chest tightness and shortness of breath. Cardiovascular: Negative for chest pain and leg swelling. Gastrointestinal: Negative for abdominal pain, constipation, diarrhea and nausea. Endocrine: Negative for heat intolerance. Genitourinary: Negative for flank pain, frequency and urgency. Musculoskeletal: Negative for arthralgias and back pain. Skin: Negative for pallor and rash. Dry skin   Allergic/Immunologic: Negative for environmental allergies.    Neurological: Negative for dizziness, numbness and headaches. Hematological: Does not bruise/bleed easily. Psychiatric/Behavioral: Negative for agitation, behavioral problems and hallucinations. PHYSICAL EXAM  BP (!) 158/92   Pulse 102   Temp 97 °F (36.1 °C)   Resp 20   Ht 5' 11\" (1.803 m)   Wt 256 lb (116.1 kg)   SpO2 98%   BMI 35.70 kg/m²   Physical Exam  Vitals signs and nursing note reviewed. Constitutional:       General: He is not in acute distress. Appearance: He is well-developed. He is not diaphoretic. HENT:      Head: Normocephalic and atraumatic. Right Ear: External ear normal.      Left Ear: External ear normal.      Nose: Nose normal.      Mouth/Throat:      Pharynx: No oropharyngeal exudate. Eyes:      General:         Right eye: No discharge. Left eye: No discharge. Conjunctiva/sclera: Conjunctivae normal.      Comments: Slightly dry skin around the eyes no stye noted in the right eye   Neck:      Musculoskeletal: Normal range of motion and neck supple. Cardiovascular:      Rate and Rhythm: Normal rate and regular rhythm. Heart sounds: Normal heart sounds. No murmur. No friction rub. No gallop. Pulmonary:      Effort: Pulmonary effort is normal. No respiratory distress. Breath sounds: Normal breath sounds. No wheezing. Abdominal:      Palpations: Abdomen is soft. Tenderness: There is no abdominal tenderness. Musculoskeletal: Normal range of motion. General: No tenderness. Skin:     General: Skin is warm and dry. Findings: No erythema or rash. Neurological:      Mental Status: He is alert and oriented to person, place, and time. Coordination: Coordination normal.      Deep Tendon Reflexes: Reflexes are normal and symmetric. Psychiatric:         Behavior: Behavior normal.         Thought Content: Thought content normal.         Judgment: Judgment normal.              Laboratory:   All laboratory and radiology results have been personally reviewed by myself    Lab Results   Component Value Date     04/18/2019    K 4.0 04/18/2019    K 4.2 03/26/2018     04/18/2019    CO2 26 04/18/2019    BUN 12 04/18/2019    CREATININE 1.0 04/18/2019    PROT 7.0 04/18/2019    LABALBU 3.9 04/18/2019    LABALBU 4.4 08/01/2011    CALCIUM 8.9 04/18/2019    GFRAA >60 04/18/2019    LABGLOM >60 04/18/2019    GLUCOSE 96 04/18/2019    GLUCOSE 59 08/01/2011    AST 30 04/18/2019    ALT 31 04/18/2019    ALKPHOS 76 04/18/2019    BILITOT 0.5 04/18/2019    TSH 2.640 04/18/2019    VITD25 46 04/18/2019    CHOL 210 04/18/2019    TRIG 207 04/18/2019    HDL 37 04/18/2019    LDLCALC 132 04/18/2019    LABA1C 5.3 10/17/2017        Lab Results   Component Value Date    CHOL 210 (H) 04/18/2019    CHOL 152 08/27/2018    CHOL 136 09/11/2017     Lab Results   Component Value Date    TRIG 207 (H) 04/18/2019    TRIG 156 (H) 08/27/2018    TRIG 203 (H) 09/11/2017     Lab Results   Component Value Date    HDL 37 04/18/2019    HDL 42 08/27/2018    HDL 43 09/11/2017     Lab Results   Component Value Date    LDLCALC 132 (H) 04/18/2019    LDLCALC 79 08/27/2018    LDLCALC 52 09/11/2017       Lab Results   Component Value Date    LABA1C 5.3 10/17/2017    LABA1C 5.0 02/08/2011     Lab Results   Component Value Date    LDLCALC 132 (H) 04/18/2019    CREATININE 1.0 04/18/2019       ASSESSMENT/PLAN:     Diagnosis Orders   1. Essential hypertension  metoprolol succinate (TOPROL XL) 50 MG extended release tablet   2. Mixed hyperlipidemia     3. Left knee pain, unspecified chronicity     4. Eczema, unspecified type       Will try 75 mg of metoprolol. Side effects reviewed with patient. As for the left knee continue to follow with orthopedics as directed. Continue to use Aquaphor around the eyes as needed for dry skin. Continue all other medications at this time.      Problem list reviewed andsimplified/updated  HM reviewed today and counseled as appropriate    Call or go to ED immediately if symptoms worsen or persist.  Future Appointments   Date Time Provider Sanjuanita Alexanderi   1/20/2021  1:30 PM DO Seth Clemensmiguel ángel [facility]     Or sooner if necessary. Educational materials and/or homeexercises printed for patient's review and were included in patient instructions on his/her After Visit Summary and given to patient at the end of visit.       Counseled regarding above diagnosis, including possible risks and complications,  especially if left uncontrolled.     Counseled regarding the possible side effects, risks, benefits and alternatives to treatment; patient and/or guardian verbalizes understanding, agrees,feels comfortable with and wishes to proceed with above treatment plan.     Advised patient to call [facility]re[facility] new medication issues, and read all Rx info from pharmacy to assure aware of all possible risks and side effects of medication before taking.     Reviewed age and gender appropriate health screening exams and vaccinations. Advised patient regarding importance of keeping up with recommended health maintenance and toschedule as soon as possible if overdue, as this is important in assessing for undiagnosed pathology, especially cancer, as well as protecting against potentially harmful/life threatening disease.          Patient and/or guardian verbalizes understanding and agrees with above counseling, assessment and plan.     All questions answered. Mohit Tiwari DO  11/18/20    NOTE: This report was transcribed using voice recognition software.  Every effort was made to ensure accuracy; however, inadvertent computerized transcription errors may be present

## 2020-12-02 ENCOUNTER — VIRTUAL VISIT (OUTPATIENT)
Dept: FAMILY MEDICINE CLINIC | Age: 60
End: 2020-12-02
Payer: COMMERCIAL

## 2020-12-02 PROCEDURE — G8427 DOCREV CUR MEDS BY ELIG CLIN: HCPCS | Performed by: FAMILY MEDICINE

## 2020-12-02 PROCEDURE — 99213 OFFICE O/P EST LOW 20 MIN: CPT | Performed by: FAMILY MEDICINE

## 2020-12-02 PROCEDURE — 3017F COLORECTAL CA SCREEN DOC REV: CPT | Performed by: FAMILY MEDICINE

## 2020-12-02 RX ORDER — PREDNISONE 10 MG/1
TABLET ORAL
Qty: 30 TABLET | Refills: 0 | Status: SHIPPED | OUTPATIENT
Start: 2020-12-02 | End: 2020-12-12

## 2020-12-02 RX ORDER — HYDROXYZINE HYDROCHLORIDE 25 MG/1
25 TABLET, FILM COATED ORAL EVERY 8 HOURS PRN
Qty: 30 TABLET | Refills: 0 | Status: SHIPPED | OUTPATIENT
Start: 2020-12-02 | End: 2020-12-12

## 2020-12-02 NOTE — PROGRESS NOTES
This visit was performed as a virtual video visit using a synchronous, two-way, audio-video telehealth technology platform. Patient identification was verified at the start of the visit, including the patient's telephone number and physical location. I discussed with the patient the nature of our telehealth visits, that:     1. Due to the nature of an audio- video modality, the only components of a physical exam that could be done are the elements supported by direct observation. 2. I would evaluate the patient and recommend diagnostics and treatments based on my assessment. 3. If it was felt that the patient should be evaluated in clinic or an emergency room setting, then they would be directed there. 4. Our sessions are not being recorded and that personal health information is protected. 5. Our team would provide follow up care in person if/when the patient needs it. Patient does agree to proceed with telemedicine consultation. Patient's location: home address in [facility]    Physician  location [place] [address] office   other people involved in call: none        Time spent: Greater than Not billed by time    This visit was completed virtually using Doxy. me        Muriel Prasad  : 1960    Chief Complaint:     Chief Complaint   Patient presents with   [facility]  Muriel Prasad 64 y.o. presents for   Chief Complaint   Patient presents with   [facility]     Patient presents for possible poison ivy. He states that he has had issues prior with this. He has been allergic prior. He was working outside with no gloves. He has rash on his hands and fingers, face and neck. He denies any shortness of breath or difficulty swallowing. All questions were answered to patients satisfaction. Past Medical History:   Diagnosis Date    Acute drug-induced gout of left foot 2015    Had gout attack on 7/18/15. Seems to be HCTZ induced.       ADHD (attention deficit hyperactivity disorder)     Back pain, chronic     Depression     Hyperlipidemia     Hypertension     Left elbow fracture 10/27/2015    MRSA (methicillin resistant staph aureus) culture positive     Seasonal allergies        Past Surgical History:   Procedure Laterality Date    APPENDECTOMY  1972    COLECTOMY Bilateral 02/2019    COLONOSCOPY  2/1/2013    sigmoid diverticulosis, Dr. Percy Khan, [address] [place] [address] COLONOSCOPY  02/01/2013    ENDOSCOPIC ULTRASOUND (LOWER) Bilateral 02/2019    upper    FOOT SURGERY  1990s    RIGHT DUE TO FX    LUMBAR DISCECTOMY  2007    [facility]     OTHER SURGICAL HISTORY  03/28/2018    removal and replacement of spinal cord stimulator    AK IMPLANT NEUROSTIM/ N/A 3/28/2018    REMOVAL OF T7 - T8 MALFUCTIONING  STIMULATOR, REPLACEMENT OF T7- T8 SPINAL CORD STIMULATOR --LIDIA, CASTILLO TABLE, [employer] performed by Sabi Tamayo MD at [place] [address]  2009    insertion of spinal cord stimulator and since then has had 8 different surgeries to take it out and put it in due to MRSA infections    SPINE SURGERY  10/11/2011    INSERTION OF SPINAL CORD STIMULATOR    SPINE SURGERY  6/2012    [facility] History     Socioeconomic History    Marital status:      Spouse name: Not on file    Number of children: Not on file    Years of education: Not on file    Highest education level: Not on file   Occupational History    Occupation: retired    Social Needs    Financial resource strain: Not on file    Food insecurity     Worry: Not on file     Inability: Not on file   [employer] needs     Medical: Not on file     Non-medical: Not on file   Tobacco Use    Smoking status: Never Smoker    Smokeless tobacco: Never Used   Substance and Sexual Activity    Alcohol use: Yes     Comment: rare    Drug use: Yes     Comment: used to do cocaine 2013    Sexual activity: Not Currently   Lifestyle    Physical activity Days per week: Not on file     Minutes per session: Not on file    Stress: Not on file   Relationships    Social connections     Talks on phone: Not on file     Gets together: Not on file     Attends Congregation service: Not on file     Active member of club or organization: Not on file     Attends meetings of clubs or organizations: Not on file     Relationship status: Not on file    Intimate partner violence     Fear of current or ex partner: Not on file     Emotionally abused: Not on file     Physically abused: Not on file     Forced sexual activity: Not on file   Other Topics Concern    Not on file   Social History Narrative    Not on file       Family History   Problem Relation Age of Onset    Heart Failure Mother         CABG at 61     Heart Disease Mother     High Blood Pressure Mother     Asthma Mother     Heart Failure Father         CABG at 63's    Hypertension Father     Diabetes Father     Heart Disease Father     High Blood Pressure Father           Current Outpatient Medications   Medication Sig Dispense Refill    predniSONE (DELTASONE) 10 MG tablet 5 tabs po x 2 days, then 4 tabs po x 2 days, then 3 tabs x 2 days, then 2 tabs x 2 days, then 1 tab x 2 days then stop.  30 tablet 0    hydrOXYzine (ATARAX) 25 MG tablet Take 1 tablet by mouth every 8 hours as needed for Itching 30 tablet 0    leuprolide acetate, 4 Month, (ELIGARD) 30 MG injection Inject into the skin every 6 months      ibuprofen (ADVIL;MOTRIN) 800 MG tablet Take 1 tablet by mouth every 8 hours as needed for Pain 90 tablet 3    metoprolol succinate (TOPROL XL) 50 MG extended release tablet Take 1.5 tablets by mouth nightly 45 tablet 3    XTANDI 40 MG capsule       tamsulosin (FLOMAX) 0.4 MG capsule       Probiotic Product (ADVANCED PROBIOTIC 10) CAPS Take 1 capsule by mouth daily 90 capsule 1    ketorolac (ACULAR) 0.5 % ophthalmic solution Place 1 drop into both eyes 4 times daily 1 Bottle 0    fluticasone (FLONASE) 50 MCG/ACT nasal spray PLACE 1 OR 2 SPRAYS INTO EACH NOSTRIL ONCE DAILY AS DIRECTED 16 g 2    losartan (COZAAR) 100 MG tablet TAKE ONE TABLET BY MOUTH EVERY DAY 90 tablet 1    atorvastatin (LIPITOR) 10 MG tablet TAKE ONE TABLET BY MOUTH EVERY DAY 90 tablet 1    famotidine (PEPCID) 40 MG tablet Take 1 tablet by mouth every evening 90 tablet 1    loratadine (CLARITIN) 10 MG tablet Take 1 tablet by mouth daily 90 tablet 1    Cholecalciferol (VITAMIN D3) 50 MCG (2000 UT) CAPS Take one tab po daily 90 capsule 1    EPINEPHrine (EPIPEN 2-EVANGELISTA) 0.3 MG/0.3ML SOAJ injection Inject 0.3 mLs into the muscle as needed (bee) Use as directed for allergic reaction 1 each 1    aspirin EC 81 MG EC tablet Take 1 tablet by mouth daily 90 tablet 1    calcium carbonate (CALCIUM 600) 600 MG TABS tablet Take 1 tablet by mouth daily 90 tablet 1     MG capsule TAKE ONE CAPSULE BY MOUTH ONCE DAILY AS NEEDED FOR CONSTIPATION 30 capsule 0    clotrimazole-betamethasone (LOTRISONE) 1-0.05 % cream Apply topically 2 times daily. 1 Tube 0    triamcinolone (KENALOG) 0.1 % cream Apply topically 2 times daily. 45 g 0    desonide (DESOWEN) 0.05 % cream Apply to affected area on face and neck twice daily, 5 days on 2 days off, as needed.  fluocinonide (LIDEX) 0.05 % cream Apply to affected area twice daily, 5 days on 2 days off, as needed. Not for face, armpits, or groin.  Psyllium (METAMUCIL FIBER PO) Take by mouth      amphetamine-dextroamphetamine (ADDERALL) 30 MG tablet Take 30 mg by mouth daily. Quintella Dun lisdexamfetamine (VYVANSE) 70 MG capsule Take 1 capsule by mouth daily.  sildenafil (REVATIO) 20 MG tablet Take 2 or 3 tabs daily 1 hour (range: 30 minutes to 4 hours) before sexual activity as needed. Maximum 100 mg daily 60 tablet 0    mineral oil-hydrophilic petrolatum (AQUAPHOR) ointment Apply topically as needed. 396 g 3     No current facility-administered medications for this visit.         Allergies   Allergen Reactions    Bee Venom     Dye [Iodides] Other (See Comments)     Patient reports father had an anaphylactic reaction to iodide which result in death    Iodine Solution [Povidone Iodine] Rash     TOPICAL AND INJECTED    Sulfa Antibiotics Rash    Topical Sulfur Rash       Health Maintenance Due   Topic Date Due    Shingles Vaccine (1 of 2) 07/18/2012    Lipid screen  04/18/2020    Potassium monitoring  04/18/2020    Creatinine monitoring  04/18/2020           REVIEW OF SYSTEMS  Review of Systems   Constitutional: Negative for chills, fatigue and fever. HENT: Negative for congestion, ear pain, postnasal drip, rhinorrhea and sore throat. Eyes: Negative for pain and itching. Respiratory: Negative for cough, chest tightness and shortness of breath. Cardiovascular: Negative for chest pain and leg swelling. Gastrointestinal: Negative for abdominal pain, constipation, diarrhea and nausea. Endocrine: Negative for heat intolerance. Genitourinary: Negative for flank pain, frequency and urgency. Musculoskeletal: Negative for arthralgias and back pain. Skin: Positive for rash. Negative for pallor. Allergic/Immunologic: Negative for environmental allergies. Neurological: Negative for dizziness, numbness and headaches. Hematological: Does not bruise/bleed easily. Psychiatric/Behavioral: Negative for agitation, behavioral problems and hallucinations. PHYSICAL EXAM  There were no vitals taken for this visit. Physical Exam  Vitals signs reviewed. Constitutional:       Appearance: Normal appearance. He is normal weight. HENT:      Head: Normocephalic. Nose: Nose normal.   Eyes:      Extraocular Movements: Extraocular movements intact. Conjunctiva/sclera: Conjunctivae normal.   Neck:      Musculoskeletal: Normal range of motion. Pulmonary:      Effort: Pulmonary effort is normal.   Musculoskeletal: Normal range of motion.    Skin:     Findings: Rash (erythematous and vesicular rash located on fingers and face) present. Neurological:      General: No focal deficit present. Mental Status: He is alert and oriented to person, place, and time. Mental status is at baseline. Psychiatric:         Mood and Affect: Mood normal.         Behavior: Behavior normal.         Thought Content: Thought content normal.         Judgment: Judgment normal.              Laboratory: All laboratory and radiology results have been personally reviewed by myself    Lab Results   Component Value Date     04/18/2019    K 4.0 04/18/2019    K 4.2 03/26/2018     04/18/2019    CO2 26 04/18/2019    BUN 12 04/18/2019    CREATININE 1.0 04/18/2019    PROT 7.0 04/18/2019    LABALBU 3.9 04/18/2019    LABALBU 4.4 08/01/2011    CALCIUM 8.9 04/18/2019    GFRAA >60 04/18/2019    LABGLOM >60 04/18/2019    GLUCOSE 96 04/18/2019    GLUCOSE 59 08/01/2011    AST 30 04/18/2019    ALT 31 04/18/2019    ALKPHOS 76 04/18/2019    BILITOT 0.5 04/18/2019    TSH 2.640 04/18/2019    VITD25 46 04/18/2019    CHOL 210 04/18/2019    TRIG 207 04/18/2019    HDL 37 04/18/2019    LDLCALC 132 04/18/2019    LABA1C 5.3 10/17/2017        Lab Results   Component Value Date    CHOL 210 (H) 04/18/2019    CHOL 152 08/27/2018    CHOL 136 09/11/2017     Lab Results   Component Value Date    TRIG 207 (H) 04/18/2019    TRIG 156 (H) 08/27/2018    TRIG 203 (H) 09/11/2017     Lab Results   Component Value Date    HDL 37 04/18/2019    HDL 42 08/27/2018    HDL 43 09/11/2017     Lab Results   Component Value Date    LDLCALC 132 (H) 04/18/2019    LDLCALC 79 08/27/2018    LDLCALC 52 09/11/2017       Lab Results   Component Value Date    LABA1C 5.3 10/17/2017    LABA1C 5.0 02/08/2011     Lab Results   Component Value Date    LDLCALC 132 (H) 04/18/2019    CREATININE 1.0 04/18/2019       ASSESSMENT/PLAN:     Diagnosis Orders   1. Rhus dermatitis       Recommend to start prednisone as well as hydroxyzine.  Side effects of medication were reviewed with patient. If not improved recommend to return for re-evaluation    Problem list reviewed andsimplified/updated  HM reviewed today and counseled as appropriate    Call or go to ED immediately if symptoms worsen or persist.  Future Appointments   Date Time Provider Sanjuanita Dueñas   12/7/2020 12:00 PM [facility] MRI RM 1 [facility] MRI [facility] Radiolo   1/20/2021  1:30 PM DO Fito Mccauley [facility]     Or sooner if necessary. Educational materials and/or homeexercises printed for patient's review and were included in patient instructions on his/her After Visit Summary and given to patient at the end of visit. Counseled regarding above diagnosis, including possible risks and complications,  especially if left uncontrolled. Counseled regarding the possible side effects, risks, benefits and alternatives to treatment; patient and/or guardian verbalizes understanding, agrees,feels comfortable with and wishes to proceed with above treatment plan. Advised patient to call Gamal Zealya new medication issues, and read all Rx info from pharmacy to assure aware of all possible risks and side effects of medication before taking. Reviewed age and gender appropriate health screening exams and vaccinations. Advised patient regarding importance of keeping up with recommended health maintenance and toschedule as soon as possible if overdue, as this is important in assessing for undiagnosed pathology, especially cancer, as well as protecting against potentially harmful/life threatening disease. Patient and/or guardian verbalizes understanding and agrees with above counseling, assessment and plan. All questions answered. Mohit Tiwari DO  12/2/20    NOTE: This report was transcribed using voice recognition software.  Every effort was made to ensure accuracy; however, inadvertent computerized transcription errors may be present    Ayaz Lindsay is a 64 y.o. male being evaluated by a Virtual Visit (video visit) encounter to address concerns as mentioned above. A caregiver was present when appropriate. Due to this being a TeleHealth encounter (During IOWKW-03 public health emergency), evaluation of the following organ systems was limited: Vitals/Constitutional/EENT/Resp/CV/GI//MS/Neuro/Skin/Heme-Lymph-Imm. Pursuant to the emergency declaration under the [address] [place] [address] and the [employer] and [employer] Act, this Virtual Visit was conducted with patient's (and/or legal guardian's) consent, to reduce the patient's risk of exposure to COVID-19 and provide necessary medical care. The patient (and/or legal guardian) has also been advised to contact this office for worsening conditions or problems, and seek emergency medical treatment and/or call 911 if deemed necessary.

## 2020-12-04 ASSESSMENT — ENCOUNTER SYMPTOMS
ABDOMINAL PAIN: 0
NAUSEA: 0
COUGH: 0
DIARRHEA: 0
SHORTNESS OF BREATH: 0
CHEST TIGHTNESS: 0
EYE PAIN: 0
EYE ITCHING: 0
SORE THROAT: 0
BACK PAIN: 0
RHINORRHEA: 0
CONSTIPATION: 0

## 2021-01-04 ENCOUNTER — TELEPHONE (OUTPATIENT)
Dept: FAMILY MEDICINE CLINIC | Age: 61
End: 2021-01-04

## 2021-01-05 ENCOUNTER — TELEPHONE (OUTPATIENT)
Dept: FAMILY MEDICINE CLINIC | Age: 61
End: 2021-01-05

## 2021-01-05 ENCOUNTER — OFFICE VISIT (OUTPATIENT)
Dept: FAMILY MEDICINE CLINIC | Age: 61
End: 2021-01-05
Payer: COMMERCIAL

## 2021-01-05 VITALS
SYSTOLIC BLOOD PRESSURE: 150 MMHG | OXYGEN SATURATION: 99 % | HEIGHT: 71 IN | WEIGHT: 253.5 LBS | BODY MASS INDEX: 35.49 KG/M2 | TEMPERATURE: 97.6 F | RESPIRATION RATE: 20 BRPM | HEART RATE: 99 BPM | DIASTOLIC BLOOD PRESSURE: 92 MMHG

## 2021-01-05 DIAGNOSIS — C61 PROSTATE CANCER METASTATIC TO PELVIS (HCC): ICD-10-CM

## 2021-01-05 DIAGNOSIS — C79.89 PROSTATE CANCER METASTATIC TO PELVIS (HCC): ICD-10-CM

## 2021-01-05 DIAGNOSIS — L25.5 RHUS DERMATITIS: Primary | ICD-10-CM

## 2021-01-05 DIAGNOSIS — I10 ESSENTIAL HYPERTENSION: ICD-10-CM

## 2021-01-05 PROCEDURE — 99214 OFFICE O/P EST MOD 30 MIN: CPT | Performed by: FAMILY MEDICINE

## 2021-01-05 PROCEDURE — 3017F COLORECTAL CA SCREEN DOC REV: CPT | Performed by: FAMILY MEDICINE

## 2021-01-05 PROCEDURE — G8427 DOCREV CUR MEDS BY ELIG CLIN: HCPCS | Performed by: FAMILY MEDICINE

## 2021-01-05 PROCEDURE — 1036F TOBACCO NON-USER: CPT | Performed by: FAMILY MEDICINE

## 2021-01-05 PROCEDURE — G8417 CALC BMI ABV UP PARAM F/U: HCPCS | Performed by: FAMILY MEDICINE

## 2021-01-05 PROCEDURE — 96372 THER/PROPH/DIAG INJ SC/IM: CPT | Performed by: FAMILY MEDICINE

## 2021-01-05 PROCEDURE — G8482 FLU IMMUNIZE ORDER/ADMIN: HCPCS | Performed by: FAMILY MEDICINE

## 2021-01-05 RX ORDER — CLOBETASOL PROPIONATE 0.5 MG/G
OINTMENT TOPICAL
Qty: 60 G | Refills: 2 | Status: SHIPPED | OUTPATIENT
Start: 2021-01-05

## 2021-01-05 RX ORDER — TRIAMCINOLONE ACETONIDE 40 MG/ML
40 INJECTION, SUSPENSION INTRA-ARTICULAR; INTRAMUSCULAR ONCE
Status: COMPLETED | OUTPATIENT
Start: 2021-01-05 | End: 2021-01-05

## 2021-01-05 RX ADMIN — TRIAMCINOLONE ACETONIDE 40 MG: 40 INJECTION, SUSPENSION INTRA-ARTICULAR; INTRAMUSCULAR at 14:19

## 2021-01-05 NOTE — PROGRESS NOTES
Jenise Magallon  : 1960    Chief Complaint:     Chief Complaint   Patient presents with   [facility]       HPI  Jenise Magallon 64 y.o. presents for   Chief Complaint   Patient presents with   [facility]     Patient presents for possible poison ivy. He states he has some weeping sores on his bilateral hands and legs. He was treated with prednisone which somewhat helped. He states he is severely allergic to this. He states his mother was 2. His blood pressure is slightly elevated today. He has been taking all of his medications as prescribed. He does follow with urology for prostate CA    All questions were answered to patients satisfaction. Past Medical History:   Diagnosis Date    Acute drug-induced gout of left foot 2015    Had gout attack on 7/18/15. Seems to be HCTZ induced.       ADHD (attention deficit hyperactivity disorder)     Back pain, chronic     Depression     Hyperlipidemia     Hypertension     Left elbow fracture 10/27/2015    MRSA (methicillin resistant staph aureus) culture positive     Seasonal allergies        Past Surgical History:   Procedure Laterality Date    APPENDECTOMY  1972    COLECTOMY Bilateral 2019    COLONOSCOPY  2013    sigmoid diverticulosis, Dr. Juliano Shen, [address] [place] [address] COLONOSCOPY  2013    ENDOSCOPIC ULTRASOUND (LOWER) Bilateral 2019    upper    FOOT SURGERY      RIGHT DUE TO FX    LUMBAR DISCECTOMY      [facility]     OTHER SURGICAL HISTORY  2018    removal and replacement of spinal cord stimulator    [place] IMPLANT NEUROSTIM/ N/A 3/28/2018    REMOVAL OF T7 - T8 MALFUCTIONING  STIMULATOR, REPLACEMENT OF T7- T8 SPINAL CORD STIMULATOR --CASTILLO MURILLO, [employer] performed by Keiko Reyez MD at [place] [address]      insertion of spinal cord stimulator and since then has had 8 different surgeries to take it out and put it in due to MRSA infections    SPINE SURGERY  10/11/2011 INSERTION OF SPINAL CORD STIMULATOR    SPINE SURGERY  6/2012    change of spinal cord stimulator, [address] History     Socioeconomic History    Marital status:      Spouse name: None    Number of children: None    Years of education: None    Highest education level: None   Occupational History    Occupation: retired    Social Needs    Financial resource strain: None    Food insecurity     Worry: None     Inability: None    Transportation needs     Medical: None     Non-medical: None   Tobacco Use    Smoking status: Never Smoker    Smokeless tobacco: Never Used   Substance and Sexual Activity    Alcohol use: Yes     Comment: rare    Drug use: Yes     Comment: used to do cocaine 2013    Sexual activity: Not Currently   Lifestyle    Physical activity     Days per week: None     Minutes per session: None    Stress: None   Relationships    Social connections     Talks on phone: None     Gets together: None     Attends Sabianist service: None     Active member of club or organization: None     Attends meetings of clubs or organizations: None     Relationship status: None    Intimate partner violence     Fear of current or ex partner: None     Emotionally abused: None     Physically abused: None     Forced sexual activity: None   Other Topics Concern    None   Social History Narrative    None       Family History   Problem Relation Age of Onset    Heart Failure Mother         CABG at 61     Heart Disease Mother     High Blood Pressure Mother     Asthma Mother     Heart Failure Father         CABG at 63's    Hypertension Father     Diabetes Father     Heart Disease Father     High Blood Pressure Father           Current Outpatient Medications   Medication Sig Dispense Refill    clobetasol (TEMOVATE) 0.05 % ointment Apply topically 2 times daily.  60 g 2    leuprolide acetate, 4 Month, (ELIGARD) 30 MG injection Inject into the skin every 6 months      ibuprofen (ADVIL;MOTRIN) 800 MG tablet Take 1 tablet by mouth every 8 hours as needed for Pain 90 tablet 3    metoprolol succinate (TOPROL XL) 50 MG extended release tablet Take 1.5 tablets by mouth nightly 45 tablet 3    XTANDI 40 MG capsule       tamsulosin (FLOMAX) 0.4 MG capsule       Probiotic Product (ADVANCED PROBIOTIC 10) CAPS Take 1 capsule by mouth daily 90 capsule 1    ketorolac (ACULAR) 0.5 % ophthalmic solution Place 1 drop into both eyes 4 times daily 1 Bottle 0    fluticasone (FLONASE) 50 MCG/ACT nasal spray PLACE 1 OR 2 SPRAYS INTO EACH NOSTRIL ONCE DAILY AS DIRECTED 16 g 2    losartan (COZAAR) 100 MG tablet TAKE ONE TABLET BY MOUTH EVERY DAY 90 tablet 1    atorvastatin (LIPITOR) 10 MG tablet TAKE ONE TABLET BY MOUTH EVERY DAY 90 tablet 1    famotidine (PEPCID) 40 MG tablet Take 1 tablet by mouth every evening 90 tablet 1    loratadine (CLARITIN) 10 MG tablet Take 1 tablet by mouth daily 90 tablet 1    Cholecalciferol (VITAMIN D3) 50 MCG (2000 UT) CAPS Take one tab po daily 90 capsule 1    EPINEPHrine (EPIPEN 2-EVANGELISTA) 0.3 MG/0.3ML SOAJ injection Inject 0.3 mLs into the muscle as needed (bee) Use as directed for allergic reaction 1 each 1    aspirin EC 81 MG EC tablet Take 1 tablet by mouth daily 90 tablet 1    calcium carbonate (CALCIUM 600) 600 MG TABS tablet Take 1 tablet by mouth daily 90 tablet 1     MG capsule TAKE ONE CAPSULE BY MOUTH ONCE DAILY AS NEEDED FOR CONSTIPATION 30 capsule 0    triamcinolone (KENALOG) 0.1 % cream Apply topically 2 times daily. 45 g 0    desonide (DESOWEN) 0.05 % cream Apply to affected area on face and neck twice daily, 5 days on 2 days off, as needed.  fluocinonide (LIDEX) 0.05 % cream Apply to affected area twice daily, 5 days on 2 days off, as needed. Not for face, armpits, or groin.  Psyllium (METAMUCIL FIBER PO) Take by mouth      amphetamine-dextroamphetamine (ADDERALL) 30 MG tablet Take 30 mg by mouth daily. Valdene Simier lisdexamfetamine (VYVANSE) 70 MG capsule Take 1 capsule by mouth daily.  sildenafil (REVATIO) 20 MG tablet Take 2 or 3 tabs daily 1 hour (range: 30 minutes to 4 hours) before sexual activity as needed. Maximum 100 mg daily 60 tablet 0    mineral oil-hydrophilic petrolatum (AQUAPHOR) ointment Apply topically as needed. 396 g 3     No current facility-administered medications for this visit. Allergies   Allergen Reactions    Bee Venom     Dye [Iodides] Other (See Comments)     Patient reports father had an anaphylactic reaction to iodide which result in death    Iodine Solution [Povidone Iodine] Rash     TOPICAL AND INJECTED    Sulfa Antibiotics Rash    Topical Sulfur Rash       Health Maintenance Due   Topic Date Due    Shingles Vaccine (1 of 2) 07/18/2012    Lipid screen  04/18/2020    Potassium monitoring  04/18/2020    Creatinine monitoring  04/18/2020    PSA counseling  02/03/2021           REVIEW OF SYSTEMS  Review of Systems   Constitutional: Negative for chills, fatigue and fever. HENT: Negative for congestion, ear pain, postnasal drip, rhinorrhea and sore throat. Eyes: Negative for pain and itching. Respiratory: Negative for cough, chest tightness and shortness of breath. Cardiovascular: Negative for chest pain and leg swelling. Gastrointestinal: Negative for abdominal pain, constipation, diarrhea and nausea. Endocrine: Negative for heat intolerance. Genitourinary: Negative for flank pain, frequency and urgency. Musculoskeletal: Negative for arthralgias and back pain. Skin: Positive for rash. Negative for pallor. Allergic/Immunologic: Negative for environmental allergies. Neurological: Negative for dizziness, numbness and headaches. Hematological: Does not bruise/bleed easily. Psychiatric/Behavioral: Negative for agitation, behavioral problems and hallucinations.        PHYSICAL EXAM  BP (!) 150/92 (Site: Right Upper Arm, Position: Sitting, Cuff Size: Large 08/27/2018    CHOL 136 09/11/2017     Lab Results   Component Value Date    TRIG 207 (H) 04/18/2019    TRIG 156 (H) 08/27/2018    TRIG 203 (H) 09/11/2017     Lab Results   Component Value Date    HDL 37 04/18/2019    HDL 42 08/27/2018    HDL 43 09/11/2017     Lab Results   Component Value Date    LDLCALC 132 (H) 04/18/2019    LDLCALC 79 08/27/2018    LDLCALC 52 09/11/2017       Lab Results   Component Value Date    LABA1C 5.3 10/17/2017    LABA1C 5.0 02/08/2011     Lab Results   Component Value Date    LDLCALC 132 (H) 04/18/2019    CREATININE 1.0 04/18/2019       ASSESSMENT/PLAN:     Diagnosis Orders   1. Rhus dermatitis     2. Essential hypertension     3. Prostate cancer metastatic to pelvis [facility])       Kenalog shot given today. Did recommend as well to start clobetasol cream on hands but not on face. As for BP will continue to monitor slightly elevated today. Continue to follow with urology as directed    Problem list reviewed andsimplified/updated  HM reviewed today and counseled as appropriate    Call or go to ED immediately if symptoms worsen or persist.  Future Appointments   Date Time Provider Sanjuanita Dueñas   1/20/2021  1:30 PM DO Fito Clemens [facility]     Or sooner if necessary.       Educational materials and/or homeexercises printed for patient's review and were included in patient instructions on his/her After Visit Summary and given to patient at the end of visit.       Counseled regarding above diagnosis, including possible risks and complications,  especially if left uncontrolled.     Counseled regarding the possible side effects, risks, benefits and alternatives to treatment; patient and/or guardian verbalizes understanding, agrees,feels comfortable with and wishes to proceed with above treatment plan.     Advised patient to call [facility] [place] new medication issues, and read all Rx info from pharmacy to assure aware of all possible risks and side effects of medication before taking.     Reviewed age and gender appropriate health screening exams and vaccinations. Advised patient regarding importance of keeping up with recommended health maintenance and toschedule as soon as possible if overdue, as this is important in assessing for undiagnosed pathology, especially cancer, as well as protecting against potentially harmful/life threatening disease.          Patient and/or guardian verbalizes understanding and agrees with above counseling, assessment and plan.     All questions answered. Mohit 5, DO  1/5/21    NOTE: This report was transcribed using voice recognition software.  Every effort was made to ensure accuracy; however, inadvertent computerized transcription errors may be present

## 2021-01-06 ASSESSMENT — ENCOUNTER SYMPTOMS
ABDOMINAL PAIN: 0
SHORTNESS OF BREATH: 0
CONSTIPATION: 0
RHINORRHEA: 0
DIARRHEA: 0
NAUSEA: 0
EYE PAIN: 0
EYE ITCHING: 0
BACK PAIN: 0
CHEST TIGHTNESS: 0
COUGH: 0
SORE THROAT: 0

## 2021-01-18 ENCOUNTER — HOSPITAL ENCOUNTER (OUTPATIENT)
Age: 61
Discharge: HOME | End: 2021-01-18
Payer: COMMERCIAL

## 2021-01-18 DIAGNOSIS — E78.2 MIXED HYPERLIPIDEMIA: ICD-10-CM

## 2021-01-18 DIAGNOSIS — I10 ESSENTIAL HYPERTENSION: ICD-10-CM

## 2021-01-18 LAB
ALBUMIN SERPL-MCNC: 4.4 G/DL (ref 3.5–5.2)
ALP BLD-CCNC: 113 U/L (ref 40–129)
ALT SERPL-CCNC: 25 U/L (ref 0–40)
ANION GAP SERPL CALCULATED.3IONS-SCNC: 10 MMOL/L (ref 7–16)
AST SERPL-CCNC: 20 U/L (ref 0–39)
BILIRUB SERPL-MCNC: 0.4 MG/DL (ref 0–1.2)
BUN BLDV-MCNC: 12 MG/DL (ref 6–20)
CALCIUM SERPL-MCNC: 9.6 MG/DL (ref 8.6–10.2)
CHLORIDE BLD-SCNC: 103 MMOL/L (ref 98–107)
CHOLESTEROL, TOTAL: 167 MG/DL (ref 0–199)
CO2: 27 MMOL/L (ref 22–29)
CREAT SERPL-MCNC: 0.9 MG/DL (ref 0.7–1.2)
GFR AFRICAN AMERICAN: >60
GFR NON-AFRICAN AMERICAN: >60 ML/MIN/1.73
GLUCOSE BLD-MCNC: 98 MG/DL (ref 74–99)
HDLC SERPL-MCNC: 61 MG/DL
LDL CHOLESTEROL CALCULATED: 75 MG/DL (ref 0–99)
POTASSIUM SERPL-SCNC: 4.2 MMOL/L (ref 3.5–5)
SODIUM BLD-SCNC: 140 MMOL/L (ref 132–146)
TOTAL PROTEIN: 7.7 G/DL (ref 6.4–8.3)
TRIGL SERPL-MCNC: 157 MG/DL (ref 0–149)
VLDLC SERPL CALC-MCNC: 31 MG/DL

## 2021-01-18 PROCEDURE — 80061 LIPID PANEL: CPT

## 2021-01-18 PROCEDURE — 80053 COMPREHEN METABOLIC PANEL: CPT

## 2021-01-18 PROCEDURE — 36415 COLL VENOUS BLD VENIPUNCTURE: CPT

## 2021-01-20 ENCOUNTER — OFFICE VISIT (OUTPATIENT)
Dept: FAMILY MEDICINE CLINIC | Age: 61
End: 2021-01-20
Payer: COMMERCIAL

## 2021-01-20 VITALS
SYSTOLIC BLOOD PRESSURE: 142 MMHG | WEIGHT: 255.4 LBS | TEMPERATURE: 97.3 F | HEART RATE: 86 BPM | BODY MASS INDEX: 35.76 KG/M2 | DIASTOLIC BLOOD PRESSURE: 100 MMHG | HEIGHT: 71 IN | OXYGEN SATURATION: 99 % | RESPIRATION RATE: 20 BRPM

## 2021-01-20 DIAGNOSIS — I10 ESSENTIAL HYPERTENSION: Primary | ICD-10-CM

## 2021-01-20 DIAGNOSIS — C79.89 PROSTATE CANCER METASTATIC TO PELVIS (HCC): ICD-10-CM

## 2021-01-20 DIAGNOSIS — C61 PROSTATE CANCER METASTATIC TO PELVIS (HCC): ICD-10-CM

## 2021-01-20 DIAGNOSIS — Z76.0 MEDICATION REFILL: ICD-10-CM

## 2021-01-20 DIAGNOSIS — E78.2 MIXED HYPERLIPIDEMIA: ICD-10-CM

## 2021-01-20 PROCEDURE — G8482 FLU IMMUNIZE ORDER/ADMIN: HCPCS | Performed by: FAMILY MEDICINE

## 2021-01-20 PROCEDURE — 1036F TOBACCO NON-USER: CPT | Performed by: FAMILY MEDICINE

## 2021-01-20 PROCEDURE — G8417 CALC BMI ABV UP PARAM F/U: HCPCS | Performed by: FAMILY MEDICINE

## 2021-01-20 PROCEDURE — G8427 DOCREV CUR MEDS BY ELIG CLIN: HCPCS | Performed by: FAMILY MEDICINE

## 2021-01-20 PROCEDURE — 99214 OFFICE O/P EST MOD 30 MIN: CPT | Performed by: FAMILY MEDICINE

## 2021-01-20 PROCEDURE — 3017F COLORECTAL CA SCREEN DOC REV: CPT | Performed by: FAMILY MEDICINE

## 2021-01-20 RX ORDER — LORATADINE 10 MG/1
10 TABLET ORAL DAILY
Qty: 90 TABLET | Refills: 1 | Status: SHIPPED
Start: 2021-01-20 | End: 2021-08-31 | Stop reason: SDUPTHER

## 2021-01-20 RX ORDER — ASPIRIN 81 MG/1
81 TABLET ORAL DAILY
Qty: 90 TABLET | Refills: 1 | Status: SHIPPED
Start: 2021-01-20 | End: 2021-08-31 | Stop reason: SDUPTHER

## 2021-01-20 RX ORDER — LOSARTAN POTASSIUM 100 MG/1
TABLET ORAL
Qty: 90 TABLET | Refills: 1 | Status: SHIPPED
Start: 2021-01-20 | End: 2021-08-31 | Stop reason: SDUPTHER

## 2021-01-20 RX ORDER — METOPROLOL SUCCINATE 50 MG/1
50 TABLET, EXTENDED RELEASE ORAL 2 TIMES DAILY
Qty: 60 TABLET | Refills: 3 | Status: SHIPPED
Start: 2021-01-20 | End: 2021-06-22 | Stop reason: SDUPTHER

## 2021-01-20 RX ORDER — ACETAMINOPHEN 160 MG
TABLET,DISINTEGRATING ORAL
Qty: 90 CAPSULE | Refills: 1 | Status: SHIPPED
Start: 2021-01-20 | End: 2021-12-07 | Stop reason: SDUPTHER

## 2021-01-20 RX ORDER — ATORVASTATIN CALCIUM 10 MG/1
TABLET, FILM COATED ORAL
Qty: 90 TABLET | Refills: 1 | Status: SHIPPED
Start: 2021-01-20 | End: 2021-08-31 | Stop reason: SDUPTHER

## 2021-01-20 RX ORDER — FAMOTIDINE 40 MG/1
40 TABLET, FILM COATED ORAL EVERY EVENING
Qty: 90 TABLET | Refills: 1 | Status: SHIPPED
Start: 2021-01-20 | End: 2021-08-31 | Stop reason: SDUPTHER

## 2021-01-20 RX ORDER — PHENOL 1.4 %
1 AEROSOL, SPRAY (ML) MUCOUS MEMBRANE DAILY
Qty: 90 TABLET | Refills: 1 | Status: SHIPPED | OUTPATIENT
Start: 2021-01-20

## 2021-01-20 RX ORDER — LACTOBACILL 46/B.ANIMAL/INULIN 10B-100 MG
1 CAPSULE ORAL DAILY
Qty: 90 CAPSULE | Refills: 1 | Status: SHIPPED
Start: 2021-01-20 | End: 2021-08-12 | Stop reason: CLARIF

## 2021-01-20 ASSESSMENT — ENCOUNTER SYMPTOMS
RHINORRHEA: 0
BACK PAIN: 0
DIARRHEA: 0
SHORTNESS OF BREATH: 0
CONSTIPATION: 0
CHEST TIGHTNESS: 0
EYE PAIN: 0
EYE ITCHING: 0
NAUSEA: 0
ABDOMINAL PAIN: 0
COUGH: 0
SORE THROAT: 0

## 2021-01-20 NOTE — PROGRESS NOTES
Ai Russo  : 1960    Chief Complaint:     Chief Complaint   Patient presents with    Hypertension       HPI  Ai Russo 64 y.o. presents for   Chief Complaint   Patient presents with    Hypertension     Patient presents for follow-up today. His blood pressure continues to be elevated. He states he has been taking his medication as prescribed. He cannot tolerate 100 mg at 1 time of metoprolol. He does take a half a tab in the morning and a full tab at night of 50 mg. He does take cholesterol medication and is tolerating this well. He does follow with urology for prostate CA and is currently on medication for this. He is also on Adderall and Vyvanse may be contributing to his BP     All questions were answered to patients satisfaction. Past Medical History:   Diagnosis Date    Acute drug-induced gout of left foot 2015    Had gout attack on 7/18/15. Seems to be HCTZ induced.       ADHD (attention deficit hyperactivity disorder)     Back pain, chronic     Depression     Hyperlipidemia     Hypertension     Left elbow fracture 10/27/2015    MRSA (methicillin resistant staph aureus) culture positive     Seasonal allergies        Past Surgical History:   Procedure Laterality Date    APPENDECTOMY  1972    COLECTOMY Bilateral 2019    COLONOSCOPY  2013    sigmoid diverticulosis, Dr. Karen Renteria, [address] [place] [address] COLONOSCOPY  2013    ENDOSCOPIC ULTRASOUND (LOWER) Bilateral 2019    upper    FOOT SURGERY      RIGHT DUE TO FX    LUMBAR DISCECTOMY      [facility]     OTHER SURGICAL HISTORY  2018    removal and replacement of spinal cord stimulator    [place] IMPLANT NEUROSTIM/ N/A 3/28/2018    REMOVAL OF T7 - T8 MALFUCTIONING  STIMULATOR, REPLACEMENT OF T7- T8 SPINAL CORD STIMULATOR --CASTILLO MURILLO, [employer] performed by [place] Gilbert MD at [place] [address]      insertion of spinal cord stimulator and since then has had 8 different surgeries to take it out and put it in due to MRSA infections    SPINE SURGERY  10/11/2011    INSERTION OF SPINAL CORD STIMULATOR    SPINE SURGERY  6/2012    change of spinal cord stimulator, [address] History     Socioeconomic History    Marital status:      Spouse name: Not on file    Number of children: Not on file    Years of education: Not on file    Highest education level: Not on file   Occupational History    Occupation: retired    Social Needs    Financial resource strain: Not on file    Food insecurity     Worry: Not on file     Inability: Not on file   [employer] needs     Medical: Not on file     Non-medical: Not on file   Tobacco Use    Smoking status: Never Smoker    Smokeless tobacco: Never Used   Substance and Sexual Activity    Alcohol use: Yes     Comment: rare    Drug use: Yes     Comment: used to do cocaine 2013    Sexual activity: Not Currently   Lifestyle    Physical activity     Days per week: Not on file     Minutes per session: Not on file    Stress: Not on file   Relationships    Social connections     Talks on phone: Not on file     Gets together: Not on file     Attends Quaker service: Not on file     Active member of club or organization: Not on file     Attends meetings of clubs or organizations: Not on file     Relationship status: Not on file    Intimate partner violence     Fear of current or ex partner: Not on file     Emotionally abused: Not on file     Physically abused: Not on file     Forced sexual activity: Not on file   Other Topics Concern    Not on file   Social History Narrative    Not on file       Family History   Problem Relation Age of Onset    Heart Failure Mother         CABG at 61     Heart Disease Mother     High Blood Pressure Mother     Asthma Mother     Heart Failure Father         CABG at 63's    Hypertension Father     Diabetes Father     Heart Disease Father    Collette Satchel High Blood Pressure Father           Current Outpatient Medications   Medication Sig Dispense Refill    aspirin EC 81 MG EC tablet Take 1 tablet by mouth daily 90 tablet 1    famotidine (PEPCID) 40 MG tablet Take 1 tablet by mouth every evening 90 tablet 1    atorvastatin (LIPITOR) 10 MG tablet TAKE ONE TABLET BY MOUTH EVERY DAY 90 tablet 1    loratadine (CLARITIN) 10 MG tablet Take 1 tablet by mouth daily 90 tablet 1    Probiotic Product (ADVANCED PROBIOTIC 10) CAPS Take 1 capsule by mouth daily 90 capsule 1    calcium carbonate (CALCIUM 600) 600 MG TABS tablet Take 1 tablet by mouth daily 90 tablet 1    Cholecalciferol (VITAMIN D3) 50 MCG (2000 UT) CAPS Take one tab po daily 90 capsule 1    metoprolol succinate (TOPROL XL) 50 MG extended release tablet Take 1 tablet by mouth 2 times daily 60 tablet 3    losartan (COZAAR) 100 MG tablet TAKE ONE TABLET BY MOUTH EVERY DAY 90 tablet 1    clobetasol (TEMOVATE) 0.05 % ointment Apply topically 2 times daily. 60 g 2    leuprolide acetate, 4 Month, (ELIGARD) 30 MG injection Inject into the skin every 6 months      ibuprofen (ADVIL;MOTRIN) 800 MG tablet Take 1 tablet by mouth every 8 hours as needed for Pain 90 tablet 3    XTANDI 40 MG capsule       tamsulosin (FLOMAX) 0.4 MG capsule       ketorolac (ACULAR) 0.5 % ophthalmic solution Place 1 drop into both eyes 4 times daily 1 Bottle 0    fluticasone (FLONASE) 50 MCG/ACT nasal spray PLACE 1 OR 2 SPRAYS INTO EACH NOSTRIL ONCE DAILY AS DIRECTED 16 g 2    EPINEPHrine (EPIPEN 2-EVANGELISTA) 0.3 MG/0.3ML SOAJ injection Inject 0.3 mLs into the muscle as needed (bee) Use as directed for allergic reaction 1 each 1     MG capsule TAKE ONE CAPSULE BY MOUTH ONCE DAILY AS NEEDED FOR CONSTIPATION 30 capsule 0    triamcinolone (KENALOG) 0.1 % cream Apply topically 2 times daily. 45 g 0    desonide (DESOWEN) 0.05 % cream Apply to affected area on face and neck twice daily, 5 days on 2 days off, as needed.       fluocinonide (LIDEX) 0.05 % cream Apply to affected area twice daily, 5 days on 2 days off, as needed. Not for face, armpits, or groin.  Psyllium (METAMUCIL FIBER PO) Take by mouth      amphetamine-dextroamphetamine (ADDERALL) 30 MG tablet Take 30 mg by mouth daily. Downsmairelena Cedeño lisdexamfetamine (VYVANSE) 70 MG capsule Take 1 capsule by mouth daily.  sildenafil (REVATIO) 20 MG tablet Take 2 or 3 tabs daily 1 hour (range: 30 minutes to 4 hours) before sexual activity as needed. Maximum 100 mg daily 60 tablet 0    mineral oil-hydrophilic petrolatum (AQUAPHOR) ointment Apply topically as needed. 396 g 3     No current facility-administered medications for this visit. Allergies   Allergen Reactions    Bee Venom     Dye [Iodides] Other (See Comments)     Patient reports father had an anaphylactic reaction to iodide which result in death    Iodine Solution [Povidone Iodine] Rash     TOPICAL AND INJECTED    Sulfa Antibiotics Rash    Topical Sulfur Rash       Health Maintenance Due   Topic Date Due    Shingles Vaccine (1 of 2) 07/18/2012    PSA counseling  02/03/2021           REVIEW OF SYSTEMS  Review of Systems   Constitutional: Negative for chills, fatigue and fever. HENT: Negative for congestion, ear pain, postnasal drip, rhinorrhea and sore throat. Eyes: Negative for pain and itching. Respiratory: Negative for cough, chest tightness and shortness of breath. Cardiovascular: Negative for chest pain and leg swelling. Gastrointestinal: Negative for abdominal pain, constipation, diarrhea and nausea. Endocrine: Negative for heat intolerance. Genitourinary: Negative for flank pain, frequency and urgency. Musculoskeletal: Negative for arthralgias and back pain. Skin: Negative for pallor and rash. Allergic/Immunologic: Negative for environmental allergies. Neurological: Negative for dizziness, numbness and headaches. Hematological: Does not bruise/bleed easily. Psychiatric/Behavioral: Negative for agitation, behavioral problems and hallucinations. PHYSICAL EXAM  BP (!) 142/100   Pulse 86   Temp 97.3 °F (36.3 °C) (Temporal)   Resp 20   Ht 5' 11\" (1.803 m)   Wt 255 lb 6.4 oz (115.8 kg)   SpO2 99%   BMI 35.62 kg/m²   Physical Exam  Vitals signs and nursing note reviewed. Constitutional:       General: He is not in acute distress. Appearance: He is well-developed. He is not diaphoretic. HENT:      Head: Normocephalic and atraumatic. Right Ear: External ear normal.      Left Ear: External ear normal.      Nose: Nose normal.      Mouth/Throat:      Pharynx: No oropharyngeal exudate. Eyes:      General:         Right eye: No discharge. Left eye: No discharge. Conjunctiva/sclera: Conjunctivae normal.      Comments: Slightly dry skin around the eyes no stye noted in the right eye   Neck:      Musculoskeletal: Normal range of motion and neck supple. Cardiovascular:      Rate and Rhythm: Normal rate and regular rhythm. Heart sounds: Normal heart sounds. No murmur. Pulmonary:      Effort: Pulmonary effort is normal.      Breath sounds: Normal breath sounds. Abdominal:      Palpations: Abdomen is soft. Tenderness: There is no abdominal tenderness. Musculoskeletal: Normal range of motion. General: No tenderness. Skin:     General: Skin is warm and dry. Findings: No erythema or rash. Neurological:      Mental Status: He is alert and oriented to person, place, and time. Coordination: Coordination normal.      Deep Tendon Reflexes: Reflexes are normal and symmetric. Psychiatric:         Behavior: Behavior normal.         Thought Content: Thought content normal.         Judgment: Judgment normal.              Laboratory:   All laboratory and radiology results have been personally reviewed by myself    Lab Results   Component Value Date     01/18/2021    K 4.2 01/18/2021    K 4.2 03/26/2018     01/18/2021    CO2 27 01/18/2021    BUN 12 01/18/2021    CREATININE 0.9 01/18/2021    PROT 7.7 01/18/2021    LABALBU 4.4 01/18/2021    LABALBU 4.4 08/01/2011    CALCIUM 9.6 01/18/2021    GFRAA >60 01/18/2021    LABGLOM >60 01/18/2021    GLUCOSE 98 01/18/2021    GLUCOSE 59 08/01/2011    AST 20 01/18/2021    ALT 25 01/18/2021    ALKPHOS 113 01/18/2021    BILITOT 0.4 01/18/2021    TSH 2.640 04/18/2019    VITD25 46 04/18/2019    CHOL 167 01/18/2021    TRIG 157 01/18/2021    HDL 61 01/18/2021    LDLCALC 75 01/18/2021    LABA1C 5.3 10/17/2017        Lab Results   Component Value Date    CHOL 167 01/18/2021    CHOL 210 (H) 04/18/2019    CHOL 152 08/27/2018     Lab Results   Component Value Date    TRIG 157 (H) 01/18/2021    TRIG 207 (H) 04/18/2019    TRIG 156 (H) 08/27/2018     Lab Results   Component Value Date    HDL 61 01/18/2021    HDL 37 04/18/2019    HDL 42 08/27/2018     Lab Results   Component Value Date    LDLCALC 75 01/18/2021    LDLCALC 132 (H) 04/18/2019    LDLCALC 79 08/27/2018       Lab Results   Component Value Date    LABA1C 5.3 10/17/2017    LABA1C 5.0 02/08/2011     Lab Results   Component Value Date    LDLCALC 75 01/18/2021    CREATININE 0.9 01/18/2021       ASSESSMENT/PLAN:     Diagnosis Orders   1. Essential hypertension  metoprolol succinate (TOPROL XL) 50 MG extended release tablet   2. Mixed hyperlipidemia  atorvastatin (LIPITOR) 10 MG tablet   3. Prostate cancer metastatic to pelvis ([facility] [address]     4. Medication refill  loratadine (CLARITIN) 10 MG tablet    Cholecalciferol (VITAMIN D3) 50 MCG (2000 UT) CAPS     Change metoprolol to 50 mg twice daily. If this does not work then would possibly add hydralazine or spironolactone. Patient agreeable with the above. We will continue Lipitor in the meantime. Continue to follow with urology for prostate CA. Did discuss that Vyvanse and Adderall may also be contributing to his BP.   Will have patient return in 1 month for video visit as patient can check his pressure at home. Problem list reviewed andsimplified/updated  HM reviewed today and counseled as appropriate    Call or go to ED immediately if symptoms worsen or persist.  Future Appointments   Date Time Provider Sanjuanita Dueñas   2/23/2021  1:45 PM DO Fito Pascal [facility]     Or sooner if necessary. Educational materials and/or homeexercises printed for patient's review and were included in patient instructions on his/her After Visit Summary and given to patient at the end of visit.       Counseled regarding above diagnosis, including possible risks and complications,  especially if left uncontrolled.     Counseled regarding the possible side effects, risks, benefits and alternatives to treatment; patient and/or guardian verbalizes understanding, agrees,feels comfortable with and wishes to proceed with above treatment plan.     Advised patient to call [facility] new medication issues, and read all Rx info from pharmacy to assure aware of all possible risks and side effects of medication before taking.     Reviewed age and gender appropriate health screening exams and vaccinations. Advised patient regarding importance of keeping up with recommended health maintenance and toschedule as soon as possible if overdue, as this is important in assessing for undiagnosed pathology, especially cancer, as well as protecting against potentially harmful/life threatening disease.          Patient and/or guardian verbalizes understanding and agrees with above counseling, assessment and plan.     All questions answered. Daren Bermudez DO  1/20/21    NOTE: This report was transcribed using voice recognition software.  Every effort was made to ensure accuracy; however, inadvertent computerized transcription errors may be present

## 2021-02-04 ENCOUNTER — HOSPITAL ENCOUNTER (OUTPATIENT)
Age: 61
Discharge: HOME | End: 2021-02-04
Payer: COMMERCIAL

## 2021-02-04 LAB — PROSTATE SPECIFIC ANTIGEN: <0.03 NG/ML (ref 0–4)

## 2021-02-04 PROCEDURE — 84153 ASSAY OF PSA TOTAL: CPT

## 2021-02-04 PROCEDURE — 36415 COLL VENOUS BLD VENIPUNCTURE: CPT

## 2021-02-23 ENCOUNTER — VIRTUAL VISIT (OUTPATIENT)
Dept: FAMILY MEDICINE CLINIC | Age: 61
End: 2021-02-23
Payer: COMMERCIAL

## 2021-02-23 DIAGNOSIS — I10 ESSENTIAL HYPERTENSION: Primary | ICD-10-CM

## 2021-02-23 DIAGNOSIS — E78.2 MIXED HYPERLIPIDEMIA: ICD-10-CM

## 2021-02-23 DIAGNOSIS — K21.9 GASTROESOPHAGEAL REFLUX DISEASE WITHOUT ESOPHAGITIS: ICD-10-CM

## 2021-02-23 PROCEDURE — 99213 OFFICE O/P EST LOW 20 MIN: CPT | Performed by: FAMILY MEDICINE

## 2021-02-23 PROCEDURE — 3017F COLORECTAL CA SCREEN DOC REV: CPT | Performed by: FAMILY MEDICINE

## 2021-02-23 PROCEDURE — G8427 DOCREV CUR MEDS BY ELIG CLIN: HCPCS | Performed by: FAMILY MEDICINE

## 2021-02-23 RX ORDER — HYDRALAZINE HYDROCHLORIDE 10 MG/1
10 TABLET, FILM COATED ORAL 3 TIMES DAILY
Qty: 90 TABLET | Refills: 3 | Status: SHIPPED
Start: 2021-02-23 | End: 2021-06-22 | Stop reason: SDUPTHER

## 2021-02-23 NOTE — PROGRESS NOTES
This visit was performed as a virtual video visit using a synchronous, two-way, audio-video telehealth technology platform. Patient identification was verified at the start of the visit, including the patient's telephone number and physical location. I discussed with the patient the nature of our telehealth visits, that:     1. Due to the nature of an audio- video modality, the only components of a physical exam that could be done are the elements supported by direct observation. 2. I would evaluate the patient and recommend diagnostics and treatments based on my assessment. 3. If it was felt that the patient should be evaluated in clinic or an emergency room setting, then they would be directed there. 4. Our sessions are not being recorded and that personal health information is protected. 5. Our team would provide follow up care in person if/when the patient needs it. Patient does agree to proceed with telemedicine consultation. [facility]    Physician  location [place] [address] office   other people involved in call: significant other        Time spent: Greater than Not billed by time    This visit was completed virtually using Doxy. me        Tomasz Miles  : 1960    Chief Complaint:     Chief Complaint   Patient presents with    Hypertension     1 mo follow up, has been taking 75mg metoprolol, has been tracking higher readings       HPI  Tomasz Miles 64 y.o. presents for   Chief Complaint   Patient presents with    Hypertension     1 mo follow up, has been taking 75mg metoprolol, has been tracking higher readings     BP continues to be high. He is only taking 75 mg daily instead of 100 as he did not tolerate this. His left arm is always higher than the right. He states he had surgery on the left arm prior. However his readings continue to be in the 140s over 80s. All questions were answered to patients satisfaction.     Past Medical History:   Diagnosis Date    Acute drug-induced gout of left foot 8/29/2015    Had gout attack on 7/18/15. Seems to be HCTZ induced.  ADHD (attention deficit hyperactivity disorder)     Back pain, chronic     Depression     Hyperlipidemia     Hypertension     Left elbow fracture 10/27/2015    MRSA (methicillin resistant staph aureus) culture positive     Seasonal allergies        Past Surgical History:   Procedure Laterality Date    APPENDECTOMY  1972    COLECTOMY Bilateral 02/2019    COLONOSCOPY  2/1/2013    sigmoid diverticulosis, Dr. Argelia Humphrey, [address] [place] [address] COLONOSCOPY  02/01/2013    ENDOSCOPIC ULTRASOUND (LOWER) Bilateral 02/2019    upper    FOOT SURGERY  1990s    RIGHT DUE TO FX    LUMBAR DISCECTOMY  2007    [facility]     OTHER SURGICAL HISTORY  03/28/2018    removal and replacement of spinal cord stimulator    AL IMPLANT NEUROSTIM/ N/A 3/28/2018    REMOVAL OF T7 - T8 MALFUCTIONING  STIMULATOR, REPLACEMENT OF T7- T8 SPINAL CORD STIMULATOR --LIDIA, CASTILLO TABLE, [employer] performed by Jus Abdalla MD at [place] [address]  2009    insertion of spinal cord stimulator and since then has had 8 different surgeries to take it out and put it in due to MRSA infections    SPINE SURGERY  10/11/2011    INSERTION OF SPINAL CORD STIMULATOR    SPINE SURGERY  6/2012    change of spinal cord stimulator, Dr. Randi Metcalf, [facility]    TONSILLECTOMY  1972       Social History     Socioeconomic History    Marital status:      Spouse name: None    Number of children: None    Years of education: None    Highest education level: None   Occupational History    Occupation: retired    Social Needs    Financial resource strain: None    Food insecurity     Worry: None     Inability: None    Transportation needs     Medical: None     Non-medical: None   Tobacco Use    Smoking status: Never Smoker    Smokeless tobacco: Never Used   Substance and Sexual Activity    Alcohol use: Yes     Comment: rare    Drug use:  Yes Comment: used to do cocaine 2013    Sexual activity: Not Currently   Lifestyle    Physical activity     Days per week: None     Minutes per session: None    Stress: None   Relationships    Social connections     Talks on phone: None     Gets together: None     Attends Islam service: None     Active member of club or organization: None     Attends meetings of clubs or organizations: None     Relationship status: None    Intimate partner violence     Fear of current or ex partner: None     Emotionally abused: None     Physically abused: None     Forced sexual activity: None   Other Topics Concern    None   Social History Narrative    None       Family History   Problem Relation Age of Onset    Heart Failure Mother         CABG at 61     Heart Disease Mother     High Blood Pressure Mother     Asthma Mother     Heart Failure Father         CABG at 63's    Hypertension Father     Diabetes Father     Heart Disease Father     High Blood Pressure Father           Current Outpatient Medications   Medication Sig Dispense Refill    hydrALAZINE (APRESOLINE) 10 MG tablet Take 1 tablet by mouth 3 times daily 90 tablet 3    aspirin EC 81 MG EC tablet Take 1 tablet by mouth daily 90 tablet 1    famotidine (PEPCID) 40 MG tablet Take 1 tablet by mouth every evening 90 tablet 1    atorvastatin (LIPITOR) 10 MG tablet TAKE ONE TABLET BY MOUTH EVERY DAY 90 tablet 1    loratadine (CLARITIN) 10 MG tablet Take 1 tablet by mouth daily 90 tablet 1    Probiotic Product (ADVANCED PROBIOTIC 10) CAPS Take 1 capsule by mouth daily 90 capsule 1    calcium carbonate (CALCIUM 600) 600 MG TABS tablet Take 1 tablet by mouth daily 90 tablet 1    Cholecalciferol (VITAMIN D3) 50 MCG (2000 UT) CAPS Take one tab po daily 90 capsule 1    metoprolol succinate (TOPROL XL) 50 MG extended release tablet Take 1 tablet by mouth 2 times daily 60 tablet 3    losartan (COZAAR) 100 MG tablet TAKE ONE TABLET BY MOUTH EVERY DAY 90 tablet 1    clobetasol (TEMOVATE) 0.05 % ointment Apply topically 2 times daily. 60 g 2    leuprolide acetate, 4 Month, (ELIGARD) 30 MG injection Inject into the skin every 6 months      ibuprofen (ADVIL;MOTRIN) 800 MG tablet Take 1 tablet by mouth every 8 hours as needed for Pain 90 tablet 3    XTANDI 40 MG capsule       tamsulosin (FLOMAX) 0.4 MG capsule       ketorolac (ACULAR) 0.5 % ophthalmic solution Place 1 drop into both eyes 4 times daily 1 Bottle 0    fluticasone (FLONASE) 50 MCG/ACT nasal spray PLACE 1 OR 2 SPRAYS INTO EACH NOSTRIL ONCE DAILY AS DIRECTED 16 g 2    EPINEPHrine (EPIPEN 2-EVANGELISTA) 0.3 MG/0.3ML SOAJ injection Inject 0.3 mLs into the muscle as needed (bee) Use as directed for allergic reaction 1 each 1     MG capsule TAKE ONE CAPSULE BY MOUTH ONCE DAILY AS NEEDED FOR CONSTIPATION 30 capsule 0    triamcinolone (KENALOG) 0.1 % cream Apply topically 2 times daily. 45 g 0    desonide (DESOWEN) 0.05 % cream Apply to affected area on face and neck twice daily, 5 days on 2 days off, as needed.  fluocinonide (LIDEX) 0.05 % cream Apply to affected area twice daily, 5 days on 2 days off, as needed. Not for face, armpits, or groin.  Psyllium (METAMUCIL FIBER PO) Take by mouth      amphetamine-dextroamphetamine (ADDERALL) 30 MG tablet Take 30 mg by mouth daily. Rylee Dodd lisdexamfetamine (VYVANSE) 70 MG capsule Take 1 capsule by mouth daily.  sildenafil (REVATIO) 20 MG tablet Take 2 or 3 tabs daily 1 hour (range: 30 minutes to 4 hours) before sexual activity as needed. Maximum 100 mg daily 60 tablet 0    mineral oil-hydrophilic petrolatum (AQUAPHOR) ointment Apply topically as needed. 396 g 3     No current facility-administered medications for this visit.         Allergies   Allergen Reactions    Bee Venom     Dye [Iodides] Other (See Comments)     Patient reports father had an anaphylactic reaction to iodide which result in death    Iodine Solution [Povidone Iodine] Rash TOPICAL AND INJECTED    Sulfa Antibiotics Rash    Topical Sulfur Rash       Health Maintenance Due   Topic Date Due    Shingles Vaccine (1 of 2) 07/18/2012           REVIEW OF SYSTEMS  Review of Systems   Constitutional: Negative for chills, fatigue and fever. HENT: Negative for congestion, ear pain, postnasal drip, rhinorrhea and sore throat. Eyes: Negative for pain and itching. Respiratory: Negative for cough, chest tightness and shortness of breath. Cardiovascular: Negative for chest pain and leg swelling. Gastrointestinal: Negative for abdominal pain, constipation, diarrhea and nausea. Endocrine: Negative for heat intolerance. Genitourinary: Negative for flank pain, frequency and urgency. Musculoskeletal: Negative for arthralgias and back pain. Skin: Negative for pallor and rash. Allergic/Immunologic: Negative for environmental allergies. Neurological: Negative for dizziness, numbness and headaches. Hematological: Does not bruise/bleed easily. Psychiatric/Behavioral: Negative for agitation, behavioral problems and hallucinations. PHYSICAL EXAM  There were no vitals taken for this visit. Physical Exam  Vitals signs reviewed. Constitutional:       Appearance: Normal appearance. He is normal weight. HENT:      Head: Normocephalic. Nose: Nose normal.   Eyes:      Extraocular Movements: Extraocular movements intact. Conjunctiva/sclera: Conjunctivae normal.   Neck:      Musculoskeletal: Normal range of motion. Pulmonary:      Effort: Pulmonary effort is normal.   Musculoskeletal: Normal range of motion. Skin:     Findings: No rash. Neurological:      General: No focal deficit present. Mental Status: He is alert and oriented to person, place, and time. Mental status is at baseline. Psychiatric:         Mood and Affect: Mood normal.         Behavior: Behavior normal.         Thought Content:  Thought content normal.         Judgment: Judgment normal. Laboratory: All laboratory and radiology results have been personally reviewed by myself    Lab Results   Component Value Date     01/18/2021    K 4.2 01/18/2021    K 4.2 03/26/2018     01/18/2021    CO2 27 01/18/2021    BUN 12 01/18/2021    CREATININE 0.9 01/18/2021    PROT 7.7 01/18/2021    LABALBU 4.4 01/18/2021    LABALBU 4.4 08/01/2011    CALCIUM 9.6 01/18/2021    GFRAA >60 01/18/2021    LABGLOM >60 01/18/2021    GLUCOSE 98 01/18/2021    GLUCOSE 59 08/01/2011    AST 20 01/18/2021    ALT 25 01/18/2021    ALKPHOS 113 01/18/2021    BILITOT 0.4 01/18/2021    TSH 2.640 04/18/2019    VITD25 46 04/18/2019    CHOL 167 01/18/2021    TRIG 157 01/18/2021    HDL 61 01/18/2021    LDLCALC 75 01/18/2021    LABA1C 5.3 10/17/2017        Lab Results   Component Value Date    CHOL 167 01/18/2021    CHOL 210 (H) 04/18/2019    CHOL 152 08/27/2018     Lab Results   Component Value Date    TRIG 157 (H) 01/18/2021    TRIG 207 (H) 04/18/2019    TRIG 156 (H) 08/27/2018     Lab Results   Component Value Date    HDL 61 01/18/2021    HDL 37 04/18/2019    HDL 42 08/27/2018     Lab Results   Component Value Date    LDLCALC 75 01/18/2021    LDLCALC 132 (H) 04/18/2019    LDLCALC 79 08/27/2018       Lab Results   Component Value Date    LABA1C 5.3 10/17/2017    LABA1C 5.0 02/08/2011     Lab Results   Component Value Date    LDLCALC 75 01/18/2021    CREATININE 0.9 01/18/2021       ASSESSMENT/PLAN:     Diagnosis Orders   1. Essential hypertension     2. Mixed hyperlipidemia     3. Gastroesophageal reflux disease without esophagitis       Will add low-dose hydralazine. Side effects medication reviewed with patient. He will return in 1 month for reevaluation    Problem list reviewed andsimplified/updated  HM reviewed today and counseled as appropriate    Call or go to ED immediately if symptoms worsen or persist.  No future appointments. Or sooner if necessary.       Educational materials and/or homeexercises printed for patient's review and were included in patient instructions on his/her After Visit Summary and given to patient at the end of visit. Counseled regarding above diagnosis, including possible risks and complications,  especially if left uncontrolled. Counseled regarding the possible side effects, risks, benefits and alternatives to treatment; patient and/or guardian verbalizes understanding, agrees,feels comfortable with and wishes to proceed with above treatment plan. Advised patient to call [facility] new medication issues, and read all Rx info from pharmacy to assure aware of all possible risks and side effects of medication before taking. Reviewed age and gender appropriate health screening exams and vaccinations. Advised patient regarding importance of keeping up with recommended health maintenance and toschedule as soon as possible if overdue, as this is important in assessing for undiagnosed pathology, especially cancer, as well as protecting against potentially harmful/life threatening disease. Patient and/or guardian verbalizes understanding and agrees with above counseling, assessment and plan. All questions answered. Mohit 5, DO  2/23/21    NOTE: This report was transcribed using voice recognition software. Every effort was made to ensure accuracy; however, inadvertent computerized transcription errors may be present    Aren Caruso is a 64 y.o. male being evaluated by a Virtual Visit (video visit) encounter to address concerns as mentioned above. A caregiver was present when appropriate. Due to this being a TeleHealth encounter ([facility]), evaluation of the following organ systems was limited: Vitals/Constitutional/EENT/Resp/CV/GI//MS/Neuro/Skin/Heme-Lymph-Imm.   Pursuant to the emergency declaration under the [address] [facility] [address] [facility] waiver authority and the [employer] and Response Supplemental Appropriations Act, this Virtual Visit was conducted with patient's (and/or legal guardian's) consent, to reduce the patient's risk of exposure to COVID-19 and provide necessary medical care. The patient (and/or legal guardian) has also been advised to contact this office for worsening conditions or problems, and seek emergency medical treatment and/or call 911 if deemed necessary.

## 2021-02-24 ASSESSMENT — ENCOUNTER SYMPTOMS
ABDOMINAL PAIN: 0
NAUSEA: 0
COUGH: 0
RHINORRHEA: 0
EYE PAIN: 0
SORE THROAT: 0
CONSTIPATION: 0
EYE ITCHING: 0
CHEST TIGHTNESS: 0
BACK PAIN: 0
DIARRHEA: 0
SHORTNESS OF BREATH: 0

## 2021-06-22 ENCOUNTER — TELEPHONE (OUTPATIENT)
Dept: FAMILY MEDICINE CLINIC | Age: 61
End: 2021-06-22

## 2021-06-22 DIAGNOSIS — I10 ESSENTIAL HYPERTENSION: ICD-10-CM

## 2021-06-22 RX ORDER — HYDRALAZINE HYDROCHLORIDE 10 MG/1
10 TABLET, FILM COATED ORAL 3 TIMES DAILY
Qty: 90 TABLET | Refills: 3 | Status: SHIPPED
Start: 2021-06-22 | End: 2021-11-24 | Stop reason: SDUPTHER

## 2021-06-22 RX ORDER — METOPROLOL SUCCINATE 50 MG/1
50 TABLET, EXTENDED RELEASE ORAL 2 TIMES DAILY
Qty: 60 TABLET | Refills: 3 | Status: SHIPPED
Start: 2021-06-22 | End: 2021-08-12 | Stop reason: CLARIF

## 2021-07-08 ENCOUNTER — OFFICE VISIT (OUTPATIENT)
Dept: FAMILY MEDICINE CLINIC | Age: 61
End: 2021-07-08
Payer: COMMERCIAL

## 2021-07-08 VITALS
WEIGHT: 259 LBS | BODY MASS INDEX: 36.26 KG/M2 | RESPIRATION RATE: 18 BRPM | HEIGHT: 71 IN | HEART RATE: 76 BPM | TEMPERATURE: 97.1 F | DIASTOLIC BLOOD PRESSURE: 75 MMHG | SYSTOLIC BLOOD PRESSURE: 124 MMHG

## 2021-07-08 DIAGNOSIS — E78.2 MIXED HYPERLIPIDEMIA: ICD-10-CM

## 2021-07-08 DIAGNOSIS — R73.01 IMPAIRED FASTING GLUCOSE: ICD-10-CM

## 2021-07-08 DIAGNOSIS — C61 PROSTATE CANCER METASTATIC TO PELVIS (HCC): ICD-10-CM

## 2021-07-08 DIAGNOSIS — B35.1 ONYCHOMYCOSIS: ICD-10-CM

## 2021-07-08 DIAGNOSIS — I10 ESSENTIAL HYPERTENSION: Primary | ICD-10-CM

## 2021-07-08 DIAGNOSIS — E55.9 VITAMIN D DEFICIENCY: ICD-10-CM

## 2021-07-08 DIAGNOSIS — R06.09 DYSPNEA ON EXERTION: ICD-10-CM

## 2021-07-08 DIAGNOSIS — I10 ESSENTIAL HYPERTENSION: ICD-10-CM

## 2021-07-08 DIAGNOSIS — C79.89 PROSTATE CANCER METASTATIC TO PELVIS (HCC): ICD-10-CM

## 2021-07-08 LAB
ALBUMIN SERPL-MCNC: 4.2 G/DL (ref 3.5–5.2)
ALP BLD-CCNC: 96 U/L (ref 40–129)
ALT SERPL-CCNC: 29 U/L (ref 0–40)
ANION GAP SERPL CALCULATED.3IONS-SCNC: 12 MMOL/L (ref 7–16)
AST SERPL-CCNC: 27 U/L (ref 0–39)
BILIRUB SERPL-MCNC: 0.5 MG/DL (ref 0–1.2)
BUN BLDV-MCNC: 14 MG/DL (ref 6–20)
CALCIUM SERPL-MCNC: 9.5 MG/DL (ref 8.6–10.2)
CHLORIDE BLD-SCNC: 105 MMOL/L (ref 98–107)
CHOLESTEROL, TOTAL: 165 MG/DL (ref 0–199)
CO2: 24 MMOL/L (ref 22–29)
CREAT SERPL-MCNC: 0.8 MG/DL (ref 0.7–1.2)
GFR AFRICAN AMERICAN: >60
GFR NON-AFRICAN AMERICAN: >60 ML/MIN/1.73
GLUCOSE BLD-MCNC: 115 MG/DL (ref 74–99)
HBA1C MFR BLD: 5.4 % (ref 4–5.6)
HDLC SERPL-MCNC: 42 MG/DL
LDL CHOLESTEROL CALCULATED: 98 MG/DL (ref 0–99)
POTASSIUM SERPL-SCNC: 4.2 MMOL/L (ref 3.5–5)
SODIUM BLD-SCNC: 141 MMOL/L (ref 132–146)
TOTAL PROTEIN: 7.1 G/DL (ref 6.4–8.3)
TRIGL SERPL-MCNC: 126 MG/DL (ref 0–149)
VITAMIN D 25-HYDROXY: 44 NG/ML (ref 30–100)
VLDLC SERPL CALC-MCNC: 25 MG/DL

## 2021-07-08 PROCEDURE — G8427 DOCREV CUR MEDS BY ELIG CLIN: HCPCS | Performed by: FAMILY MEDICINE

## 2021-07-08 PROCEDURE — 3017F COLORECTAL CA SCREEN DOC REV: CPT | Performed by: FAMILY MEDICINE

## 2021-07-08 PROCEDURE — G8417 CALC BMI ABV UP PARAM F/U: HCPCS | Performed by: FAMILY MEDICINE

## 2021-07-08 PROCEDURE — 1036F TOBACCO NON-USER: CPT | Performed by: FAMILY MEDICINE

## 2021-07-08 PROCEDURE — 99214 OFFICE O/P EST MOD 30 MIN: CPT | Performed by: FAMILY MEDICINE

## 2021-07-08 SDOH — ECONOMIC STABILITY: FOOD INSECURITY: WITHIN THE PAST 12 MONTHS, THE FOOD YOU BOUGHT JUST DIDN'T LAST AND YOU DIDN'T HAVE MONEY TO GET MORE.: NEVER TRUE

## 2021-07-08 SDOH — ECONOMIC STABILITY: FOOD INSECURITY: WITHIN THE PAST 12 MONTHS, YOU WORRIED THAT YOUR FOOD WOULD RUN OUT BEFORE YOU GOT MONEY TO BUY MORE.: NEVER TRUE

## 2021-07-08 ASSESSMENT — ENCOUNTER SYMPTOMS
CHEST TIGHTNESS: 0
COUGH: 0
CONSTIPATION: 0
DIARRHEA: 0
NAUSEA: 0
BACK PAIN: 0
SHORTNESS OF BREATH: 1
RHINORRHEA: 0
EYE PAIN: 0
EYE ITCHING: 0
SORE THROAT: 0
ABDOMINAL PAIN: 0

## 2021-07-08 ASSESSMENT — SOCIAL DETERMINANTS OF HEALTH (SDOH): HOW HARD IS IT FOR YOU TO PAY FOR THE VERY BASICS LIKE FOOD, HOUSING, MEDICAL CARE, AND HEATING?: SOMEWHAT HARD

## 2021-07-08 NOTE — PROGRESS NOTES
David Dominguez  : 1960    Chief Complaint:     Chief Complaint   Patient presents with    Hypertension    Nail Problem       HPI  David Dominguez 64 y.o. presents for   Chief Complaint   Patient presents with    Hypertension    Nail Problem     Patient presents for follow-up today. He states he has been doing well overall. However he has noticed some increased dyspnea on exertion. He also notes a discoloration of his toenails. The dyspnea on exertion does not occur all the time. He has no chest pain with it. He does notice when he goes upstairs he is more short of breath. He has not been walking as much and exercising as much. He does continue to follow with urology for prostate CA. Last PSA was within normal limits. He is due for blood work. All questions were answered to patients satisfaction. Past Medical History:   Diagnosis Date    Acute drug-induced gout of left foot 2015    Had gout attack on 7/18/15. Seems to be HCTZ induced.       ADHD (attention deficit hyperactivity disorder)     Back pain, chronic     Depression     Hyperlipidemia     Hypertension     Left elbow fracture 10/27/2015    MRSA (methicillin resistant staph aureus) culture positive     Seasonal allergies        Past Surgical History:   Procedure Laterality Date    APPENDECTOMY  1972    COLECTOMY Bilateral 2019    COLONOSCOPY  2013    sigmoid diverticulosis, Dr. Melvin Morrison, [facility] COLONOSCOPY  2013    ENDOSCOPIC ULTRASOUND (LOWER) Bilateral 2019    upper    FOOT SURGERY      RIGHT DUE TO FX    LUMBAR DISCECTOMY      [facility]     OTHER SURGICAL HISTORY  2018    removal and replacement of spinal cord stimulator    [place] IMPLANT NEUROSTIM/ N/A 3/28/2018    REMOVAL OF T7 - T8 MALFUCTIONING  STIMULATOR, REPLACEMENT OF T7- T8 SPINAL CORD STIMULATOR --CASTILLO MURILLO, [employer] performed by González Rosenbaum MD at [place] [id number]   Disease Mother     High Blood Pressure Mother     Asthma Mother     Heart Failure Father         CABG at 63's    Hypertension Father     Diabetes Father     Heart Disease Father     High Blood Pressure Father           Current Outpatient Medications   Medication Sig Dispense Refill    ciclopirox (PENLAC) 8 % solution Apply topically nightly. 1 Bottle 2    hydrALAZINE (APRESOLINE) 10 MG tablet Take 1 tablet by mouth 3 times daily 90 tablet 3    metoprolol succinate (TOPROL XL) 50 MG extended release tablet Take 1 tablet by mouth 2 times daily 60 tablet 3    aspirin EC 81 MG EC tablet Take 1 tablet by mouth daily 90 tablet 1    famotidine (PEPCID) 40 MG tablet Take 1 tablet by mouth every evening 90 tablet 1    atorvastatin (LIPITOR) 10 MG tablet TAKE ONE TABLET BY MOUTH EVERY DAY 90 tablet 1    loratadine (CLARITIN) 10 MG tablet Take 1 tablet by mouth daily 90 tablet 1    Probiotic Product (ADVANCED PROBIOTIC 10) CAPS Take 1 capsule by mouth daily 90 capsule 1    calcium carbonate (CALCIUM 600) 600 MG TABS tablet Take 1 tablet by mouth daily 90 tablet 1    Cholecalciferol (VITAMIN D3) 50 MCG (2000 UT) CAPS Take one tab po daily 90 capsule 1    losartan (COZAAR) 100 MG tablet TAKE ONE TABLET BY MOUTH EVERY DAY 90 tablet 1    clobetasol (TEMOVATE) 0.05 % ointment Apply topically 2 times daily.  60 g 2    leuprolide acetate, 4 Month, (ELIGARD) 30 MG injection Inject into the skin every 6 months      ibuprofen (ADVIL;MOTRIN) 800 MG tablet Take 1 tablet by mouth every 8 hours as needed for Pain 90 tablet 3    XTANDI 40 MG capsule       tamsulosin (FLOMAX) 0.4 MG capsule       ketorolac (ACULAR) 0.5 % ophthalmic solution Place 1 drop into both eyes 4 times daily 1 Bottle 0    fluticasone (FLONASE) 50 MCG/ACT nasal spray PLACE 1 OR 2 SPRAYS INTO EACH NOSTRIL ONCE DAILY AS DIRECTED 16 g 2    EPINEPHrine (EPIPEN 2-EVANGELISTA) 0.3 MG/0.3ML SOAJ injection Inject 0.3 mLs into the muscle as needed (bee) Use as directed for allergic reaction 1 each 1     MG capsule TAKE ONE CAPSULE BY MOUTH ONCE DAILY AS NEEDED FOR CONSTIPATION 30 capsule 0    triamcinolone (KENALOG) 0.1 % cream Apply topically 2 times daily. 45 g 0    desonide (DESOWEN) 0.05 % cream Apply to affected area on face and neck twice daily, 5 days on 2 days off, as needed.  fluocinonide (LIDEX) 0.05 % cream Apply to affected area twice daily, 5 days on 2 days off, as needed. Not for face, armpits, or groin.  Psyllium (METAMUCIL FIBER PO) Take by mouth      amphetamine-dextroamphetamine (ADDERALL) 30 MG tablet Take 30 mg by mouth daily. Raynold Glad lisdexamfetamine (VYVANSE) 70 MG capsule Take 1 capsule by mouth daily.  sildenafil (REVATIO) 20 MG tablet Take 2 or 3 tabs daily 1 hour (range: 30 minutes to 4 hours) before sexual activity as needed. Maximum 100 mg daily 60 tablet 0    mineral oil-hydrophilic petrolatum (AQUAPHOR) ointment Apply topically as needed. 396 g 3     No current facility-administered medications for this visit. Allergies   Allergen Reactions    Bee Venom     Dye [Iodides] Other (See Comments)     Patient reports father had an anaphylactic reaction to iodide which result in death    Iodine Solution [Povidone Iodine] Rash     TOPICAL AND INJECTED    Sulfa Antibiotics Rash    Topical Sulfur Rash       Health Maintenance Due   Topic Date Due    Shingles Vaccine (1 of 2) Never done   [employer] Visit (AWV)  04/11/2021           REVIEW OF SYSTEMS  Review of Systems   Constitutional: Negative for chills, fatigue and fever. HENT: Negative for congestion, ear pain, postnasal drip, rhinorrhea and sore throat. Eyes: Negative for pain and itching. Respiratory: Positive for shortness of breath. Negative for cough and chest tightness. Cardiovascular: Negative for chest pain and leg swelling. Gastrointestinal: Negative for abdominal pain, constipation, diarrhea and nausea.    Endocrine: Negative for heat intolerance. Genitourinary: Negative for flank pain, frequency and urgency. Musculoskeletal: Negative for arthralgias and back pain. Skin: Negative for pallor and rash. Discoloration of nails   Allergic/Immunologic: Negative for environmental allergies. Neurological: Negative for dizziness, numbness and headaches. Hematological: Does not bruise/bleed easily. Psychiatric/Behavioral: Negative for agitation, behavioral problems and hallucinations. PHYSICAL EXAM  /75   Pulse 76   Temp 97.1 °F (36.2 °C)   Resp 18   Ht 5' 11\" (1.803 m)   Wt 259 lb (117.5 kg)   BMI 36.12 kg/m²   Physical Exam  Vitals and nursing note reviewed. Constitutional:       General: He is not in acute distress. Appearance: He is well-developed. He is not diaphoretic. HENT:      Head: Normocephalic and atraumatic. Right Ear: External ear normal.      Left Ear: External ear normal.      Nose: Nose normal.      Mouth/Throat:      Pharynx: No oropharyngeal exudate. Eyes:      General:         Right eye: No discharge. Left eye: No discharge. Conjunctiva/sclera: Conjunctivae normal.   Neck:      Thyroid: No thyromegaly. Cardiovascular:      Rate and Rhythm: Normal rate and regular rhythm. Pulmonary:      Effort: Pulmonary effort is normal.      Breath sounds: Normal breath sounds. No wheezing. Abdominal:      Palpations: Abdomen is soft. Tenderness: There is no abdominal tenderness. Musculoskeletal:         General: No tenderness. Normal range of motion. Cervical back: Normal range of motion and neck supple. Lymphadenopathy:      Cervical: No cervical adenopathy. Skin:     General: Skin is warm and dry. Findings: No erythema. Neurological:      Mental Status: He is alert and oriented to person, place, and time. Coordination: Coordination normal.      Deep Tendon Reflexes: Reflexes are normal and symmetric.    Psychiatric:         Behavior: Behavior normal.         Thought Content: Thought content normal.         Judgment: Judgment normal.              Laboratory: All laboratory and radiology results have been personally reviewed by myself    Lab Results   Component Value Date     01/18/2021    K 4.2 01/18/2021    K 4.2 03/26/2018     01/18/2021    CO2 27 01/18/2021    BUN 12 01/18/2021    CREATININE 0.9 01/18/2021    PROT 7.7 01/18/2021    LABALBU 4.4 01/18/2021    LABALBU 4.4 08/01/2011    CALCIUM 9.6 01/18/2021    GFRAA >60 01/18/2021    LABGLOM >60 01/18/2021    GLUCOSE 98 01/18/2021    GLUCOSE 59 08/01/2011    AST 20 01/18/2021    ALT 25 01/18/2021    ALKPHOS 113 01/18/2021    BILITOT 0.4 01/18/2021    TSH 2.640 04/18/2019    VITD25 46 04/18/2019    CHOL 167 01/18/2021    TRIG 157 01/18/2021    HDL 61 01/18/2021    LDLCALC 75 01/18/2021    LABA1C 5.3 10/17/2017        Lab Results   Component Value Date    CHOL 167 01/18/2021    CHOL 210 (H) 04/18/2019    CHOL 152 08/27/2018     Lab Results   Component Value Date    TRIG 157 (H) 01/18/2021    TRIG 207 (H) 04/18/2019    TRIG 156 (H) 08/27/2018     Lab Results   Component Value Date    HDL 61 01/18/2021    HDL 37 04/18/2019    HDL 42 08/27/2018     Lab Results   Component Value Date    LDLCALC 75 01/18/2021    LDLCALC 132 (H) 04/18/2019    LDLCALC 79 08/27/2018       Lab Results   Component Value Date    LABA1C 5.3 10/17/2017    LABA1C 5.0 02/08/2011     Lab Results   Component Value Date    LDLCALC 75 01/18/2021    CREATININE 0.9 01/18/2021       ASSESSMENT/PLAN:     Diagnosis Orders   1. Essential hypertension  COMPREHENSIVE METABOLIC PANEL   2. Mixed hyperlipidemia  Lipid Panel   3. Onychomycosis     4. Vitamin D deficiency  Vitamin D 25 Hydroxy   5. Prostate cancer metastatic to pelvis ([address]     6. Impaired fasting glucose  HEMOGLOBIN A1C   7. Dyspnea on exertion  Cardiac Stress Test Exercise - Treadmill     BP well controlled continue current therapy.   We will continue cholesterol medication. Will repeat lipid panel and CMP as above. As for onychomycosis will start Penlac. Side effects medication reviewed with patient. As for prostate CA continue to follow with urology as recommended. As for dyspnea due to continued shortness of breath will obtain stress test to further evaluate. He does not wish for nuclear stress test.    Problem list reviewed andsimplified/updated  HM reviewed today and counseled as appropriate    Call or go to ED immediately if symptoms worsen or persist.  No future appointments. Or sooner if necessary. Educational materials and/or homeexercises printed for patient's review and were included in patient instructions on his/her After Visit Summary and given to patient at the end of visit.       Counseled regarding above diagnosis, including possible risks and complications,  especially if left uncontrolled.     Counseled regarding the possible side effects, risks, benefits and alternatives to treatment; patient and/or guardian verbalizes understanding, agrees,feels comfortable with and wishes to proceed with above treatment plan.     Advised patient to call Debby Schwab new medication issues, and read all Rx info from pharmacy to assure aware of all possible risks and side effects of medication before taking.     Reviewed age and gender appropriate health screening exams and vaccinations. Advised patient regarding importance of keeping up with recommended health maintenance and toschedule as soon as possible if overdue, as this is important in assessing for undiagnosed pathology, especially cancer, as well as protecting against potentially harmful/life threatening disease.          Patient and/or guardian verbalizes understanding and agrees with above counseling, assessment and plan.     All questions answered. Mohit 5, DO  7/8/21    NOTE: This report was transcribed using voice recognition software.  Every effort was made to ensure accuracy; however, inadvertent computerized transcription errors may be present

## 2021-07-28 ENCOUNTER — TELEPHONE (OUTPATIENT)
Dept: CARDIOLOGY | Age: 61
End: 2021-07-28

## 2021-07-28 NOTE — TELEPHONE ENCOUNTER
SCHEDULED TREADMILL STRESS TEST FOR 08-05-21. REVIEWED COVID CHECKLIST WITH PATIENT.     Electronically signed by Dionne Argueta on 7/28/2021 at 2:38 PM

## 2021-08-03 ENCOUNTER — TELEPHONE (OUTPATIENT)
Dept: CARDIOLOGY | Age: 61
End: 2021-08-03

## 2021-08-03 NOTE — TELEPHONE ENCOUNTER
Spoke to the patient on the phone. Reminded of his 1:00 pm stress test  At [facility]. He was instructed that he may eat a light breakfast but to avoid caffeine products for 12 hours prior. He was also instructed he make take his meds with a sip of water but to avoid his Toprol for 6 hours prior as well as he will hold his Vyvanse.  He verbalized an understanding

## 2021-08-05 ENCOUNTER — HOSPITAL ENCOUNTER (OUTPATIENT)
Dept: CARDIOLOGY | Age: 61
Discharge: HOME | End: 2021-08-05
Payer: COMMERCIAL

## 2021-08-05 VITALS
WEIGHT: 255 LBS | SYSTOLIC BLOOD PRESSURE: 140 MMHG | BODY MASS INDEX: 35.7 KG/M2 | HEART RATE: 76 BPM | OXYGEN SATURATION: 97 % | DIASTOLIC BLOOD PRESSURE: 80 MMHG | HEIGHT: 71 IN

## 2021-08-05 DIAGNOSIS — R06.09 DYSPNEA ON EXERTION: ICD-10-CM

## 2021-08-05 PROCEDURE — 93017 CV STRESS TEST TRACING ONLY: CPT

## 2021-08-05 NOTE — PROCEDURES
[address] [facility],[place] [facility] - [address]  [phone]    Exercise Stress Study (non-nuclear)      Name: [address] Account Number:  [de-identified]      :  1960          Sex: male         Date of Study:  2021    Height: 5' 11\" (180.3 cm)          Weight: 255 lb (115.7 kg)    Ordering Provider: Susannah Munroe DO          PCP: Mohit Tiwari DO    Cardiologist: none               Interpreting Physician: Daily Jiménez MD     Indication:   Detecting the presence and location of coronary artery disease    Clinical History:   Patient has no known history of coronary artery disease. Resting ECG:    VA int .20 sec, QRS int . 10 sec, QT int . 40 sec; HR 76 bpm  Normal sinus rhythm    Exercise: The patient exercised using a [place] protocol, completing 6:17 minutes and reaching an estimated work load of 7.0 metabolic equivalents (METS). Resting HR was 76. Peak exercise heart rate was 140 ( 87% of maximum predicted heart rate for age). Baseline /80. Peak exercise /76. The blood pressure response to exercise was normal      Exercise was terminated due to dyspnea, fatigue and heart rate attained. The patient experienced chest heaviness at beginning of last stage but did not tell me until he sat down in recovery. the chest heaviness subsided at end of recovery . He also experienced moderate shortness of breath  with exercise. Pulse oximetry was used to monitor oxygen saturation during the stress test.  The study was performed on Room Air. The resting pulse oximeter was 97%. The lowest O2 saturation seen during exercise was 92 %. The average O2 saturation with exercise was 94 %. Exercise ECG:   The patient demonstrated occasional PVC's during exercise. With exercise, there were no ST segment changes of significance at the heart rate achieved.     Dong treadmill score was 2 implying intermediate risk. Impression:      1. Exercise EKG was negative. .  The patient experienced non limiting chest pain. with exercise. 2. Dong treadmill score was 2 implying intermediate risk. 3. Exercise capacity was average. 4. Intermediate risk general exercise treadmill test.    Thank you for sending your patient to this [facility].     Electronically signed by Shahnaz Herrera MD on 8/5/21 at 3:27 PM EDT

## 2021-08-06 ENCOUNTER — TELEPHONE (OUTPATIENT)
Dept: FAMILY MEDICINE CLINIC | Age: 61
End: 2021-08-06

## 2021-08-06 DIAGNOSIS — R06.09 DYSPNEA ON EXERTION: ICD-10-CM

## 2021-08-06 DIAGNOSIS — R07.9 CHEST PAIN, UNSPECIFIED TYPE: Primary | ICD-10-CM

## 2021-08-06 NOTE — TELEPHONE ENCOUNTER
Advised Pt of Results. Pt would like to discuss what intermediate risk means in a further discussion. Pt said he has been feeling ok, only problem after test was muscular from the hips down. Pt was agreeable to the referral to cardio.  [facility]

## 2021-08-09 ENCOUNTER — TELEPHONE (OUTPATIENT)
Dept: ADMINISTRATIVE | Age: 61
End: 2021-08-09

## 2021-08-09 NOTE — TELEPHONE ENCOUNTER
NP scheduled from the [facility]. Patient Appointment Form:      PCP: Dr. Sandra Vogt  Referring: Dr. Sandra Vogt    Has the Patient:    Seen a Cardiologist? Yes over 10 years ago - he does not remember who/where. Had a heart catheterization? No    Had heart surgery? No    Had a stress test or nuclear stress test? Yes 8/5/21 Epic     Had an echocardiogram? No    Had a vascular ultrasound? No    Had a 24/48 heart monitor or extended cardiac event monitor? No    Had recent blood work in the last 6 months? Yes 7/8/21 Epic PCP     Had a pacemaker/ICD/ILR implant? No    Seen an Electrophysiologist? No        Will send records via: All recs in Epic      Date & time of appointment: 8/12/21 @ 1:00 with Dr. Ziggy Linn.

## 2021-08-12 ENCOUNTER — OFFICE VISIT (OUTPATIENT)
Dept: CARDIOLOGY CLINIC | Age: 61
End: 2021-08-12
Payer: COMMERCIAL

## 2021-08-12 VITALS
DIASTOLIC BLOOD PRESSURE: 78 MMHG | HEART RATE: 79 BPM | HEIGHT: 71 IN | RESPIRATION RATE: 16 BRPM | SYSTOLIC BLOOD PRESSURE: 150 MMHG | WEIGHT: 260.4 LBS | BODY MASS INDEX: 36.46 KG/M2

## 2021-08-12 DIAGNOSIS — I10 ESSENTIAL HYPERTENSION: Primary | ICD-10-CM

## 2021-08-12 DIAGNOSIS — R06.09 DOE (DYSPNEA ON EXERTION): ICD-10-CM

## 2021-08-12 PROBLEM — M54.9 CHRONIC BACK PAIN: Status: RESOLVED | Noted: 2018-08-26 | Resolved: 2021-08-12

## 2021-08-12 PROBLEM — G89.29 CHRONIC BACK PAIN: Status: RESOLVED | Noted: 2018-08-26 | Resolved: 2021-08-12

## 2021-08-12 PROCEDURE — 99204 OFFICE O/P NEW MOD 45 MIN: CPT | Performed by: INTERNAL MEDICINE

## 2021-08-12 PROCEDURE — G8427 DOCREV CUR MEDS BY ELIG CLIN: HCPCS | Performed by: INTERNAL MEDICINE

## 2021-08-12 PROCEDURE — 1036F TOBACCO NON-USER: CPT | Performed by: INTERNAL MEDICINE

## 2021-08-12 PROCEDURE — G8417 CALC BMI ABV UP PARAM F/U: HCPCS | Performed by: INTERNAL MEDICINE

## 2021-08-12 PROCEDURE — 93000 ELECTROCARDIOGRAM COMPLETE: CPT | Performed by: INTERNAL MEDICINE

## 2021-08-12 PROCEDURE — 3017F COLORECTAL CA SCREEN DOC REV: CPT | Performed by: INTERNAL MEDICINE

## 2021-08-12 NOTE — PROGRESS NOTES
pills at once daily       tamsulosin (FLOMAX) 0.4 MG capsule       ketorolac (ACULAR) 0.5 % ophthalmic solution Place 1 drop into both eyes 4 times daily 1 Bottle 0    fluticasone (FLONASE) 50 MCG/ACT nasal spray PLACE 1 OR 2 SPRAYS INTO EACH NOSTRIL ONCE DAILY AS DIRECTED 16 g 2    EPINEPHrine (EPIPEN 2-EVANGELISTA) 0.3 MG/0.3ML SOAJ injection Inject 0.3 mLs into the muscle as needed (bee) Use as directed for allergic reaction 1 each 1     MG capsule TAKE ONE CAPSULE BY MOUTH ONCE DAILY AS NEEDED FOR CONSTIPATION 30 capsule 0    triamcinolone (KENALOG) 0.1 % cream Apply topically 2 times daily. 45 g 0    desonide (DESOWEN) 0.05 % cream Apply to affected area on face and neck twice daily, 5 days on 2 days off, as needed.  fluocinonide (LIDEX) 0.05 % cream Apply to affected area twice daily, 5 days on 2 days off, as needed. Not for face, armpits, or groin.  Psyllium (METAMUCIL FIBER PO) Take by mouth      amphetamine-dextroamphetamine (ADDERALL) 30 MG tablet Take 30 mg by mouth as needed. Indications: take 1 tablet in the afternoon       lisdexamfetamine (VYVANSE) 70 MG capsule Take 1 capsule by mouth daily.  mineral oil-hydrophilic petrolatum (AQUAPHOR) ointment Apply topically as needed. 396 g 3     No current facility-administered medications for this visit.         Allergies   Allergen Reactions    Bee Venom     Dye [Iodides] Other (See Comments)     Patient reports father had an anaphylactic reaction to iodide which result in death    Iodine Solution [Povidone Iodine] Rash     TOPICAL AND INJECTED    Sulfa Antibiotics Rash    Topical Sulfur Rash       Vitals:    08/12/21 1251   BP: (!) 150/78   Pulse: 79   Resp: 16   Weight: 260 lb 6.4 oz (118.1 kg)   Height: 5' 11\" (1.803 m)       Social History     Socioeconomic History    Marital status:      Spouse name: Not on file    Number of children: Not on file    Years of education: Not on file    Highest education level: Not on file   Occupational History    Occupation: retired    Tobacco Use    Smoking status: Never Smoker    Smokeless tobacco: Never Used   Vaping Use    Vaping Use: Never used   Substance and Sexual Activity    Alcohol use: Yes     Comment: rare    Drug use: Yes     Comment: used to do cocaine 2013    Sexual activity: Not Currently   Other Topics Concern    Not on file   Social History Narrative    Not on file     Social Determinants of Health     Financial Resource Strain: Medium Risk    Difficulty of Paying Living Expenses: Somewhat hard   Food Insecurity: No Food Insecurity    Worried About Running Out of Food in the Last Year: Never true    Antwon of Food in the Last Year: Never true   Transportation Needs:     Lack of Transportation (Medical):  Lack of Transportation (Non-Medical):    Physical Activity:     Days of Exercise per Week:     Minutes of Exercise per Session:    Stress:     Feeling of Stress :    Social Connections:     Frequency of Communication with Friends and Family:     Frequency of Social Gatherings with Friends and Family:     Attends Mandaeism Services:     Active Member of Clubs or Organizations:     Attends Club or Organization Meetings:     Marital Status:    Intimate Partner Violence:     Fear of Current or Ex-Partner:     Emotionally Abused:     Physically Abused:     Sexually Abused:        Family History   Problem Relation Age of Onset    Heart Failure Mother         CABG at 61     Heart Disease Mother     High Blood Pressure Mother     Asthma Mother     Heart Attack Mother     Heart Failure Father         CABG at 63's    Hypertension Father     Diabetes Father     Heart Disease Father     High Blood Pressure Father     Heart Attack Father     Heart Attack Sister     Heart Attack Brother          SUBJECTIVE: Liz Phalen presents to the office today for consult - dr Imani Lindsay - for cardiac evaluation.   He complains of dyspnea with exertion, and denies   chest pain, chest pressure/discomfort, claudication, exertional chest pressure/discomfort, irregular heart beat, lower extremity edema, near-syncope, orthopnea, palpitations, paroxysmal nocturnal dyspnea, syncope and tachypnea. Retired, does some chores, strolls around [place], has gained 25lbs more in last year after prostate CA diagnosis  Had ETT in investigation of RAVI      Review of Systems:   Heart: as above   Lungs: as above   Eyes: denies changes in vision or discharge. Ears: denies changes in hearing or pain. Nose: denies epistaxis or masses   Throat: denies sore throat or trouble swallowing. Neuro: denies numbness, tingling, tremors. Skin: denies rashes or itching. : denies hematuria, dysuria   GI: denies vomiting, diarrhea   Psych: denies mood changed, anxiety, depression. all others negative. Physical Exam   BP (!) 150/78   Pulse 79   Resp 16   Ht 5' 11\" (1.803 m)   Wt 260 lb 6.4 oz (118.1 kg)   BMI 36.32 kg/m²   Constitutional: Oriented to person, place, and time. Obese No distress. Head: Normocephalic and atraumatic. Eyes: EOM are normal. Pupils are equal, round, and reactive to light. Neck: Normal range of motion. Neck supple. No hepatojugular reflux and no JVD present. Carotid bruit is not present. No tracheal deviation present. No thyromegaly present. Cardiovascular: Normal rate, regular rhythm, normal heart sounds and intact distal pulses. Exam reveals no gallop and no friction rub. No murmur heard. Pulmonary/Chest: Effort normal and breath sounds normal. No respiratory distress. No wheezes. No rales. No tenderness. Abdominal: Soft. Bowel sounds are normal. No distension and no mass. No tenderness. No rebound and no guarding. Musculoskeletal: Normal range of motion. No edema and no tenderness. Neurological: Alert and oriented to person, place, and time. Skin: Skin is warm and dry. No rash noted. Not diaphoretic. No erythema.    Psychiatric: Normal mood and affect. Behavior is normal.     EKG:  normal sinus rhythm, rate 79, axis +2.     ASSESSMENT AND PLAN:  Patient Active Problem List   Diagnosis    Mixed hyperlipidemia    Depression (emotion)    Lumbar disc disease with radiculopathy    Seasonal allergies    Essential hypertension    Gastroesophageal reflux disease without esophagitis    Pulmonary nodule    Failed back syndrome    S/P insertion of brain-responsive neurostimulation device    Attention deficit hyperactivity disorder, predominantly inattentive type    Seasonal allergic rhinitis    RAVI (dyspnea on exertion)     Patient with RAVI and risk factors for CAD  Had ETT-R with non ischemic EKG response, but stress lab nurses describe limiting angina  Normal CV exam and ekg  Facing TURP  Will repeat study with nuclear imaging  Hopefully symptoms related to obesity and deconditioning  Risk assessment for OR TBD      Amna Henriquez M.D  [id number] [facility]

## 2021-08-13 ENCOUNTER — TELEPHONE (OUTPATIENT)
Dept: FAMILY MEDICINE CLINIC | Age: 61
End: 2021-08-13

## 2021-08-13 ENCOUNTER — OFFICE VISIT (OUTPATIENT)
Dept: FAMILY MEDICINE CLINIC | Age: 61
End: 2021-08-13
Payer: COMMERCIAL

## 2021-08-13 VITALS
DIASTOLIC BLOOD PRESSURE: 87 MMHG | SYSTOLIC BLOOD PRESSURE: 154 MMHG | BODY MASS INDEX: 36.68 KG/M2 | RESPIRATION RATE: 20 BRPM | HEIGHT: 71 IN | TEMPERATURE: 97.2 F | HEART RATE: 77 BPM | WEIGHT: 262 LBS

## 2021-08-13 DIAGNOSIS — Z01.818 PRE-OP EVALUATION: Primary | ICD-10-CM

## 2021-08-13 DIAGNOSIS — C61 PROSTATE CANCER METASTATIC TO PELVIS (HCC): ICD-10-CM

## 2021-08-13 DIAGNOSIS — C79.89 PROSTATE CANCER METASTATIC TO PELVIS (HCC): ICD-10-CM

## 2021-08-13 DIAGNOSIS — I10 ESSENTIAL HYPERTENSION: ICD-10-CM

## 2021-08-13 DIAGNOSIS — R07.9 CHEST PAIN, UNSPECIFIED TYPE: ICD-10-CM

## 2021-08-13 DIAGNOSIS — R06.09 DYSPNEA ON EXERTION: ICD-10-CM

## 2021-08-13 PROCEDURE — 1036F TOBACCO NON-USER: CPT | Performed by: FAMILY MEDICINE

## 2021-08-13 PROCEDURE — G8427 DOCREV CUR MEDS BY ELIG CLIN: HCPCS | Performed by: FAMILY MEDICINE

## 2021-08-13 PROCEDURE — 99214 OFFICE O/P EST MOD 30 MIN: CPT | Performed by: FAMILY MEDICINE

## 2021-08-13 PROCEDURE — 3017F COLORECTAL CA SCREEN DOC REV: CPT | Performed by: FAMILY MEDICINE

## 2021-08-13 PROCEDURE — G8417 CALC BMI ABV UP PARAM F/U: HCPCS | Performed by: FAMILY MEDICINE

## 2021-08-13 ASSESSMENT — ENCOUNTER SYMPTOMS
ABDOMINAL PAIN: 0
RHINORRHEA: 0
COUGH: 0
CHEST TIGHTNESS: 0
EYE PAIN: 0
EYE ITCHING: 0
DIARRHEA: 0
SHORTNESS OF BREATH: 1
NAUSEA: 0
CONSTIPATION: 0
SORE THROAT: 0
BACK PAIN: 0

## 2021-08-13 NOTE — PROGRESS NOTES
SUBJECTIVE:  Pancho Medina is a 63 y.o. male who presents for a PRE-OPERATIVE PHYSICAL at the request of Dr. Nam Patiño in anticipation of TURP which is scheduled on 8/30/21. We discussed the following issues:     1) Denies problems with anesthesia or bleeding. Patient had stress test done and was at intermediate risk - he did see cardiology and nuclear stress was ordered. I have personally reviewed the nursing note and triage note for this patient. I also reviewed the visit navigator for clinical datarelevant to this visit. Past medical history, social and surgical history, family history, medications and allergies all reviewed and updated in the chart today. Race and ethnicity documented in EHR verified with patient,see demographics for further details.     FUNCTIONAL CAPACITY (Bolded line indicates patient's functional capacity level):  1-3        Watching television  Eating, dressing,cooking, using the toilet  Walking one or two blocks on level ground at 2-3 miles per hour  Doing light housework (ex dusting)    4-10        Climbing a flight of stairs     Walking on level ground at 4miles per hour  Running a short distance  Doing heavy chores around the house (ex scrubbing floors, lifting furniture)  Playing moderately strenuous sports (ex golf, dance, bowling)    >10    Playingstrenuous sports (ex tennis, basketball)    taken from AFP Volume 85, Number 3, p 241    RISK OF MAJOR CARDIAC COMPLICATIONS FOLLOWING NONCARDIAC SURGERY (bolded applies tothis patient)    Assign one point for each of the following six risk factors:  1 point high risk surgical procedure:   (intraperitoneal, intrathoracic, suprainguinal vascular)   1 point history of MI   history of positive stress test  current chest pain due to myocardial ischemia  current nitrate treatment  Q waves   1 point history of CHF  history of pulmonary edema  history of paroxysmal nocturnal dyspnea  current BL rales  current S3 gallop  current CXR with pulmonary congestion   1 point history of cerebrovascular disease (CVA or TIA)   1 point preoperative tx with insulin   1 point preoperative creatinine >2     Score  Risk Index class Risk of major cardiac complications (%)  0  I   0.4  1  II   0.9  2  III   6.6  3 or more IV   11.0    taken from AFP Volume 85, Number3, p 241      SURGICAL RISK CATEGORY (Bolded line indicates patient's category):  High   Cardiac risk >5% Aortic or other major vascular surgery        Peripheral vascular surgery  Intermediate  Cardiac risk 1-5% Carotid enterectomy        Head and neck surgery        Intraperitoneal or intrathoracic surgery        Orthopedic surgery        Prostate surgery  Low   Cardiac risk <1% Superficial procedures        Breast surgery        Cataract surgery        Endoscopic procedures        Most ambulatory surgeries    taken from  AFP Volume 85,Number 3, p 241    ROS   Review of Systems   Constitutional: Negative for chills, fatigue and fever. HENT: Negative for congestion, ear pain, postnasal drip, rhinorrhea and sore throat. Eyes: Negative for pain and itching. Respiratory: Positive for shortness of breath. Negative for cough and chest tightness. Cardiovascular: Negative for chest pain and leg swelling. Gastrointestinal: Negative for abdominal pain, constipation, diarrhea and nausea. Endocrine: Negative for heat intolerance. Genitourinary: Negative for flank pain, frequency and urgency. Musculoskeletal: Negative for arthralgias and back pain. Skin: Negative for pallor and rash. Discoloration of nails   Allergic/Immunologic: Negative for environmental allergies. Neurological: Negative for dizziness, numbness and headaches. Hematological: Does not bruise/bleed easily. Psychiatric/Behavioral: Negative for agitation, behavioral problems and hallucinations.        PAST MEDICAL HISTORY:  Patient Active Problem List   Diagnosis    Mixed hyperlipidemia    Depression (emotion)  Lumbar disc disease with radiculopathy    Seasonal allergies    Essential hypertension    Gastroesophageal reflux disease without esophagitis    Pulmonary nodule    Failed back syndrome    S/P insertion of brain-responsive neurostimulation device    Attention deficit hyperactivity disorder, predominantly inattentive type    Seasonal allergic rhinitis    RAVI (dyspnea on exertion)       PAST SURGICAL HISTORY:  Past Surgical History:   Procedure Laterality Date    APPENDECTOMY  1972    COLECTOMY Bilateral 02/2019    COLONOSCOPY  2/1/2013    sigmoid diverticulosis, Dr. Kavon Mayes, [facility] COLONOSCOPY  02/01/2013    ENDOSCOPIC ULTRASOUND (LOWER) Bilateral 02/2019    upper    FOOT SURGERY  1990s    RIGHT DUE TO FX    LUM[facility]Y  2007    [facility]     OTHER SURGICAL HISTORY  03/28/2018    removal and replacement of spinal cord stimulator    MT IMPLANT NEUROSTIM/ N/A 3/28/2018    REMOVAL OF T7 - T8 MALFUCTIONING  STIMULATOR, REPLACEMENT OF T7- T8 SPINAL CORD STIMULATOR --[employer], [email or web address] performed by Princess Tong MD at [place] [address]  2009    insertion of spinal cord stimulator and since then has had 8 different surgeries to take it out and put it in due to MRSA infections    SPINE SURGERY  10/11/2011    INSERTION OF SPINAL CORD STIMULATOR    [address]   6/2012    change of spinal cord stimulator, Dr. Drea Bolaños, [address]:  Outpatient Medications Marked as Taking for the 8/13/21 encounter (Office Visit) with [address], DO   Medication Sig Dispense Refill    METOPROLOL SUCCINATE PO Take 75 mg by mouth daily Indications: taking 25 mg in am, and 50 mg in pm      ciclopirox (PENLAC) 8 % solution Apply topically nightly.  1 Bottle 2    hydrALAZINE (APRESOLINE) 10 MG tablet Take 1 tablet by mouth 3 times daily 90 tablet 3    aspirin EC 81 MG EC tablet Take 1 tablet by mouth daily 90 tablet 1    famotidine (PEPCID) 40 MG tablet Take 1 tablet by mouth every evening 90 tablet 1    atorvastatin (LIPITOR) 10 MG tablet TAKE ONE TABLET BY MOUTH EVERY DAY 90 tablet 1    loratadine (CLARITIN) 10 MG tablet Take 1 tablet by mouth daily 90 tablet 1    calcium carbonate (CALCIUM 600) 600 MG TABS tablet Take 1 tablet by mouth daily 90 tablet 1    Cholecalciferol (VITAMIN D3) 50 MCG (2000 UT) CAPS Take one tab po daily 90 capsule 1    losartan (COZAAR) 100 MG tablet TAKE ONE TABLET BY MOUTH EVERY DAY 90 tablet 1    clobetasol (TEMOVATE) 0.05 % ointment Apply topically 2 times daily. 60 g 2    leuprolide acetate, 4 Month, (ELIGARD) 30 MG injection Inject into the skin every 6 months      ibuprofen (ADVIL;MOTRIN) 800 MG tablet Take 1 tablet by mouth every 8 hours as needed for Pain 90 tablet 3    XTANDI 40 MG capsule Indications: taking 4 pills at once daily       tamsulosin (FLOMAX) 0.4 MG capsule       ketorolac (ACULAR) 0.5 % ophthalmic solution Place 1 drop into both eyes 4 times daily 1 Bottle 0    fluticasone (FLONASE) 50 MCG/ACT nasal spray PLACE 1 OR 2 SPRAYS INTO EACH NOSTRIL ONCE DAILY AS DIRECTED 16 g 2    EPINEPHrine (EPIPEN 2-EVANGELISTA) 0.3 MG/0.3ML SOAJ injection Inject 0.3 mLs into the muscle as needed (bee) Use as directed for allergic reaction 1 each 1     MG capsule TAKE ONE CAPSULE BY MOUTH ONCE DAILY AS NEEDED FOR CONSTIPATION 30 capsule 0    triamcinolone (KENALOG) 0.1 % cream Apply topically 2 times daily. 45 g 0    desonide (DESOWEN) 0.05 % cream Apply to affected area on face and neck twice daily, 5 days on 2 days off, as needed.  fluocinonide (LIDEX) 0.05 % cream Apply to affected area twice daily, 5 days on 2 days off, as needed. Not for face, armpits, or groin.  Psyllium (METAMUCIL FIBER PO) Take by mouth      amphetamine-dextroamphetamine (ADDERALL) 30 MG tablet Take 30 mg by mouth as needed.  Indications: take 1 tablet in the afternoon       lisdexamfetamine (VYVANSE) 70 MG capsule Take 1 capsule by mouth daily.  mineral oil-hydrophilic petrolatum (AQUAPHOR) ointment Apply topically as needed.  396 g 3       ALLERGIES:  Allergies   Allergen Reactions    Bee Venom     Dye [Iodides] Other (See Comments)     Patient reports father had an anaphylactic reaction to iodide which result in death    Iodine Solution [Povidone Iodine] Rash     TOPICAL AND INJECTED    Sulfa Antibiotics Rash    Topical Sulfur Rash       SOCIAL HISTORY:  Social History     Tobacco Use    Smoking status: Never Smoker    Smokeless tobacco: Never Used   Substance Use Topics    Alcohol use: Yes     Comment: rare       FAMILY HISTORY:  Family History   Problem Relation Age of Onset    Heart Failure Mother         CABG at 61     Heart Disease Mother     High Blood Pressure Mother     Asthma Mother     Heart Attack Mother     Heart Failure Father         CABG at 63's    Hypertension Father     Diabetes Father     Heart Disease Father     High Blood Pressure Father     Heart Attack Father     Heart Attack Sister     Heart Attack Brother        IMMUNIZATIONS:  Immunization History   Administered Date(s) Administered    COVID-19, Moderna, PF, 100mcg/0.5mL 03/24/2021, 04/22/2021    Influenza Vaccine, unspecified formulation 10/30/2012    Influenza Virus Vaccine 10/30/2012, 11/20/2013, 10/14/2015, 09/26/2018, 11/20/2019    Influenza, MDCK Quadv, IM, PF (Flucelvax 4 yrs and older) 10/01/2018    Influenza, MDCK Quadv, with preserv IM (Flucelvax 4 yrs and older) 10/17/2017    Influenza, Quadv, IM, (6 mo and older Fluzone, Flulaval, Fluarix and 3 yrs and older Afluria) 11/02/2016    Influenza, Quadv, IM, PF (6 mo and older Fluzone, Flulaval, Fluarix, and 3 yrs and older Afluria) 10/02/2019, 10/15/2020    PPD Test 02/24/2017    Pneumococcal Polysaccharide ([id number]) 10/15/2020    Tdap (Boostrix, Adacel) 04/28/2015       OBJECTIVE/PYSICAL EXAMINATION:  BP (!) 154/87   Pulse 77   Temp 97.2 °F (36.2 °C) Resp 20   Ht 5' 11\" (1.803 m)   Wt 262 lb (118.8 kg)   BMI 36.54 kg/m²    Physical Exam  Vitals and nursing note reviewed. Constitutional:       General: He is not in acute distress. Appearance: He is well-developed. He is not diaphoretic. HENT:      Head: Normocephalic and atraumatic. Right Ear: External ear normal.      Left Ear: External ear normal.      Nose: Nose normal.      Mouth/Throat:      Pharynx: No oropharyngeal exudate. Eyes:      General:         Right eye: No discharge. Left eye: No discharge. Conjunctiva/sclera: Conjunctivae normal.   Neck:      Thyroid: No thyromegaly. Cardiovascular:      Rate and Rhythm: Normal rate and regular rhythm. Pulmonary:      Effort: Pulmonary effort is normal.      Breath sounds: Normal breath sounds. No wheezing. Abdominal:      Palpations: Abdomen is soft. Tenderness: There is no abdominal tenderness. Musculoskeletal:         General: No tenderness. Normal range of motion. Cervical back: Normal range of motion and neck supple. Lymphadenopathy:      Cervical: No cervical adenopathy. Skin:     General: Skin is warm and dry. Findings: No erythema. Neurological:      Mental Status: He is alert and oriented to person, place, and time. Coordination: Coordination normal.      Deep Tendon Reflexes: Reflexes are normal and symmetric. Psychiatric:         Behavior: Behavior normal.         Thought Content: Thought content normal.         Judgment: Judgment normal.         ASSESSMENT/PLAN:     Diagnosis Orders   1. Pre-op evaluation     2. Chest pain, unspecified type     3. Dyspnea on exertion     4. Prostate cancer metastatic to pelvis ([address]     5. Essential hypertension       Patient does have nuclear medicine study scheduled for next week possibly. Will need results of that and if within normal limits he may proceed with surgery. Will addend note after completion of nuclear medicine study.     HM reviewed today and updated as appropriate  Call or go to ED immediately if symptoms worsen or persist.  No future appointments. Or sooner if necessary. Counseled regarding above diagnosis, including possible risks and complications,  especially if left uncontrolled. Counseled regarding the possible side effects, risks, benefits and alternatives to treatment; patient and/or guardian verbalizes understanding. Advised patient to call with any new medication issues. All questions answered.     Mohit 5, DO  8/13/2021

## 2021-08-19 ENCOUNTER — TELEPHONE (OUTPATIENT)
Dept: CARDIOLOGY | Age: 61
End: 2021-08-19

## 2021-08-19 NOTE — TELEPHONE ENCOUNTER
SCHEDULED STRESS TEST FOR 08-20-21. REVIEWED COVID CHECKLIST WITH PATIENT.       Electronically signed by Kelsi Ponce on 8/19/2021 at 8:52 AM

## 2021-08-20 ENCOUNTER — TELEPHONE (OUTPATIENT)
Dept: CARDIOLOGY | Age: 61
End: 2021-08-20

## 2021-08-20 NOTE — TELEPHONE ENCOUNTER
CALLED PATIENT AND LEFT MESSAGE TO RESCHEDULE NUC STRESS TEST THAT WAS SCHEDULED FOR THIS MORNING. PATIENT WAS A NO SHOW.     Electronically signed by Na Can on 8/20/2021 at 9:48 AM

## 2021-08-25 ENCOUNTER — TELEPHONE (OUTPATIENT)
Dept: CARDIOLOGY | Age: 61
End: 2021-08-25

## 2021-08-25 NOTE — TELEPHONE ENCOUNTER
Spoke with patient and confirmed nuclear stress test on August 27, 2021 at 0700. Instructions for test and COVID-19 preprocedure checklist reviewed with patient.

## 2021-08-27 ENCOUNTER — HOSPITAL ENCOUNTER (OUTPATIENT)
Dept: CARDIOLOGY | Age: 61
Discharge: HOME | End: 2021-08-27
Payer: COMMERCIAL

## 2021-08-27 VITALS
DIASTOLIC BLOOD PRESSURE: 80 MMHG | RESPIRATION RATE: 18 BRPM | BODY MASS INDEX: 36.4 KG/M2 | HEIGHT: 71 IN | WEIGHT: 260 LBS | SYSTOLIC BLOOD PRESSURE: 144 MMHG | HEART RATE: 61 BPM

## 2021-08-27 DIAGNOSIS — R06.09 DOE (DYSPNEA ON EXERTION): Primary | ICD-10-CM

## 2021-08-27 DIAGNOSIS — R06.09 DOE (DYSPNEA ON EXERTION): ICD-10-CM

## 2021-08-27 LAB
LV EF: 71 %
LVEF MODALITY: NORMAL

## 2021-08-27 PROCEDURE — 78452 HT MUSCLE IMAGE SPECT MULT: CPT

## 2021-08-27 PROCEDURE — 93017 CV STRESS TEST TRACING ONLY: CPT

## 2021-08-27 PROCEDURE — 3430000000 HC RX DIAGNOSTIC RADIOPHARMACEUTICAL: Performed by: INTERNAL MEDICINE

## 2021-08-27 PROCEDURE — A9500 TC99M SESTAMIBI: HCPCS | Performed by: INTERNAL MEDICINE

## 2021-08-27 PROCEDURE — 2580000003 HC RX 258: Performed by: INTERNAL MEDICINE

## 2021-08-27 RX ORDER — SODIUM CHLORIDE 0.9 % (FLUSH) 0.9 %
10 SYRINGE (ML) INJECTION PRN
Status: DISCONTINUED | OUTPATIENT
Start: 2021-08-27 | End: 2021-08-28 | Stop reason: HOSPADM

## 2021-08-27 RX ADMIN — Medication 31.5 MILLICURIE: at 09:36

## 2021-08-27 RX ADMIN — Medication 9.8 MILLICURIE: at 07:25

## 2021-08-27 RX ADMIN — SODIUM CHLORIDE, PRESERVATIVE FREE 10 ML: 5 INJECTION INTRAVENOUS at 07:25

## 2021-08-27 NOTE — PROCEDURES
[address] [facility] [address] [place] [address] [address]  [phone]                Exercise Stress Nuclear Gated SPECT Study    Name: [address] Account Number: [de-identified]    :  1960      Sex: male              Date of Study:  2021    Height: 5' 11\" (180.3 cm)  Weight: 260 lb (117.9 kg)     Ordering Provider: Michael Liang MD          PCP: Mohit Tiwari DO      Cardiologist: Michael Liang MD                        Interpreting Physician: Angelina Chauhan MD  _________________________________________________________________________________    Indication:   Detecting the presence and location of coronary artery disease    Clinical History:   Patient has no known history of coronary artery disease. Resting ECG:    HR 61 bpm  Normal sinus rhythm and Nonspecific ST-T wave changes    Exercise: The patient exercised using a Phoenix protocol, completing 6:00 minutes and reaching an estimated work load of 7.0 metabolic equivalents (METS). Resting HR was 61. Peak exercise heart rate was 140 ( 87% of maximum predicted heart rate for age). Baseline /80. Peak exercise /80. The blood pressure response to exercise was normal      Exercise was terminated due to heart rate attained and dyspnea. The patient experienced no chest pain with exercise. Exercise ECG:   The patient demonstrated no arrhythmias during exercise. With exercise, there were no ST segment changes of significance at the heart rate achieved. Dong treadmill score was 6 implying low risk. IMAGING: Myocardial perfusion imaging was performed at rest 30-35 minutes following the intravenous injection of 9.8 mCi of (Tc-Sestamibi) followed by 10 ml of Normal Saline. At peak exercise, the patient was injected intravenously with 31.5 mCi of (Tc-Sestamibi) followed by 10 ml of Normal Saline.   Gated post-stress tomographic imaging was performed 20-25 minutes after stress. FINDINGS: The overall quality of the study was good. Left ventricular cavity size was noted to be normal.    Rotational analog analysis demonstrated patient motion and soft tissue attenuation. The gated SPECT stress imaging in the short, vertical long, and horizontal long axis demonstrated normal homogeneous tracer distribution throughout the myocardium. The resting images show no change. Gated SPECT left ventricular ejection fraction was calculated to be 71%, with normal myocardial thickening and wall motion. Impression:    1. Exercise EKG was negative. 2. The patient experienced no chest pain with exercise. 3. The myocardial perfusion imaging was normal with attenuation artifact. 4. Overall left ventricular systolic function was normal without regional wall motion abnormalities. 5. Dong treadmill score was 6 implying low risk. 6. Exercise capacity was average. 7. Low risk general exercise treadmill test.    Thank you for sending your patient to this [employer] [facility].      Electronically signed by Ciaran Frye MD on 8/27/21 at 2:01 PM EDT

## 2021-08-30 ENCOUNTER — HOSPITAL ENCOUNTER (OUTPATIENT)
Age: 61
Discharge: HOME | End: 2021-09-01

## 2021-08-30 ENCOUNTER — TELEPHONE (OUTPATIENT)
Dept: CARDIOLOGY CLINIC | Age: 61
End: 2021-08-30

## 2021-08-30 DIAGNOSIS — E78.2 MIXED HYPERLIPIDEMIA: ICD-10-CM

## 2021-08-30 DIAGNOSIS — Z76.0 MEDICATION REFILL: ICD-10-CM

## 2021-08-30 DIAGNOSIS — J30.2 SEASONAL ALLERGIC RHINITIS, UNSPECIFIED TRIGGER: ICD-10-CM

## 2021-08-30 PROCEDURE — 88305 TISSUE EXAM BY PATHOLOGIST: CPT

## 2021-08-30 NOTE — TELEPHONE ENCOUNTER
Patient notified and instructed on stress test results, ok for surgery and follow-up per Dr. Mervat Mitchell.

## 2021-08-30 NOTE — TELEPHONE ENCOUNTER
----- Message from Radha Barnett MD sent at 8/28/2021  9:42 AM EDT -----  Stress normal  Ok for TURP  Ov rpn

## 2021-08-31 RX ORDER — ATORVASTATIN CALCIUM 10 MG/1
TABLET, FILM COATED ORAL
Qty: 90 TABLET | Refills: 1 | Status: SHIPPED
Start: 2021-08-31 | End: 2022-02-16 | Stop reason: SDUPTHER

## 2021-08-31 RX ORDER — FLUTICASONE PROPIONATE 50 MCG
SPRAY, SUSPENSION (ML) NASAL
Qty: 16 G | Refills: 2 | Status: SHIPPED
Start: 2021-08-31 | End: 2022-02-16 | Stop reason: SDUPTHER

## 2021-08-31 RX ORDER — LORATADINE 10 MG/1
10 TABLET ORAL DAILY
Qty: 90 TABLET | Refills: 1 | Status: SHIPPED
Start: 2021-08-31 | End: 2022-02-16 | Stop reason: SDUPTHER

## 2021-08-31 RX ORDER — FAMOTIDINE 40 MG/1
40 TABLET, FILM COATED ORAL EVERY EVENING
Qty: 90 TABLET | Refills: 1 | Status: SHIPPED
Start: 2021-08-31 | End: 2022-02-16 | Stop reason: SDUPTHER

## 2021-08-31 RX ORDER — LOSARTAN POTASSIUM 100 MG/1
TABLET ORAL
Qty: 90 TABLET | Refills: 1 | Status: SHIPPED
Start: 2021-08-31 | End: 2022-02-16 | Stop reason: SDUPTHER

## 2021-08-31 RX ORDER — ASPIRIN 81 MG/1
81 TABLET ORAL DAILY
Qty: 90 TABLET | Refills: 1 | Status: SHIPPED
Start: 2021-08-31 | End: 2022-02-16 | Stop reason: SDUPTHER

## 2021-11-24 DIAGNOSIS — I10 ESSENTIAL HYPERTENSION: ICD-10-CM

## 2021-11-24 RX ORDER — HYDRALAZINE HYDROCHLORIDE 10 MG/1
10 TABLET, FILM COATED ORAL 3 TIMES DAILY
Qty: 90 TABLET | Refills: 3 | Status: SHIPPED
Start: 2021-11-24 | End: 2022-04-27 | Stop reason: SDUPTHER

## 2021-11-24 RX ORDER — METOPROLOL SUCCINATE 50 MG/1
50 TABLET, EXTENDED RELEASE ORAL 2 TIMES DAILY
Qty: 60 TABLET | Refills: 3 | Status: SHIPPED
Start: 2021-11-24 | End: 2022-04-27 | Stop reason: SDUPTHER

## 2021-12-06 ENCOUNTER — TELEPHONE (OUTPATIENT)
Dept: FAMILY MEDICINE CLINIC | Age: 61
End: 2021-12-06

## 2021-12-06 DIAGNOSIS — Z76.0 MEDICATION REFILL: ICD-10-CM

## 2021-12-07 RX ORDER — ACETAMINOPHEN 160 MG
TABLET,DISINTEGRATING ORAL
Qty: 90 CAPSULE | Refills: 1 | Status: SHIPPED
Start: 2021-12-07 | End: 2022-08-22 | Stop reason: SDUPTHER

## 2021-12-07 RX ORDER — IBUPROFEN 800 MG/1
800 TABLET ORAL EVERY 8 HOURS PRN
Qty: 90 TABLET | Refills: 3 | Status: SHIPPED
Start: 2021-12-07 | End: 2022-08-22 | Stop reason: SDUPTHER

## 2022-02-16 ENCOUNTER — OFFICE VISIT (OUTPATIENT)
Dept: FAMILY MEDICINE CLINIC | Age: 62
End: 2022-02-16
Payer: COMMERCIAL

## 2022-02-16 VITALS
DIASTOLIC BLOOD PRESSURE: 88 MMHG | OXYGEN SATURATION: 95 % | TEMPERATURE: 97 F | RESPIRATION RATE: 16 BRPM | WEIGHT: 265.4 LBS | HEART RATE: 82 BPM | SYSTOLIC BLOOD PRESSURE: 148 MMHG | BODY MASS INDEX: 37.15 KG/M2 | HEIGHT: 71 IN

## 2022-02-16 DIAGNOSIS — Z00.00 MEDICARE ANNUAL WELLNESS VISIT, SUBSEQUENT: Primary | ICD-10-CM

## 2022-02-16 DIAGNOSIS — Z76.0 MEDICATION REFILL: ICD-10-CM

## 2022-02-16 DIAGNOSIS — I10 ESSENTIAL HYPERTENSION: ICD-10-CM

## 2022-02-16 DIAGNOSIS — E55.9 VITAMIN D DEFICIENCY: ICD-10-CM

## 2022-02-16 DIAGNOSIS — J30.2 SEASONAL ALLERGIC RHINITIS, UNSPECIFIED TRIGGER: ICD-10-CM

## 2022-02-16 DIAGNOSIS — E78.2 MIXED HYPERLIPIDEMIA: ICD-10-CM

## 2022-02-16 PROCEDURE — G8427 DOCREV CUR MEDS BY ELIG CLIN: HCPCS | Performed by: FAMILY MEDICINE

## 2022-02-16 PROCEDURE — G8417 CALC BMI ABV UP PARAM F/U: HCPCS | Performed by: FAMILY MEDICINE

## 2022-02-16 PROCEDURE — 99214 OFFICE O/P EST MOD 30 MIN: CPT | Performed by: FAMILY MEDICINE

## 2022-02-16 PROCEDURE — 3017F COLORECTAL CA SCREEN DOC REV: CPT | Performed by: FAMILY MEDICINE

## 2022-02-16 PROCEDURE — G8484 FLU IMMUNIZE NO ADMIN: HCPCS | Performed by: FAMILY MEDICINE

## 2022-02-16 PROCEDURE — 1036F TOBACCO NON-USER: CPT | Performed by: FAMILY MEDICINE

## 2022-02-16 RX ORDER — FLUTICASONE PROPIONATE 50 MCG
SPRAY, SUSPENSION (ML) NASAL
Qty: 16 G | Refills: 2 | Status: SHIPPED | OUTPATIENT
Start: 2022-02-16 | End: 2022-08-12 | Stop reason: SDUPTHER

## 2022-02-16 RX ORDER — DOCUSATE SODIUM 100 MG/1
100 CAPSULE, LIQUID FILLED ORAL 2 TIMES DAILY PRN
Qty: 60 CAPSULE | Refills: 3 | Status: SHIPPED | OUTPATIENT
Start: 2022-02-16 | End: 2022-08-12 | Stop reason: SDUPTHER

## 2022-02-16 RX ORDER — ATORVASTATIN CALCIUM 10 MG/1
TABLET, FILM COATED ORAL
Qty: 90 TABLET | Refills: 1 | Status: SHIPPED | OUTPATIENT
Start: 2022-02-16 | End: 2022-08-12 | Stop reason: SDUPTHER

## 2022-02-16 RX ORDER — LORATADINE 10 MG/1
10 TABLET ORAL DAILY
Qty: 90 TABLET | Refills: 1 | Status: SHIPPED | OUTPATIENT
Start: 2022-02-16 | End: 2022-08-12 | Stop reason: SDUPTHER

## 2022-02-16 RX ORDER — ALBUTEROL SULFATE 90 UG/1
2 AEROSOL, METERED RESPIRATORY (INHALATION) 4 TIMES DAILY PRN
Qty: 18 G | Refills: 5 | Status: SHIPPED | OUTPATIENT
Start: 2022-02-16 | End: 2022-08-12 | Stop reason: SDUPTHER

## 2022-02-16 RX ORDER — METOPROLOL SUCCINATE 50 MG/1
50 TABLET, EXTENDED RELEASE ORAL 2 TIMES DAILY
Qty: 60 TABLET | Refills: 3 | Status: CANCELLED | OUTPATIENT
Start: 2022-02-16 | End: 2022-05-17

## 2022-02-16 RX ORDER — FAMOTIDINE 40 MG/1
40 TABLET, FILM COATED ORAL EVERY EVENING
Qty: 90 TABLET | Refills: 1 | Status: SHIPPED | OUTPATIENT
Start: 2022-02-16 | End: 2022-08-12 | Stop reason: SDUPTHER

## 2022-02-16 RX ORDER — LOSARTAN POTASSIUM 100 MG/1
TABLET ORAL
Qty: 90 TABLET | Refills: 1 | Status: SHIPPED | OUTPATIENT
Start: 2022-02-16 | End: 2022-08-12 | Stop reason: SDUPTHER

## 2022-02-16 RX ORDER — ASPIRIN 81 MG/1
81 TABLET ORAL DAILY
Qty: 90 TABLET | Refills: 1 | Status: SHIPPED | OUTPATIENT
Start: 2022-02-16 | End: 2022-08-12 | Stop reason: SDUPTHER

## 2022-02-16 RX ORDER — METOPROLOL SUCCINATE 50 MG/1
50 TABLET, EXTENDED RELEASE ORAL DAILY
Qty: 30 TABLET | Refills: 3 | Status: CANCELLED | OUTPATIENT
Start: 2022-02-16

## 2022-02-16 ASSESSMENT — PATIENT HEALTH QUESTIONNAIRE - PHQ9
7. TROUBLE CONCENTRATING ON THINGS, SUCH AS READING THE NEWSPAPER OR WATCHING TELEVISION: 0
SUM OF ALL RESPONSES TO PHQ QUESTIONS 1-9: 3
SUM OF ALL RESPONSES TO PHQ QUESTIONS 1-9: 0
SUM OF ALL RESPONSES TO PHQ QUESTIONS 1-9: 3
3. TROUBLE FALLING OR STAYING ASLEEP: 1
5. POOR APPETITE OR OVEREATING: 0
4. FEELING TIRED OR HAVING LITTLE ENERGY: 1
9. THOUGHTS THAT YOU WOULD BE BETTER OFF DEAD, OR OF HURTING YOURSELF: 0
SUM OF ALL RESPONSES TO PHQ QUESTIONS 1-9: 0
2. FEELING DOWN, DEPRESSED OR HOPELESS: 0
SUM OF ALL RESPONSES TO PHQ QUESTIONS 1-9: 3
SUM OF ALL RESPONSES TO PHQ9 QUESTIONS 1 & 2: 1
SUM OF ALL RESPONSES TO PHQ9 QUESTIONS 1 & 2: 0
1. LITTLE INTEREST OR PLEASURE IN DOING THINGS: 0
SUM OF ALL RESPONSES TO PHQ QUESTIONS 1-9: 3
10. IF YOU CHECKED OFF ANY PROBLEMS, HOW DIFFICULT HAVE THESE PROBLEMS MADE IT FOR YOU TO DO YOUR WORK, TAKE CARE OF THINGS AT HOME, OR GET ALONG WITH OTHER PEOPLE: 0
SUM OF ALL RESPONSES TO PHQ QUESTIONS 1-9: 0
6. FEELING BAD ABOUT YOURSELF - OR THAT YOU ARE A FAILURE OR HAVE LET YOURSELF OR YOUR FAMILY DOWN: 0
1. LITTLE INTEREST OR PLEASURE IN DOING THINGS: 0
SUM OF ALL RESPONSES TO PHQ QUESTIONS 1-9: 0
8. MOVING OR SPEAKING SO SLOWLY THAT OTHER PEOPLE COULD HAVE NOTICED. OR THE OPPOSITE, BEING SO FIGETY OR RESTLESS THAT YOU HAVE BEEN MOVING AROUND A LOT MORE THAN USUAL: 0
2. FEELING DOWN, DEPRESSED OR HOPELESS: 1

## 2022-02-16 NOTE — PROGRESS NOTES
Medicare Annual Wellness Visit    Aleena Winston is here for Medicare [address] [facility] [address] [address] was seen today for medicare awv. Diagnoses and all orders for this visit:    Medicare annual wellness visit, subsequent    Medication refill  -     loratadine (CLARITIN) 10 MG tablet; Take 1 tablet by mouth daily  -     fluticasone (FLONASE) 50 MCG/ACT nasal spray; PLACE 1 OR 2 SPRAYS INTO EACH NOSTRIL ONCE DAILY AS DIRECTED    Essential hypertension  -     CBC with Auto Differential; Future  -     Comprehensive Metabolic Panel; Future  -     TSH; Future    Mixed hyperlipidemia  -     atorvastatin (LIPITOR) 10 MG tablet; TAKE ONE TABLET BY MOUTH EVERY DAY  -     Lipid Panel; Future    Seasonal allergic rhinitis, unspecified trigger  -     fluticasone (FLONASE) 50 MCG/ACT nasal spray; PLACE 1 OR 2 SPRAYS INTO EACH NOSTRIL ONCE DAILY AS DIRECTED    Vitamin D deficiency  -     Vitamin D 25 Hydroxy; Future    Other orders  -     famotidine (PEPCID) 40 MG tablet; Take 1 tablet by mouth every evening  -     losartan (COZAAR) 100 MG tablet; TAKE ONE TABLET BY MOUTH EVERY DAY  -     aspirin EC 81 MG EC tablet; Take 1 tablet by mouth daily  -     docusate sodium (DOK) 100 MG capsule; Take 1 capsule by mouth 2 times daily as needed for Constipation  -     albuterol sulfate HFA (VENTOLIN HFA) 108 (90 Base) MCG/ACT inhaler; Inhale 2 puffs into the lungs 4 times daily as needed for Wheezing         Recommendations for Preventive Services Due: see orders and patient instructions/AVS.  Recommended screening schedule for the next 5-10 years is provided to the patient in written form: see Patient Instructions/AVS.     Return in 3 months (on 5/16/2022) for dyspnea.   Reviewed and updated this visit by clinical staff:  Tobacco  Allergies  Meds  Problems  Med Hx  Surg Hx  Soc Hx  Fam Hx        Subjective   The following acute and/or chronic problems were also addressed today:  HTN, HLD, prostate ca    Patient's complete Health Risk Assessment and screening values have been reviewed and are found in Flowsheets. The following problems were reviewed today and where indicated follow up appointments were made and/or referrals ordered. Positive Risk Factor Screenings with Interventions:         Drug Use Screening:   DAST-10 Score Interpretation:  1-2: Low level - Monitor, re-assess at a later date; 3-5: Moderate level - Further Investigation; 6-8: Substantial level - Intensive Assessment; 9-10: Severe level - Intensive Assessment    Substance Abuse - Drug Use Interventions:  doing well        Health Habits/Nutrition:     Physical Activity:     Days of Exercise per Week: Not on file    Minutes of Exercise per Session: Not on file          Body mass index: (!) 37.01           Health Habits/Nutrition Interventions:  · doing well          Objective               Allergies   Allergen Reactions    Bee Venom     Dye [Iodides] Other (See Comments)     Patient reports father had an anaphylactic reaction to iodide which result in death    Iodine Solution [Povidone Iodine] Rash     TOPICAL AND INJECTED    Sulfa Antibiotics Rash    Topical Sulfur Rash     Prior to Visit Medications    Medication Sig Taking?  Authorizing Provider   famotidine (PEPCID) 40 MG tablet Take 1 tablet by mouth every evening Yes Deshaun Laird DO   losartan (COZAAR) 100 MG tablet TAKE ONE TABLET BY MOUTH EVERY DAY Yes Kasey Laird DO   loratadine (CLARITIN) 10 MG tablet Take 1 tablet by mouth daily Yes Deshaun Laird DO   atorvastatin (LIPITOR) 10 MG tablet TAKE ONE TABLET BY MOUTH EVERY DAY Yes Kasey Laird DO   aspirin EC 81 MG EC tablet Take 1 tablet by mouth daily Yes Deshaun Laird DO   docusate sodium (DOK) 100 MG capsule Take 1 capsule by mouth 2 times daily as needed for Constipation Yes Deshaun Laird DO   fluticasone (FLONASE) 50 MCG/ACT nasal spray PLACE 1 OR 2 SPRAYS INTO EACH NOSTRIL ONCE DAILY AS DIRECTED Yes Deshaun Luna Sisi, DO   albuterol sulfate HFA (VENTOLIN HFA) 108 (90 Base) MCG/ACT inhaler Inhale 2 puffs into the lungs 4 times daily as needed for Wheezing Yes Dejah Lira, DO   Cholecalciferol (VITAMIN D3) 50 MCG (2000 UT) CAPS Take one tab po daily Yes Inna Nieves, DO   ibuprofen (ADVIL;MOTRIN) 800 MG tablet Take 1 tablet by mouth every 8 hours as needed for Pain Yes Dileep Campbell, DO   hydrALAZINE (APRESOLINE) 10 MG tablet Take 1 tablet by mouth 3 times daily Yes Darlene Laird, DO   metoprolol succinate (TOPROL XL) 50 MG extended release tablet Take 1 tablet by mouth 2 times daily Yes Darlene Laird, DO   ciclopirox (PENLAC) 8 % solution Apply topically nightly. Yes Darlene Laird, DO   calcium carbonate (CALCIUM 600) 600 MG TABS tablet Take 1 tablet by mouth daily Yes Darlene Laird, DO   clobetasol (TEMOVATE) 0.05 % ointment Apply topically 2 times daily. Yes Dejah Lira, DO   leuprolide acetate, 4 Month, (ELIGARD) 30 MG injection Inject into the skin every 6 months Yes Historical Provider, MD   XTANDI 40 MG capsule Indications: taking 4 pills at once daily  Yes Historical Provider, MD   tamsulosin (FLOMAX) 0.4 MG capsule  Yes Historical Provider, MD   ketorolac (ACULAR) 0.5 % ophthalmic solution Place 1 drop into both eyes 4 times daily Yes Darlene Laird, DO   EPINEPHrine (EPIPEN 2-EVANGELISTA) 0.3 MG/0.3ML SOAJ injection Inject 0.3 mLs into the muscle as needed (bee) Use as directed for allergic reaction Yes Darlene Laird, DO   triamcinolone (KENALOG) 0.1 % cream Apply topically 2 times daily. Yes Darlene Laird, DO   desonide (DESOWEN) 0.05 % cream Apply to affected area on face and neck twice daily, 5 days on 2 days off, as needed. Yes Historical Provider, MD   fluocinonide (LIDEX) 0.05 % cream Apply to affected area twice daily, 5 days on 2 days off, as needed. Not for face, armpits, or groin.  Yes Historical Provider, MD   Psyllium (METAMUCIL FIBER PO) Take by mouth Yes Historical Provider, MD   amphetamine-dextroamphetamine (ADDERALL) 30 MG tablet Take 30 mg by mouth as needed. Indications: take 1 tablet in the afternoon  Yes Historical Provider, MD   lisdexamfetamine (VYVANSE) 70 MG capsule Take 1 capsule by mouth daily. Yes Historical Provider, MD   mineral oil-hydrophilic petrolatum (AQUAPHOR) ointment Apply topically as needed.  Yes Sabrina Verma MD       [facility] (Including outside providers/suppliers regularly involved in providing care):   Patient Care Team:  Mohit 5, DO as PCP - General (Family Medicine)  Mohit 5, DO as PCP - [facility] Empaneled Provider  Kamaljit Pressley MD as Surgeon (Neurosurgery)  Mireille Ragland MD as Consulting Physician (Neurosurgery)  Lisbeth Kumari MD as Consulting Physician (Cardiology)

## 2022-02-16 NOTE — PATIENT INSTRUCTIONS
Personalized Preventive Plan for Madie Gould - 2/16/2022  Medicare offers a range of preventive health benefits. Some of the tests and screenings are paid in full while other may be subject to a deductible, co-insurance, and/or copay. Some of these benefits include a comprehensive review of your medical history including lifestyle, illnesses that may run in your family, and various assessments and screenings as appropriate. After reviewing your medical record and screening and assessments performed today your provider may have ordered immunizations, labs, imaging, and/or referrals for you. A list of these orders (if applicable) as well as your Preventive Care list are included within your After Visit Summary for your review. Other Preventive Recommendations:    · A preventive eye exam performed by an eye specialist is recommended every 1-2 years to screen for glaucoma; cataracts, macular degeneration, and other eye disorders. · A preventive dental visit is recommended every 6 months. · Try to get at least 150 minutes of exercise per week or 10,000 steps per day on a pedometer . · Order or download the FREE \"Exercise & Physical Activity: Your Everyday Guide\" from [employer]. Call 4-913.221.8432 or search [employer] online. · You need 7687-3837 mg of calcium and 7466-6260 IU of vitamin D per day. It is possible to meet your calcium requirement with diet alone, but a vitamin D supplement is usually necessary to meet this goal.  · When exposed to the sun, use a sunscreen that protects against both UVA and UVB radiation with an SPF of 30 or greater. Reapply every 2 to 3 hours or after sweating, drying off with a towel, or swimming. · Always wear a seat belt when traveling in a car. Always wear a helmet when riding a bicycle or motorcycle.

## 2022-03-12 LAB
ALBUMIN SERPL-MCNC: NORMAL G/DL
ALP BLD-CCNC: NORMAL U/L
ALT SERPL-CCNC: NORMAL U/L
ANION GAP SERPL CALCULATED.3IONS-SCNC: NORMAL MMOL/L
AST SERPL-CCNC: NORMAL U/L
BASOPHILS ABSOLUTE: NORMAL
BASOPHILS RELATIVE PERCENT: NORMAL
BILIRUB SERPL-MCNC: NORMAL MG/DL
BUN BLDV-MCNC: NORMAL MG/DL
CALCIUM SERPL-MCNC: NORMAL MG/DL
CHLORIDE BLD-SCNC: NORMAL MMOL/L
CHOLESTEROL, TOTAL: NORMAL
CHOLESTEROL/HDL RATIO: NORMAL
CO2: NORMAL
CREAT SERPL-MCNC: NORMAL MG/DL
EOSINOPHILS ABSOLUTE: NORMAL
EOSINOPHILS RELATIVE PERCENT: NORMAL
GFR CALCULATED: NORMAL
GLUCOSE BLD-MCNC: NORMAL MG/DL
HCT VFR BLD CALC: NORMAL %
HDLC SERPL-MCNC: NORMAL MG/DL
HEMOGLOBIN: NORMAL
LDL CHOLESTEROL CALCULATED: NORMAL
LYMPHOCYTES ABSOLUTE: NORMAL
LYMPHOCYTES RELATIVE PERCENT: NORMAL
MCH RBC QN AUTO: NORMAL PG
MCHC RBC AUTO-ENTMCNC: NORMAL G/DL
MCV RBC AUTO: NORMAL FL
MONOCYTES ABSOLUTE: NORMAL
MONOCYTES RELATIVE PERCENT: NORMAL
NEUTROPHILS ABSOLUTE: NORMAL
NEUTROPHILS RELATIVE PERCENT: NORMAL
NONHDLC SERPL-MCNC: NORMAL MG/DL
PDW BLD-RTO: NORMAL %
PLATELET # BLD: NORMAL 10*3/UL
PMV BLD AUTO: NORMAL FL
POTASSIUM SERPL-SCNC: NORMAL MMOL/L
RBC # BLD: NORMAL 10*6/UL
SODIUM BLD-SCNC: NORMAL MMOL/L
TOTAL PROTEIN: NORMAL
TRIGL SERPL-MCNC: NORMAL MG/DL
TSH SERPL DL<=0.05 MIU/L-ACNC: NORMAL M[IU]/L
VITAMIN D 25-HYDROXY: NORMAL
VITAMIN D2, 25 HYDROXY: NORMAL
VITAMIN D3,25 HYDROXY: NORMAL
VLDLC SERPL CALC-MCNC: NORMAL MG/DL
WBC # BLD: NORMAL 10*3/UL

## 2022-03-17 DIAGNOSIS — E78.2 MIXED HYPERLIPIDEMIA: ICD-10-CM

## 2022-03-17 DIAGNOSIS — E55.9 VITAMIN D DEFICIENCY: ICD-10-CM

## 2022-03-17 DIAGNOSIS — I10 ESSENTIAL HYPERTENSION: ICD-10-CM

## 2022-03-17 RX ORDER — EPINEPHRINE 0.3 MG/.3ML
0.3 INJECTION SUBCUTANEOUS PRN
Qty: 1 EACH | Refills: 1 | Status: SHIPPED | OUTPATIENT
Start: 2022-03-17

## 2022-04-27 DIAGNOSIS — I10 ESSENTIAL HYPERTENSION: ICD-10-CM

## 2022-04-27 RX ORDER — METOPROLOL SUCCINATE 50 MG/1
50 TABLET, EXTENDED RELEASE ORAL 2 TIMES DAILY
Qty: 60 TABLET | Refills: 3 | Status: SHIPPED | OUTPATIENT
Start: 2022-04-27 | End: 2022-08-12 | Stop reason: SDUPTHER

## 2022-04-27 RX ORDER — HYDRALAZINE HYDROCHLORIDE 10 MG/1
10 TABLET, FILM COATED ORAL 3 TIMES DAILY
Qty: 90 TABLET | Refills: 3 | Status: SHIPPED | OUTPATIENT
Start: 2022-04-27 | End: 2022-08-12 | Stop reason: SDUPTHER

## 2022-05-23 ENCOUNTER — OFFICE VISIT (OUTPATIENT)
Dept: FAMILY MEDICINE CLINIC | Age: 62
End: 2022-05-23
Payer: COMMERCIAL

## 2022-05-23 VITALS
DIASTOLIC BLOOD PRESSURE: 82 MMHG | WEIGHT: 264 LBS | BODY MASS INDEX: 36.96 KG/M2 | HEIGHT: 71 IN | SYSTOLIC BLOOD PRESSURE: 134 MMHG | RESPIRATION RATE: 20 BRPM | TEMPERATURE: 98.1 F | HEART RATE: 90 BPM

## 2022-05-23 DIAGNOSIS — M96.1 FAILED BACK SYNDROME: ICD-10-CM

## 2022-05-23 DIAGNOSIS — I10 ESSENTIAL HYPERTENSION: ICD-10-CM

## 2022-05-23 DIAGNOSIS — N40.0 BENIGN PROSTATIC HYPERPLASIA WITHOUT LOWER URINARY TRACT SYMPTOMS: ICD-10-CM

## 2022-05-23 DIAGNOSIS — E66.09 CLASS 2 OBESITY DUE TO EXCESS CALORIES WITHOUT SERIOUS COMORBIDITY WITH BODY MASS INDEX (BMI) OF 36.0 TO 36.9 IN ADULT: ICD-10-CM

## 2022-05-23 DIAGNOSIS — C61 PROSTATE CANCER (HCC): ICD-10-CM

## 2022-05-23 DIAGNOSIS — E78.2 MIXED HYPERLIPIDEMIA: ICD-10-CM

## 2022-05-23 DIAGNOSIS — Z76.89 ENCOUNTER TO ESTABLISH CARE: Primary | ICD-10-CM

## 2022-05-23 DIAGNOSIS — Z96.82 S/P INSERTION OF BRAIN-RESPONSIVE NEUROSTIMULATION DEVICE: ICD-10-CM

## 2022-05-23 PROBLEM — E66.812 CLASS 2 OBESITY DUE TO EXCESS CALORIES WITHOUT SERIOUS COMORBIDITY WITH BODY MASS INDEX (BMI) OF 36.0 TO 36.9 IN ADULT: Status: ACTIVE | Noted: 2022-05-23

## 2022-05-23 PROCEDURE — 99214 OFFICE O/P EST MOD 30 MIN: CPT | Performed by: NEUROMUSCULOSKELETAL MEDICINE & OMM

## 2022-05-23 ASSESSMENT — ENCOUNTER SYMPTOMS
CHOKING: 0
SHORTNESS OF BREATH: 0
COUGH: 0
STRIDOR: 0
WHEEZING: 0
CHEST TIGHTNESS: 0

## 2022-05-23 NOTE — ASSESSMENT & PLAN NOTE
Recommended low-salt, and DASH diet for better blood pressure control. Aerobic exercise 4-5 times a week for 20 to 30 minutes per episode.

## 2022-05-23 NOTE — PROGRESS NOTES
Rupa Grover (:  1960) is a 63 y.o. male,Established patient, here for evaluation of the following chief complaint(s):  Establish Care         ASSESSMENT/PLAN:  1. Encounter to establish care  2. Class 2 obesity due to excess calories without serious comorbidity with body mass index (BMI) of 36.0 to 36.9 in adult  Assessment & Plan:  Recommended low-salt, and DASH diet for better blood pressure control. Aerobic exercise 4-5 times a week for 20 to 30 minutes per episode. 3. Failed back syndrome  4. Essential hypertension  Assessment & Plan:  Stable today with blood pressure 134/82. Denies any chest pain shortness of breath heart palpitations, and or swelling of his lower extremities with exertion. 5. Mixed hyperlipidemia  6. S/P insertion of brain-responsive neurostimulation device  Assessment & Plan:  Pain controlled no recent issues with the device. 7. Prostate cancer ([facility] [address]  8. Benign prostatic hyperplasia without lower urinary tract symptoms      Return in about 6 months (around 2022). Subjective   SUBJECTIVE/OBJECTIVE:  HPI 65-year-old male here to establish care former patient of Dr. Michell Liz. Patient has a history of prostate CA, diagnosed in  per Dr. Debbie Valadez. Recently had a TURP procedure done for his BPH symptoms. Patient needs no refills at this time. His last fasting blood work was on 3- lipid panel showed LDL of 80, triglycerides 248, and HDL of 43. Fasting blood sugar was 102, liver and kidney functions were within normal range. Patient states he has had about 10 back surgeries in the past and he is status post neurostimulator placed in his back. He takes ibuprofen or Motrin 600 or 800 mg occasionally for his discomfort. Review of Systems   Constitutional: Negative for activity change, appetite change, fatigue and unexpected weight change. Respiratory: Negative for cough, choking, chest tightness, shortness of breath, wheezing and stridor. Cardiovascular: Negative for chest pain and palpitations. Neurological: Negative for weakness and headaches. Objective   /82   Pulse 90   Temp 98.1 °F (36.7 °C) (Temporal)   Resp 20   Ht 5' 11\" (1.803 m)   Wt 264 lb (119.7 kg)   BMI 36.82 kg/m²     Physical Exam  Vitals and nursing note reviewed. Constitutional:       General: He is not in acute distress. Appearance: Normal appearance. He is obese. He is not ill-appearing or diaphoretic. HENT:      Head: Normocephalic and atraumatic. Right Ear: Tympanic membrane, ear canal and external ear normal. There is no impacted cerumen. Left Ear: Tympanic membrane, ear canal and external ear normal. There is no impacted cerumen. Mouth/Throat:      Mouth: Mucous membranes are moist.      Pharynx: Oropharynx is clear. No oropharyngeal exudate or posterior oropharyngeal erythema. Eyes:      General: No scleral icterus. Right eye: No discharge. Left eye: No discharge. Conjunctiva/sclera: Conjunctivae normal.   Cardiovascular:      Rate and Rhythm: Normal rate and regular rhythm. Pulses: Normal pulses. Heart sounds: Normal heart sounds. No murmur heard. No gallop. Pulmonary:      Effort: Pulmonary effort is normal. No respiratory distress. Breath sounds: Normal breath sounds. No stridor. No wheezing, rhonchi or rales. Musculoskeletal:      Cervical back: Neck supple. No tenderness. Lymphadenopathy:      Cervical: No cervical adenopathy. Skin:     General: Skin is warm and dry. Neurological:      Mental Status: He is alert and oriented to person, place, and time. Mental status is at baseline. Psychiatric:         Mood and Affect: Mood normal.         Behavior: Behavior normal.             Past Medical History:   Diagnosis Date    Acute drug-induced gout of left foot 8/29/2015    Had gout attack on 7/18/15. Seems to be HCTZ induced.       ADHD (attention deficit hyperactivity disorder)     Back pain, chronic     Depression     Hyperlipidemia     Hypertension     Left elbow fracture 10/27/2015    MRSA (methicillin resistant staph aureus) culture positive     Seasonal allergies         Past Surgical History:   Procedure Laterality Date    APPENDECTOMY  1972    COLECTOMY Bilateral 02/2019    COLONOSCOPY  2/1/2013    sigmoid diverticulosis, Dr. Otilia Wiseman, [address] [place] [address] COLONOSCOPY  02/01/2013    ENDOSCOPIC ULTRASOUND (LOWER) Bilateral 02/2019    upper    FOOT SURGERY  1990s    RIGHT DUE TO FX    [facility]     OTHER SURGICAL HISTORY  03/28/2018    removal and replacement of spinal cord stimulator    AR IMPLANT NEUROSTIM/ N/A 3/28/2018    REMOVAL OF T7 - T8 MALFUCTIONING  STIMULATOR, REPLACEMENT OF T7- T8 SPINAL CORD STIMULATOR --LIDIA, CASTILLO TABLE, Nexmo performed by Laura Anderson MD at [place] [address]  2009    insertion of spinal cord stimulator and since then has had 8 different surgeries to take it out and put it in due to MRSA infections    SPINE SURGERY  10/11/2011    INSERTION OF SPINAL CORD STIMULATOR    SPINE SURGERY  6/2012    change of spinal cord stimulator, Dr. Veronica Moncada, [address] [place] [address] TONSILLECTOMY  1972        The 10-year ASCVD risk score (Sonja Castillo, et al., 2013) is: 9.5%    Values used to calculate the score:      Age: 63 years      Sex: Male      Is Non- : No      Diabetic: No      Tobacco smoker: No      Systolic Blood Pressure: 125 mmHg      Is BP treated: Yes      HDL Cholesterol: 42 mg/dL      Total Cholesterol: 165 mg/dL     An electronic signature was used to authenticate this note.     --Mariana Sarmiento DO

## 2022-05-23 NOTE — PATIENT INSTRUCTIONS
Patient Education        High Cholesterol: Care Instructions  Overview     Cholesterol is a type of fat in your blood. It is needed for many body functions, such as making new cells. Cholesterol is made by your body. It also comes from food you eat. High cholesterol means that you have too much of thefat in your blood. This raises your risk of a heart attack and stroke. LDL and HDL are part of your total cholesterol. LDL is the \"bad\" cholesterol. High LDL can raise your risk for coronary artery disease, heart attack, and stroke. HDL is the \"good\" cholesterol. It helps clear bad cholesterol from the body. High HDL is linked with a lower risk of coronary artery disease, heartattack, and stroke. Your cholesterol levels help your doctor find out your risk for having a heart attack or stroke. You and your doctor can talk about whether you need to loweryour risk and what treatment is best for you. Treatment options include a heart-healthy lifestyle and medicine. Both options can help lower your cholesterol and your risk. The way you choose to lower your risk will depend on how high your risk is for heart attack and stroke. It willalso depend on how you feel about taking medicines. Follow-up care is a key part of your treatment and safety. Be sure to make and go to all appointments, and call your doctor if you are having problems. It's also a good idea to know your test results and keep alist of the medicines you take. How can you care for yourself at home?  Eat heart-healthy foods. ? Eat fruits, vegetables, whole grains, beans, and other high-fiber foods. ? Eat lean proteins, such as seafood, lean meats, beans, nuts, and soy products. ? Eat healthy fats, such as canola and olive oil. ? Choose foods that are low in saturated fat. ? Limit sodium and alcohol. ? Limit drinks and foods with added sugar.  Be physically active. Try to do moderate activity at least 2½ hours a week.  Or try to do vigorous activity at least 1¼ hours a week. You may want to walk or try other activities, such as running, swimming, cycling, or playing tennis or team sports.  Stay at a healthy weight or lose weight by making the changes in eating and physical activity listed above. Losing just a small amount of weight, even 5 to 10 pounds, can help reduce your risk for having a heart attack or stroke.  Do not smoke.  Manage other health problems. These include diabetes and high blood pressure. If you think you may have a problem with alcohol or drug use, talk to your doctor.  If you take medicine, take it exactly as prescribed. Call your doctor if you think you are having a problem with your medicine.  Check with your doctor or pharmacist before you use any other medicines, including over-the-counter medicines. Make sure your doctor knows all of the medicines, vitamins, herbal products, and supplements you take. Taking some medicines together can cause problems. When should you call for help? Watch closely for changes in your health, and be sure to contact your doctor if:     You need help making lifestyle changes.      You have questions about your medicine. Where can you learn more? Go to https://Socialthing.TradeHero. org and sign in to your [email or web address] account. Enter C986 in the [facility] box to learn more about \"High Cholesterol: Care Instructions. \"     If you do not have an account, please click on the \"Sign Up Now\" link. Current as of: January 10, 2022               Content Version: 13.2  © 2006-2022 Healthwise, Incorporated. Care instructions adapted under license by [facility] ([facility]). If you have questions about a medical condition or this instruction, always ask your healthcare professional. [place] [address] any warranty or liability for your use of this information. Patient Education        Learning About Low-Carbohydrate Diets  What is a low-carbohydrate diet?      A low-carbohydrate (or \"low-carb\") diet limits foods and drinks that have carbohydrates. This includes grains, fruits, milk and yogurt, and starchy vegetables like potatoes, beans, and corn. It also avoids foods and drinks that have added sugar. Instead, low-carb diets include foods that are high inprotein and fat. Why might you follow a low-carb diet? Low-carb diets may be used for a variety of reasons, such as for weight loss. People who have diabetes may use a low-carb diet to help manage their bloodsugar levels. What should you do before you start the diet? Talk to your doctor before you try any diet. This is even more important if you have health problems like kidney disease, heart disease, or diabetes. Your doctor may suggest that you meet with a registered dietitian. A dietitian canhelp you make an eating plan that works for you. What foods do you eat on a low-carb diet? On a low-carb diet, you choose foods that are high in protein and fat. Examplesof these are:  [employer], poultry, and fish.  Eggs.  Nuts, such as walnuts, pecans, almonds, and peanuts.  Peanut butter and other nut butters.  Tofu.  Avocado.  Olives.  Non-starchy vegetables like broccoli, cauliflower, green beans, mushrooms, peppers, lettuce, and spinach.  Unsweetened non-dairy milks like almond milk and coconut milk.  Cheese, cottage cheese, and cream cheese. Where can you learn more? Go to https://Odeonorma.Dental Kidz. org and sign in to your [employer]Tap account. Enter C335 in the kajeet box to learn more about \"Learning About Low-Carbohydrate Diets. \"     If you do not have an account, please click on the \"Sign Up Now\" link. Current as of: September 8, 2021               Content Version: 13.2  © 2111-0938 Healthwise, Incorporated. Care instructions adapted under license by [facility] ([facility]).  If you have questions about a medical condition or this instruction, always ask your healthcare professional. [employer] disclaims any warranty or liability for your use of this information.

## 2022-05-23 NOTE — ASSESSMENT & PLAN NOTE
Stable today with blood pressure 134/82. Denies any chest pain shortness of breath heart palpitations, and or swelling of his lower extremities with exertion.

## 2022-08-12 ENCOUNTER — TELEPHONE (OUTPATIENT)
Dept: FAMILY MEDICINE CLINIC | Age: 62
End: 2022-08-12

## 2022-08-12 DIAGNOSIS — Z76.0 MEDICATION REFILL: ICD-10-CM

## 2022-08-12 DIAGNOSIS — J30.2 SEASONAL ALLERGIC RHINITIS, UNSPECIFIED TRIGGER: ICD-10-CM

## 2022-08-12 DIAGNOSIS — K59.04 CHRONIC IDIOPATHIC CONSTIPATION: ICD-10-CM

## 2022-08-12 DIAGNOSIS — K21.9 GASTROESOPHAGEAL REFLUX DISEASE WITHOUT ESOPHAGITIS: Primary | ICD-10-CM

## 2022-08-12 DIAGNOSIS — I10 ESSENTIAL HYPERTENSION: ICD-10-CM

## 2022-08-12 DIAGNOSIS — E78.2 MIXED HYPERLIPIDEMIA: ICD-10-CM

## 2022-08-12 RX ORDER — HYDRALAZINE HYDROCHLORIDE 10 MG/1
10 TABLET, FILM COATED ORAL 3 TIMES DAILY
Qty: 90 TABLET | Refills: 1 | Status: SHIPPED | OUTPATIENT
Start: 2022-08-12 | End: 2023-08-12

## 2022-08-12 RX ORDER — FAMOTIDINE 40 MG/1
40 TABLET, FILM COATED ORAL EVERY EVENING
Qty: 90 TABLET | Refills: 1 | Status: SHIPPED | OUTPATIENT
Start: 2022-08-12

## 2022-08-12 RX ORDER — DOCUSATE SODIUM 100 MG/1
100 CAPSULE, LIQUID FILLED ORAL 2 TIMES DAILY PRN
Qty: 180 CAPSULE | Refills: 1 | Status: SHIPPED | OUTPATIENT
Start: 2022-08-12

## 2022-08-12 RX ORDER — ATORVASTATIN CALCIUM 10 MG/1
TABLET, FILM COATED ORAL
Qty: 90 TABLET | Refills: 1 | Status: SHIPPED | OUTPATIENT
Start: 2022-08-12

## 2022-08-12 RX ORDER — FLUTICASONE PROPIONATE 50 MCG
SPRAY, SUSPENSION (ML) NASAL
Qty: 16 G | Refills: 3 | Status: SHIPPED | OUTPATIENT
Start: 2022-08-12

## 2022-08-12 RX ORDER — ASPIRIN 81 MG/1
81 TABLET ORAL DAILY
Qty: 90 TABLET | Refills: 1 | Status: SHIPPED | OUTPATIENT
Start: 2022-08-12

## 2022-08-12 RX ORDER — LOSARTAN POTASSIUM 100 MG/1
TABLET ORAL
Qty: 90 TABLET | Refills: 1 | Status: SHIPPED | OUTPATIENT
Start: 2022-08-12

## 2022-08-12 RX ORDER — LORATADINE 10 MG/1
10 TABLET ORAL DAILY
Qty: 90 TABLET | Refills: 1 | Status: SHIPPED | OUTPATIENT
Start: 2022-08-12

## 2022-08-12 RX ORDER — METOPROLOL SUCCINATE 50 MG/1
50 TABLET, EXTENDED RELEASE ORAL 2 TIMES DAILY
Qty: 180 TABLET | Refills: 1 | Status: SHIPPED | OUTPATIENT
Start: 2022-08-12 | End: 2022-11-10

## 2022-08-12 RX ORDER — ALBUTEROL SULFATE 90 UG/1
2 AEROSOL, METERED RESPIRATORY (INHALATION) 4 TIMES DAILY PRN
Qty: 18 G | Refills: 3 | Status: SHIPPED | OUTPATIENT
Start: 2022-08-12

## 2022-08-12 NOTE — TELEPHONE ENCOUNTER
metoprolol succinate (TOPROL XL) 50 MG extended release tablet   hydrALAZINE (APRESOLINE) 10 MG tablet   famotidine (PEPCID) 40 MG tablet   losartan (COZAAR) 100 MG tablet   loratadine (CLARITIN) 10 MG tablet   atorvastatin (LIPITOR) 10 MG tablet   aspirin EC 81 MG EC tablet   docusate sodium (DOK) 100 MG capsule   fluticasone (FLONASE) 50 MCG/ACT nasal spray   albuterol sulfate HFA (VENTOLIN HFA) 108 (90 Base) MCG/ACT inhaler   Pharmacy called for refills

## 2022-08-22 DIAGNOSIS — Z76.0 MEDICATION REFILL: ICD-10-CM

## 2022-08-22 RX ORDER — IBUPROFEN 800 MG/1
800 TABLET ORAL EVERY 8 HOURS PRN
Qty: 90 TABLET | Refills: 3 | Status: SHIPPED | OUTPATIENT
Start: 2022-08-22

## 2022-08-22 RX ORDER — ACETAMINOPHEN 160 MG
TABLET,DISINTEGRATING ORAL
Qty: 90 CAPSULE | Refills: 1 | Status: SHIPPED | OUTPATIENT
Start: 2022-08-22

## 2022-09-07 ENCOUNTER — TELEPHONE (OUTPATIENT)
Dept: ADMINISTRATIVE | Age: 62
End: 2022-09-07

## 2022-09-08 NOTE — TELEPHONE ENCOUNTER
DATE OF PROCEDURE:  10/28/2015    SURGEON:  Lissy Villa D.O.      OPERATION:  (1)  Left olecranon, proximal ulna, and coronoid fractures open reduction with internal fixation. (2)  Left radial head fracture excision with radial head arthroplasty.     OK to see within the next 2-3 weeks  Electronically signed by Wilber Manning PA-C on 9/8/2022 at 2:30 PM

## 2022-09-09 NOTE — TELEPHONE ENCOUNTER
Unfortunately having not seen patient since 2016 cannot provide further recommendations without evaluation  Electronically signed by Klever Amador PA-C on 9/9/2022 at 2:54 PM

## 2022-09-09 NOTE — TELEPHONE ENCOUNTER
Call to pt scheduled appt   Future Appointments   Date Time Provider Sanjuanita Leana   9/27/2022 10:15 AM DO OSVALDO Mckeon [facility]   11/23/2022  2:00 PM DO Fito Yoo  [address] office location. Pt has clinical question. Trying multiple ways to alleviate pain- lidocaine patches/tens unit/Voltaren/ ice/ Motrin. Advised provider usually recommend ice/elevate, alternate Tylenol/Ibuprofen . He stated he does not have swelling. Routing to clinical staff for advice/call back to pt.

## 2022-09-23 DIAGNOSIS — S52.022S CLOSED OLECRANON FRACTURE, LEFT, SEQUELA: Primary | ICD-10-CM

## 2022-09-23 DIAGNOSIS — S52.132S: ICD-10-CM

## 2022-09-23 DIAGNOSIS — S52.042S: ICD-10-CM

## 2022-09-27 ENCOUNTER — OFFICE VISIT (OUTPATIENT)
Dept: ORTHOPEDIC SURGERY | Age: 62
End: 2022-09-27
Payer: COMMERCIAL

## 2022-09-27 VITALS — BODY MASS INDEX: 36.4 KG/M2 | HEIGHT: 71 IN | WEIGHT: 260 LBS

## 2022-09-27 DIAGNOSIS — S52.042S: ICD-10-CM

## 2022-09-27 DIAGNOSIS — S52.022S CLOSED OLECRANON FRACTURE, LEFT, SEQUELA: Primary | ICD-10-CM

## 2022-09-27 DIAGNOSIS — S52.132S: ICD-10-CM

## 2022-09-27 DIAGNOSIS — G56.82 OTHER SPECIFIED MONONEUROPATHIES OF LEFT UPPER LIMB: ICD-10-CM

## 2022-09-27 PROCEDURE — 3017F COLORECTAL CA SCREEN DOC REV: CPT | Performed by: PHYSICIAN ASSISTANT

## 2022-09-27 PROCEDURE — 99213 OFFICE O/P EST LOW 20 MIN: CPT | Performed by: PHYSICIAN ASSISTANT

## 2022-09-27 PROCEDURE — 1036F TOBACCO NON-USER: CPT | Performed by: PHYSICIAN ASSISTANT

## 2022-09-27 PROCEDURE — G8427 DOCREV CUR MEDS BY ELIG CLIN: HCPCS | Performed by: PHYSICIAN ASSISTANT

## 2022-09-27 PROCEDURE — G8417 CALC BMI ABV UP PARAM F/U: HCPCS | Performed by: PHYSICIAN ASSISTANT

## 2022-09-27 NOTE — PROGRESS NOTES
Take 1 capsule by mouth 2 times daily as needed for Constipation 180 capsule 1    fluticasone (FLONASE) 50 MCG/ACT nasal spray PLACE 1 OR 2 SPRAYS INTO EACH NOSTRIL ONCE DAILY AS DIRECTED 16 g 3    albuterol sulfate HFA (VENTOLIN HFA) 108 (90 Base) MCG/ACT inhaler Inhale 2 puffs into the lungs 4 times daily as needed for Wheezing 18 g 3    EPINEPHrine (EPIPEN 2-EVANGELISTA) 0.3 MG/0.3ML SOAJ injection Inject 0.3 mLs into the muscle as needed (bee) Use as directed for allergic reaction 1 each 1    ciclopirox (PENLAC) 8 % solution Apply topically nightly. 1 Bottle 2    calcium carbonate (CALCIUM 600) 600 MG TABS tablet Take 1 tablet by mouth daily 90 tablet 1    clobetasol (TEMOVATE) 0.05 % ointment Apply topically 2 times daily. 60 g 2    leuprolide acetate, 4 Month, (ELIGARD) 30 MG injection Inject into the skin every 6 months      XTANDI 40 MG capsule Indications: taking 4 pills at once daily       tamsulosin (FLOMAX) 0.4 MG capsule       ketorolac (ACULAR) 0.5 % ophthalmic solution Place 1 drop into both eyes 4 times daily 1 Bottle 0    triamcinolone (KENALOG) 0.1 % cream Apply topically 2 times daily. 45 g 0    desonide (DESOWEN) 0.05 % cream Apply to affected area on face and neck twice daily, 5 days on 2 days off, as needed. fluocinonide (LIDEX) 0.05 % cream Apply to affected area twice daily, 5 days on 2 days off, as needed. Not for face, armpits, or groin. Psyllium (METAMUCIL FIBER PO) Take by mouth      amphetamine-dextroamphetamine (ADDERALL) 30 MG tablet Take 30 mg by mouth as needed. Indications: take 1 tablet in the afternoon       lisdexamfetamine (VYVANSE) 70 MG capsule Take 1 capsule by mouth daily. mineral oil-hydrophilic petrolatum (AQUAPHOR) ointment Apply topically as needed. 396 g 3     No current facility-administered medications for this visit.      Allergies: Bee venom, Dye [iodides], Iodine solution [povidone iodine], Sulfa antibiotics, and Topical sulfur  Past Medical History: Diagnosis Date    Acute drug-induced gout of left foot 8/29/2015    Had gout attack on 7/18/15. Seems to be HCTZ induced.       ADHD (attention deficit hyperactivity disorder)     Back pain, chronic     Depression     Hyperlipidemia     Hypertension     Left elbow fracture 10/27/2015    MRSA (methicillin resistant staph aureus) culture positive     Seasonal allergies      Past Surgical History:   Procedure Laterality Date    APPENDECTOMY  1972    COLECTOMY Bilateral 02/2019    COLONOSCOPY  2/1/2013    sigmoid diverticulosis, Dr. Courtney Mayfield, [facility]    COLONOSCOPY  02/01/2013    ENDOSCOPIC ULTRASOUND (LOWER) Bilateral 02/2019    upper    FOOT SURGERY  1990s    RIGHT DUE TO FX    LUMBAR DISCECTOMY  2007    [facility]     OTHER SURGICAL HISTORY  03/28/2018    removal and replacement of spinal cord stimulator    [place] IMPLANT NEUROSTIM/ N/A 3/28/2018    REMOVAL OF T7 - T8 MALFUCTIONING  STIMULATOR, REPLACEMENT OF T7- T8 SPINAL CORD STIMULATOR --LIDIA, CASTILLO TABLE, MEDTRONIC performed by Gayle Samaniego MD at [address]  2009    insertion of spinal cord stimulator and since then has had 8 different surgeries to take it out and put it in due to MRSA infections    SPINE SURGERY  10/11/2011    INSERTION OF [address]  6/2012    change of spinal cord stimulator, Dr. Pratik Dorman, [address]     Family History   Problem Relation Age of Onset    Heart Failure Mother         CABG at 61     Heart Disease Mother     High Blood Pressure Mother     Asthma Mother     Heart Attack Mother     Heart Failure Father         CABG at 63's    Hypertension Father     Diabetes Father     Heart Disease Father     High Blood Pressure Father     Heart Attack Father     Heart Attack Sister 62    Heart Attack Brother 62     Social History     Tobacco Use    Smoking status: Never    Smokeless tobacco: Never   Substance Use Topics    Alcohol use: Yes     Comment: rare Chief Complaint   Patient presents with    New Patient     L Elbow pain. . SX (10/28/15). . Pt states his left elbow was smashed so he has hardware from surgery. . 2 months ago it started bothering him. . initially thought it was neck issues but realized it's coming from his elbow. . he now has pain radiating to the hand and up to the shoulder. . ice/heat helps minimally. . N&T, rates pain 8/10 at times, or 4/10. Romero Gutter worst at night, even keeps him up. . he has tried lidocaine patches, tenze unit. . he states he has exhausted all methods he knows of and wanted reevaluated. DATE OF PROCEDURE:  10/28/2015     SURGEON:  Derek Fernandez D.O.      OPERATION:  (1)  Left olecranon, proximal ulna, and coronoid fractures open reduction with internal fixation. (2)  Left radial head fracture excision with radial head arthroplasty. SUBJECTIVE: patient is a 64 yo male with hx of the above surgery who states for the past several years he has been doing fine up until a few months ago after using a vibrating saw he began to experience worsening ulnar distributed neuropathy mostly radiating from his elbow, but questionably radiating from his neck as well. No specific injuries or new trauma. No significant pain, but mostly just paresthesias at this point. Denies any mechanical sxs or loss of ROM of the elbow. No increasing pain with ROM, although he sometimes has increasing paresthesias with flexion of the elbow. He has tried otc acetaminophen, NSAIDs, topical patches and creams with mild relief, but this returns after discontinuing multimodal pain control. Review of Systems   Constitutional: Negative for fever, chills, diaphoresis, appetite change and fatigue. HENT: Negative for dental issues, hearing loss and tinnitus. Negative for congestion, sinus pressure, sneezing, sore throat. Negative for headache. Eyes: Negative for visual disturbance, blurred and double vision.  Negative for pain, discharge, redness and itching  Respiratory: Negative for cough, shortness of breath and wheezing. Cardiovascular: Negative for chest pain, palpitations and leg swelling. No dyspnea on exertion   Gastrointestinal:   Negative for nausea, vomiting, abdominal pain, diarrhea, constipation  or black or bloody. Hematologic\Lymphatic:  negative for bleeding, petechiae,   Genitourinary: Negative for hematuria and difficulty urinating. Musculoskeletal: Negative for neck pain and stiffness. Negative for back pain, see HPI  Skin: Negative for pallor, rash and wound. Neurological: Negative for dizziness, tremors, seizures, weakness, light-headedness, no TIA or stroke symptoms. No numbness and headaches. Psychiatric/Behavioral: Negative. OBJECTIVE:      Physical Examination:   General appearance: alert, well appearing, and in no distress,  normal appearing weight. No visible signs of trauma   Mental status: alert, oriented to person, place, and time, normal mood, behavior, speech, dress, motor activity, and thought processes  Abdomen: soft, nondistended  Resp:   resp easy and unlabored, no audible wheezes note, normal symmetrical expansion of both hemithoraces  Cardiac: distal pulses palpable, skin and extremities well perfused  Neurological: alert, oriented X3, normal speech, no focal findings or movement disorder noted, motor and sensory grossly normal bilaterally, normal muscle tone, no tremors, strength 5/5, normal gait and station  HEENT: normochephalic atraumatic, external ears and eyes normal, sclera normal, neck supple, no nasal discharge.    Extremities:   peripheral pulses normal, no edema, redness or tenderness in the calves   Skin: normal coloration, no rashes or open wounds, no suspicious skin lesions noted  Psych: Affect euthymic   Musculoskeletal:   Extremity:  Left Upper Extremity  Skin is clean dry and intact  No edema noted  Radial pulse palpable, fingers warm with BCR  Flex/extension intact to wrist, thumb and fingers  Finger opposition intact  Finger adduction/abduction intact  Finger crossover intact  Subjectively states sensation intact to radial/medial/ulnar distribution  Incisions healed  Nontender throughout the elbow  Spurling + for radiculopathy from the neck to the fingers   Carpal and cubital tunnel tinels test +, vikash +,  strength 4/5, no significant thenar or hypothenar atrophy      Ht 5' 11\" (1.803 m)   Wt 260 lb (117.9 kg) Comment: per pt  BMI 36.26 kg/m²      XR: 9/27/22     3 views of L elbow demonstrating slight backout of radial head arthroplasty, unchanged from previous XR several years ago. Ulna appears healed with plate intact and stable. No significant change in alignment. No acute fractures or dislocations or any other osseus abnormality identified. ASSESSMENT:     Diagnosis Orders   1. Closed olecranon fracture, left, sequela  EMG      2. Closed traumatic displaced fracture of coronoid process of left ulna, sequela  EMG      3. Closed displaced fracture of neck of left radius, sequela  EMG      4. Other specified mononeuropathies of left upper limb   EMG          PLAN:    Reviewed XR   WBAT L UE  L UE EMG ordered  Discussed probable ulnar n. Neuropathy - will eval with EMG before considering referral to upper extremity specialist for surgical options vs orthotics for bracing. F/u pending EMG results       Electronically signed by Oscar Bolaños PA-C on 9/27/2022 at 11:18 AM  Note: This report was completed using computerize voiced recognition software. Every effort has been made to ensure accuracy; however, inadvertent computerized transcription errors may be present.

## 2022-11-15 ENCOUNTER — HOSPITAL ENCOUNTER (OUTPATIENT)
Dept: NEUROLOGY | Age: 62
Discharge: HOME | End: 2022-11-15
Payer: COMMERCIAL

## 2022-11-15 VITALS — WEIGHT: 260 LBS | HEIGHT: 71 IN | BODY MASS INDEX: 36.4 KG/M2

## 2022-11-15 DIAGNOSIS — S52.132S: ICD-10-CM

## 2022-11-15 DIAGNOSIS — G56.82 OTHER SPECIFIED MONONEUROPATHIES OF LEFT UPPER LIMB: ICD-10-CM

## 2022-11-15 DIAGNOSIS — S52.042S: ICD-10-CM

## 2022-11-15 DIAGNOSIS — S52.022S CLOSED OLECRANON FRACTURE, LEFT, SEQUELA: ICD-10-CM

## 2022-11-15 PROCEDURE — 95911 NRV CNDJ TEST 9-10 STUDIES: CPT

## 2022-11-15 PROCEDURE — 95886 MUSC TEST DONE W/N TEST COMP: CPT

## 2022-11-15 NOTE — PROCEDURES
[address] [facility]  [address], [address]  Phone: [phone]  Fax: [phone]      Referring Provider: Chiqui Gifford*  Primary Care Physician: Vladimir Tanner DO  Patient Name: Slim Snow  Patient YOB: 1960  Gender: male  BMI: Body mass index is 36.26 kg/m². Height 5' 11\" (1.803 m), weight 260 lb (117.9 kg). 11/15/2022    Reason for referral: History of left olecranon fracture, displaced fracture of coronoid process of left ulna, displaced fracture of neck of left radius -- s/p (1)  Left olecranon, proximal ulna, and coronoid fractures open reduction with internal fixation. (2)  Left radial head fracture excision with radial head arthroplasty performed October 2015    Description of clinical problem:   Patient reports history of the above listed left upper extremity fractures and surgical intervention October 2015. Patient states that he was doing fine up until a few months ago when he began having discomfort and paresthesias radiating from the left elbow to the left hand (digits 4-5),  as well as up to the neck. No reported weakness. No new injury reported. He states that over the last 3-4 weeks the symptoms are beginning to improve. Pain Yes   ; Numbness/tingling  Yes; Weakness  No       Brief physical exam:   Sensory deficit No; Weakness No; Atrophy  No      Study Limitations:  None    Motor NCS      Nerve / Sites Lat. Lat Diff Amplitude Amp. 1-2 Distance Velocity Temp.    ms ms mV % cm m/s °C   L Median - APB      Wrist 4.22  5.4 100 8  32      Elbow 8.33 4.11 5.1 93.1 21 51 32.4   L Ulnar - ADM      Wrist 2.45  12.6 100 8  32.4      B. Elbow 6.30 3.85 10.8 85.4 21 54 32.5      A. Elbow 7.97 1.67 10.3 82.1 10 60 32.5       Sensory NCS      Nerve / Sites Onset Lat Peak Lat PP Amp Distance Velocity Temp.    ms ms µV cm m/s °C   L Median - Digit II (Antidromic)      Mid Palm 1.25 1.67 58.7 7 56 32.2      Wrist 2. 76 3.59 44.1 14 51 32.2   L Ulnar - Digit V (Antidromic)      Wrist 2.14 2.86 46.7 14 66 32.1   L Radial - Anatomical snuff box (Forearm)      Forearm 1.51 2.19 29.8 10 66 32.1   L Dorsal ulnar cutaneous - Hand dorsum (Forearm)      Forearm 1.77 2.40 15.3 8 45 32                           Combined Sensory Index      Nerve / Sites Rec. Site Peak Lat NP Amp PP Amp Segments Peak Diff Temp. ms µV µV  ms °C   L Median - CSI      Median Thumb 3.23 19.5 30.1 Median - Radial 1.09 32.1      Radial Thumb 2.14 14.8 19.8 Median - Ulnar 0.73 32.1      Median Ring 3.54 7.9 17.5 Median palm - Ulnar palm 0.73 32.1      Ulnar Ring 2.81 17.3 19.3         Median palm Wrist 2.19 73.2 82.4         Ulnar palm Wrist 1.46 12.6 12.5         CSI     CSI 2.55          F  Wave      Nerve F Lat M Lat F-M Lat    ms ms ms   L Median - APB 28.1 4.2 23.9   L Ulnar - ADM 28.1 2.8 25.4       EMG         EMG Summary Table     Spontaneous MUAP Recruitment   Muscle IA Fib PSW Fasc H.F. Amp Dur. PPP Pattern   L. Deltoid N None None None None N N N N   L. Biceps brachii N None None None None N N N N   L. Triceps brachii N None None None None N N N N   L. Pronator teres N None None None None N N N N   L. Flexor carpi ulnaris N None None None None N N N N   L. First dorsal interosseous N None None None None N N N N   L. Abductor pollicis brevis N None None None None N N N N   L. Cervical paraspinals (mid) N None None None None N N N N   L. Cervical paraspinals (low) Sl Incr None 2+ None None N N N N          Motor NCS      Nerve / Sites Lat. Lat Diff Amplitude Amp. 1-2 Distance Velocity Temp.    ms ms mV % cm m/s °C   L Median - APB      Wrist 4.22  5.4 100 8  32      Elbow 8.33 4.11 5.1 93.1 21 51 32.4   L Ulnar - ADM      Wrist 2.45  12.6 100 8  32.4      B. Elbow 6.30 3.85 10.8 85.4 21 54 32.5      A. Elbow 7.97 1.67 10.3 82.1 10 60 32.5       Sensory NCS      Nerve / Sites Onset Lat Peak Lat PP Amp Distance Velocity Temp.    ms ms µV cm m/s °C   L Median - Digit II (Antidromic)      Mid Palm 1.25 1.67 58.7 7 56 32.2      Wrist 2.76 3.59 44.1 14 51 32.2   L Ulnar - Digit V (Antidromic)      Wrist 2.14 2.86 46.7 14 66 32.1   L Radial - Anatomical snuff box (Forearm)      Forearm 1.51 2.19 29.8 10 66 32.1   L Dorsal ulnar cutaneous - Hand dorsum (Forearm)      Forearm 1.77 2.40 15.3 8 45 32         Combined Sensory Index      Nerve / Sites Rec. Site Peak Lat NP Amp PP Amp Segments Peak Diff Temp. ms µV µV  ms °C   L Median - CSI      Median Thumb 3.23 19.5 30.1 Median - Radial 1.09 32.1      Radial Thumb 2.14 14.8 19.8 Median - Ulnar 0.73 32.1      Median Ring 3.54 7.9 17.5 Median palm - Ulnar palm 0.73 32.1      Ulnar Ring 2.81 17.3 19.3         Median palm Wrist 2.19 73.2 82.4         Ulnar palm Wrist 1.46 12.6 12.5         CSI     CSI 2.55        F  Wave      Nerve F Lat M Lat F-M Lat    ms ms ms   L Median - APB 28.1 4.2 23.9   L Ulnar - ADM 28.1 2.8 25.4       EMG         EMG Summary Table     Spontaneous MUAP Recruitment   Muscle IA Fib PSW Fasc H.F. Amp Dur. PPP Pattern   L. Deltoid N None None None None N N N N   L. Biceps brachii N None None None None N N N N   L. Triceps brachii N None None None None N N N N   L. Pronator teres N None None None None N N N N   L. Flexor carpi ulnaris N None None None None N N N N   L. First dorsal interosseous N None None None None N N N N   L. Abductor pollicis brevis N None None None None N N N N   L. Cervical paraspinals (mid) N None None None None N N N N   L. Cervical paraspinals (low) Sl Incr None 2+ None None N N N N              Summary of Findings:   Nerve conduction studies: The following nerve conduction studies were abnormal:   ***  All other nerve conduction studies listed in the table above were normal in latency, amplitude and conduction velocity. Needle EMG:   Needle EMG was performed using a *** needle.   The following abnormalities were seen on needle EMG: ***  All other muscles tested, as listed in the table above demonstrated normal amplitude, duration, phases and recruitment and no active denervation signs were seen. Diagnostic Interpretation: This study was {Normal/Abnormal:7854759442}. Electrodiagnosis: There is electrodiagnostic evidence of a***. Location: {RIGHT/LEFT/MONIE:20308} {SYMMETRIC/ASYMMETRIC:217342726}; {Desc; prox/mid/distal:99931}, at the ***. Nature: [  ] Axonal   [  ] Demyelinating  [  ] Mixed axonal and demyelinating     [  ] Sensory [  ] Motor               [  ] Mixed sensorimotor     [  ] with active denervation       [  ] without active denervation  Duration: {Desc; acute/subacute/chronic:20462}  Severity: {MILD/MODERATE/SEVERE:21011}  Prognosis: {Prognosis:20761}    Previous Study: There {IS/IS WTT:49492} a prior study for comparison. Date: ***. Provider: ***. Compared to prior study, today's study showed***. Follow up EMG is recommended if ***. Technologist: ***  Physician:***    Nerve conduction studies and electromyography were performed according to our laboratory policies and procedures which can be provided upon request. All abnormal values are identified in the table.  Laboratory normal values can also be provided upon request.       Cc: Leigh Kapoor DO

## 2022-11-16 DIAGNOSIS — I10 ESSENTIAL HYPERTENSION: ICD-10-CM

## 2022-11-16 RX ORDER — HYDRALAZINE HYDROCHLORIDE 10 MG/1
10 TABLET, FILM COATED ORAL 3 TIMES DAILY
Qty: 90 TABLET | Refills: 1 | Status: SHIPPED
Start: 2022-11-16 | End: 2022-11-30 | Stop reason: SDUPTHER

## 2022-11-30 ENCOUNTER — OFFICE VISIT (OUTPATIENT)
Dept: FAMILY MEDICINE CLINIC | Age: 62
End: 2022-11-30
Payer: COMMERCIAL

## 2022-11-30 VITALS
HEART RATE: 92 BPM | DIASTOLIC BLOOD PRESSURE: 91 MMHG | HEIGHT: 71 IN | OXYGEN SATURATION: 97 % | SYSTOLIC BLOOD PRESSURE: 151 MMHG | TEMPERATURE: 97.2 F | RESPIRATION RATE: 20 BRPM | WEIGHT: 270 LBS | BODY MASS INDEX: 37.8 KG/M2

## 2022-11-30 DIAGNOSIS — E55.9 VITAMIN D DEFICIENCY: ICD-10-CM

## 2022-11-30 DIAGNOSIS — I10 ESSENTIAL HYPERTENSION: ICD-10-CM

## 2022-11-30 DIAGNOSIS — E78.2 MIXED HYPERLIPIDEMIA: Primary | ICD-10-CM

## 2022-11-30 DIAGNOSIS — Z13.29 SCREENING FOR THYROID DISORDER: ICD-10-CM

## 2022-11-30 DIAGNOSIS — R73.9 HYPERGLYCEMIA: ICD-10-CM

## 2022-11-30 PROCEDURE — 3078F DIAST BP <80 MM HG: CPT | Performed by: NEUROMUSCULOSKELETAL MEDICINE & OMM

## 2022-11-30 PROCEDURE — G8427 DOCREV CUR MEDS BY ELIG CLIN: HCPCS | Performed by: NEUROMUSCULOSKELETAL MEDICINE & OMM

## 2022-11-30 PROCEDURE — 3017F COLORECTAL CA SCREEN DOC REV: CPT | Performed by: NEUROMUSCULOSKELETAL MEDICINE & OMM

## 2022-11-30 PROCEDURE — G8417 CALC BMI ABV UP PARAM F/U: HCPCS | Performed by: NEUROMUSCULOSKELETAL MEDICINE & OMM

## 2022-11-30 PROCEDURE — G8484 FLU IMMUNIZE NO ADMIN: HCPCS | Performed by: NEUROMUSCULOSKELETAL MEDICINE & OMM

## 2022-11-30 PROCEDURE — 99214 OFFICE O/P EST MOD 30 MIN: CPT | Performed by: NEUROMUSCULOSKELETAL MEDICINE & OMM

## 2022-11-30 PROCEDURE — 1036F TOBACCO NON-USER: CPT | Performed by: NEUROMUSCULOSKELETAL MEDICINE & OMM

## 2022-11-30 PROCEDURE — 3074F SYST BP LT 130 MM HG: CPT | Performed by: NEUROMUSCULOSKELETAL MEDICINE & OMM

## 2022-11-30 RX ORDER — HYDRALAZINE HYDROCHLORIDE 10 MG/1
10 TABLET, FILM COATED ORAL 3 TIMES DAILY
Qty: 270 TABLET | Refills: 1 | Status: SHIPPED | OUTPATIENT
Start: 2022-11-30 | End: 2023-02-28

## 2022-11-30 RX ORDER — METOPROLOL SUCCINATE 50 MG/1
50 TABLET, EXTENDED RELEASE ORAL 2 TIMES DAILY
Qty: 180 TABLET | Refills: 1 | Status: SHIPPED | OUTPATIENT
Start: 2022-11-30 | End: 2023-02-28

## 2022-11-30 SDOH — ECONOMIC STABILITY: FOOD INSECURITY: WITHIN THE PAST 12 MONTHS, YOU WORRIED THAT YOUR FOOD WOULD RUN OUT BEFORE YOU GOT MONEY TO BUY MORE.: NEVER TRUE

## 2022-11-30 SDOH — ECONOMIC STABILITY: FOOD INSECURITY: WITHIN THE PAST 12 MONTHS, THE FOOD YOU BOUGHT JUST DIDN'T LAST AND YOU DIDN'T HAVE MONEY TO GET MORE.: NEVER TRUE

## 2022-11-30 ASSESSMENT — ENCOUNTER SYMPTOMS
DIARRHEA: 0
NAUSEA: 0
VOMITING: 0
CHEST TIGHTNESS: 0
ABDOMINAL DISTENTION: 0
ABDOMINAL PAIN: 0
COUGH: 0
CHOKING: 0
CONSTIPATION: 0
SHORTNESS OF BREATH: 0

## 2022-11-30 ASSESSMENT — SOCIAL DETERMINANTS OF HEALTH (SDOH): HOW HARD IS IT FOR YOU TO PAY FOR THE VERY BASICS LIKE FOOD, HOUSING, MEDICAL CARE, AND HEATING?: NOT HARD AT ALL

## 2022-11-30 NOTE — PROGRESS NOTES
Isela Pham (:  1960) is a 63 y.o. male,Established patient, here for evaluation of the following chief complaint(s):  Hypertension (6 mo follow up/)         ASSESSMENT/PLAN:  1. Mixed hyperlipidemia  Assessment & Plan:  Patient on Lipitor 10 mg daily. We will recheck lipid panel with upcoming fasting blood work. Orders:  -     Lipid Panel; Future  2. Essential hypertension  Assessment & Plan:   Suboptimal control today at 151/91 we will continue him on hydralazine and Toprol-XL may need to add Norvasc at a later date. I told patient to check blood pressure ambulatory 4-5 times a week and call with results. Orders:  -     hydrALAZINE (APRESOLINE) 10 MG tablet; Take 1 tablet by mouth 3 times daily, Disp-270 tablet, R-1Normal  -     metoprolol succinate (TOPROL XL) 50 MG extended release tablet; Take 1 tablet by mouth 2 times daily, Disp-180 tablet, R-1Normal  3. Vitamin D deficiency  Assessment & Plan:   Patient's last vitamin D level in 2021 was 40  Orders:  -     CBC with Auto Differential; Future  -     Vitamin D 25 Hydroxy; Future  4. Screening for thyroid disorder  -     TSH; Future  5. Hyperglycemia  -     Comprehensive Metabolic Panel; Future  -     Hemoglobin A1C; Future      Return in about 13 weeks (around 3/1/2023) for Annual Wellness Exam.         Subjective   SUBJECTIVE/OBJECTIVE:  HPI 29-year-old male here for medication refills on blood pressure pills. I sent in hydralazine 10 mg 1 by mouth 3 times a day, dispense 270 with 1 refill, and Toprol XL dispense 180 with 3 refills. We discussed at length diet and order for weight loss to better control his blood pressure. He is going to try a starvation diet. His BMI at today's visit is 37.66 with a blood pressure reading of 151/91. I also wrote for fasting blood work including CBC, CMP, hemoglobin A1c, lipid panel, TSH, and vitamin D level.   Patient with history of prostate CA he is now on hormonal treatment with Dr. Cathie Matias Findings: No rash. Neurological:      General: No focal deficit present. Mental Status: He is alert and oriented to person, place, and time. Mental status is at baseline. Psychiatric:         Mood and Affect: Mood normal.         Behavior: Behavior normal.           Past Medical History:   Diagnosis Date    Acute drug-induced gout of left foot 8/29/2015    Had gout attack on 7/18/15. Seems to be HCTZ induced.       ADHD (attention deficit hyperactivity disorder)     Back pain, chronic     Depression     Hyperlipidemia     Hypertension     Left elbow fracture 10/27/2015    MRSA (methicillin resistant staph aureus) culture positive     Seasonal allergies         Past Surgical History:   Procedure Laterality Date    APPENDECTOMY  1972    COLECTOMY Bilateral 02/2019    COLONOSCOPY  2/1/2013    sigmoid diverticulosis, Dr. Teetee Kwong, [facility]    COLONOSCOPY  02/01/2013    ENDOSCOPIC ULTRASOUND (LOWER) Bilateral 02/2019    upper    FOOT SURGERY  1990s    RIGHT DUE TO FX    LUMBAR DISCECTOMY  2007    [facility]     OTHER SURGICAL HISTORY  03/28/2018    removal and replacement of spinal cord stimulator    [place] IMPLANT NEUROSTIM/ N/A 3/28/2018    REMOVAL OF T7 - T8 MALFUCTIONING  STIMULATOR, REPLACEMENT OF T7- T8 SPINAL CORD STIMULATOR --CASTILLO MURILLO TABLE, [employer] performed by Odessa Williamson MD at [facility] [address]  2009    insertion of spinal cord stimulator and since then has had 8 different surgeries to take it out and put it in due to MRSA infections    SPINE SURGERY  10/11/2011    INSERTION OF [address]  6/2012    change of spinal cord stimulator, Dr. Janay Owens, [address]        The 10-year ASCVD risk score (Romain VILLALOBOS, et al., 2019) is: 12.6%    Values used to calculate the score:      Age: 63 years      Sex: Male      Is Non- : No      Diabetic: No      Tobacco smoker: No      Systolic Blood Pressure: 353 mmHg      Is BP treated: Yes      HDL Cholesterol: 42 mg/dL      Total Cholesterol: 165 mg/dL     Educational materials and/or home exercises printed for patient's review and were included inpatient instructions on his/her After Visit Summary and given to patient at the end of visit.     regarding above diagnosis, including possible risks and complications,  especially if left uncontrolled. Counseled regarding the possible side effects, risks, benefits and alternatives to treatment; patient and/or guardian verbalizes understanding, agrees, feels comfortable with and wishes to proceed withabove treatment plan. Advised patient to call with any new medication issues, and read all Rx infofrom pharmacy to assure aware of all possible risks and side effects of medication before taking. age and gender appropriate health screening exams and vaccinations. Advised patient regarding importance of keeping up with recommended health maintenance and to schedule as soon as possible if overdue, as this isimportant in assessing for undiagnosed pathology, especially cancer, as well as protecting against potentially harmful/life threatening disease. Patient and/or guardian verbalizes understanding andagrees with above counseling, assessment and plan. All questions answered. An electronic signature was used to authenticate this note.     --Eneida Sumner, DO

## 2022-12-01 NOTE — ASSESSMENT & PLAN NOTE
Suboptimal control today at 151/91 we will continue him on hydralazine and Toprol-XL may need to add Norvasc at a later date. I told patient to check blood pressure ambulatory 4-5 times a week and call with results.

## 2023-01-19 ENCOUNTER — OFFICE VISIT (OUTPATIENT)
Dept: PRIMARY CARE CLINIC | Age: 63
End: 2023-01-19
Payer: COMMERCIAL

## 2023-01-19 VITALS — DIASTOLIC BLOOD PRESSURE: 99 MMHG | TEMPERATURE: 97.9 F | SYSTOLIC BLOOD PRESSURE: 165 MMHG | HEART RATE: 86 BPM

## 2023-01-19 DIAGNOSIS — H10.501 BLEPHAROCONJUNCTIVITIS OF RIGHT EYE, UNSPECIFIED BLEPHAROCONJUNCTIVITIS TYPE: Primary | ICD-10-CM

## 2023-01-19 PROCEDURE — G8427 DOCREV CUR MEDS BY ELIG CLIN: HCPCS | Performed by: NURSE PRACTITIONER

## 2023-01-19 PROCEDURE — G8417 CALC BMI ABV UP PARAM F/U: HCPCS | Performed by: NURSE PRACTITIONER

## 2023-01-19 PROCEDURE — 96372 THER/PROPH/DIAG INJ SC/IM: CPT | Performed by: NURSE PRACTITIONER

## 2023-01-19 PROCEDURE — 3078F DIAST BP <80 MM HG: CPT | Performed by: NURSE PRACTITIONER

## 2023-01-19 PROCEDURE — 3017F COLORECTAL CA SCREEN DOC REV: CPT | Performed by: NURSE PRACTITIONER

## 2023-01-19 PROCEDURE — 1036F TOBACCO NON-USER: CPT | Performed by: NURSE PRACTITIONER

## 2023-01-19 PROCEDURE — G8484 FLU IMMUNIZE NO ADMIN: HCPCS | Performed by: NURSE PRACTITIONER

## 2023-01-19 PROCEDURE — 99213 OFFICE O/P EST LOW 20 MIN: CPT | Performed by: NURSE PRACTITIONER

## 2023-01-19 PROCEDURE — 3074F SYST BP LT 130 MM HG: CPT | Performed by: NURSE PRACTITIONER

## 2023-01-19 RX ORDER — PREDNISONE 10 MG/1
10 TABLET ORAL 2 TIMES DAILY
Qty: 6 TABLET | Refills: 0 | Status: SHIPPED | OUTPATIENT
Start: 2023-01-19 | End: 2023-01-22

## 2023-01-19 RX ORDER — ERYTHROMYCIN 5 MG/G
OINTMENT OPHTHALMIC
Qty: 3.5 G | Refills: 0 | Status: SHIPPED | OUTPATIENT
Start: 2023-01-19 | End: 2023-01-29

## 2023-01-19 RX ORDER — DEXAMETHASONE SODIUM PHOSPHATE 10 MG/ML
10 INJECTION INTRAMUSCULAR; INTRAVENOUS ONCE
Status: COMPLETED | OUTPATIENT
Start: 2023-01-19 | End: 2023-01-19

## 2023-01-19 RX ORDER — CEPHALEXIN 500 MG/1
500 CAPSULE ORAL 3 TIMES DAILY
Qty: 30 CAPSULE | Refills: 0 | Status: SHIPPED | OUTPATIENT
Start: 2023-01-19 | End: 2023-01-29

## 2023-01-19 RX ADMIN — DEXAMETHASONE SODIUM PHOSPHATE 10 MG: 10 INJECTION INTRAMUSCULAR; INTRAVENOUS at 16:43

## 2023-01-19 NOTE — PROGRESS NOTES
Chief Complaint:   Eye Problem (Right eye)      History of Present Illness   Source of history provided by:  patient. Brtitnee Franklin is a 63 y.o. old male with a past medical history of:   Past Medical History:   Diagnosis Date    Acute drug-induced gout of left foot 8/29/2015    Had gout attack on 7/18/15. Seems to be HCTZ induced. ADHD (attention deficit hyperactivity disorder)     Back pain, chronic     Depression     Hyperlipidemia     Hypertension     Left elbow fracture 10/27/2015    MRSA (methicillin resistant staph aureus) culture positive     Seasonal allergies         Pt presents to the [facility] care with right eye lid swelling/redness for the past 1 day. No  fever noted. Pt denies any recent eye/facial injuries. Denies any N/V/D, visual deficits, eye drainage, eye pain, sinus congestion, abdominal pain, CP, progressive SOB, dizziness, or lethargy. Pt does not wear contact lenses      ROS    Unless otherwise stated in this report or unable to obtain because of the patient's clinical or mental status as evidenced by the medical record, this patients's positive and negative responses for Review of Systems, constitutional, psych, eyes, ENT, cardiovascular, respiratory, gastrointestinal, neurological, genitourinary, musculoskeletal, integument systems and systems related to the presenting problem are either stated in the preceding or were not pertinent or were negative for the symptoms and/or complaints related to the medical problem. Past Surgical History:  has a past surgical history that includes Appendectomy (1972); Tonsillectomy (1972); Foot surgery (1990s); lumbar discectomy (2007); Spine surgery (2009); Spine surgery (10/11/2011); Spine surgery (6/2012); Colonoscopy (2/1/2013);  Colonoscopy (02/01/2013); other surgical history (03/28/2018); pr insj/rplcmt spi npgr dir/induxive coupling (N/A, 3/28/2018); colectomy (Bilateral, 02/2019); and Endoscopic ultrasonography, GI (Bilateral, 02/2019). Social History:  reports that he has never smoked. He has never used smokeless tobacco. He reports current alcohol use. He reports current drug use. Family History: family history includes Asthma in his mother; Diabetes in his father; Heart Attack in his father and mother; Heart Attack (age of onset: 62) in his brother and sister; Heart Disease in his father and mother; Heart Failure in his father and mother; High Blood Pressure in his father and mother; Hypertension in his father. Allergies: Bee venom, Dye [iodides], Iodine solution [povidone iodine], Sulfa antibiotics, and Topical sulfur    Physical Exam         VS:  BP (!) 165/99   Pulse 86   Temp 97.9 °F (36.6 °C) (Temporal)    Oxygen Saturation Interpretation: Normal.    Constitutional:  Alert, development consistent with age. Eyes:  OD:  Diffuse edema to upper/lower lids, mild redness and slightly warm to touch. Conjunctiva and inner canthus slightly red, watering present. No crusting to eyelashes. PERRLA, no photophobia. OS:  normal  Ears:  External Ears: Normal bilateral pinna. TM's & External Canals: TM's normal bilaterally without perforation. Canals without erythema or drainage. Nose:   There is no obvious septal defect. Mouth:  Moist bucca mucosa and normal tongue. Throat:  No posterior pharyngeal erythema without exudates or lesions. Neck:  Supple. There is no obvious adenopathy or neck tenderness. Lungs:   Breath sounds: Normal chest expansion and breath sounds noted throughout. No wheezes, rales, or rhonchi noted. Heart:  Regular rate and rhythm, normal heart sounds, without pathological murmurs, ectopy, gallops, or rubs. Skin:  Normal turgor. Warm, dry, without visible rash. Neurological:  Oriented. Motor functions intact. Lab / Imaging Results   (All laboratory and radiology results have been personally reviewed by myself)  Labs:  No results found for this visit on 01/19/23. Imaging:   All Radiology results interpreted by Radiologist unless otherwise noted. No orders to display         Assessment / Plan     Impression(s):  1. Blepharoconjunctivitis of right eye, unspecified blepharoconjunctivitis type      Disposition:  Disposition: Decadron 10 mg IM now, start Prednisone, E Mycin ointment and Keflex as ordered. Apply cool compresses as discussed. Advised if any worsening or concerning issues, go to ED.

## 2023-02-09 DIAGNOSIS — Z76.0 MEDICATION REFILL: ICD-10-CM

## 2023-02-09 DIAGNOSIS — I10 ESSENTIAL HYPERTENSION: ICD-10-CM

## 2023-02-09 DIAGNOSIS — E78.2 MIXED HYPERLIPIDEMIA: ICD-10-CM

## 2023-02-09 DIAGNOSIS — K59.04 CHRONIC IDIOPATHIC CONSTIPATION: ICD-10-CM

## 2023-02-09 DIAGNOSIS — K21.9 GASTROESOPHAGEAL REFLUX DISEASE WITHOUT ESOPHAGITIS: ICD-10-CM

## 2023-02-09 NOTE — TELEPHONE ENCOUNTER
metoprolol succinate (TOPROL XL) 50 MG extended release tablet   Cholecalciferol (VITAMIN D3) 50 MCG (2000 UT) CAPS   atorvastatin (LIPITOR) 10 MG tablet   aspirin EC 81 MG EC tablet   docusate sodium (DOK) 100 MG capsule   famotidine (PEPCID) 40 MG tablet   losartan (COZAAR) 100 MG tablet   loratadine (CLARITIN) 10 MG tablet   90 day supply, please send to [phone] .

## 2023-02-10 RX ORDER — HYDRALAZINE HYDROCHLORIDE 10 MG/1
10 TABLET, FILM COATED ORAL 3 TIMES DAILY
Qty: 270 TABLET | Refills: 1 | Status: SHIPPED | OUTPATIENT
Start: 2023-02-10 | End: 2023-05-11

## 2023-02-10 RX ORDER — LORATADINE 10 MG/1
10 TABLET ORAL DAILY
Qty: 90 TABLET | Refills: 1 | Status: SHIPPED | OUTPATIENT
Start: 2023-02-10

## 2023-02-10 RX ORDER — DOCUSATE SODIUM 100 MG/1
100 CAPSULE, LIQUID FILLED ORAL 2 TIMES DAILY PRN
Qty: 180 CAPSULE | Refills: 1 | Status: SHIPPED | OUTPATIENT
Start: 2023-02-10

## 2023-02-10 RX ORDER — METOPROLOL SUCCINATE 50 MG/1
50 TABLET, EXTENDED RELEASE ORAL 2 TIMES DAILY
Qty: 180 TABLET | Refills: 1 | Status: SHIPPED | OUTPATIENT
Start: 2023-02-10 | End: 2023-05-11

## 2023-02-10 RX ORDER — ASPIRIN 81 MG/1
81 TABLET ORAL DAILY
Qty: 90 TABLET | Refills: 1 | Status: SHIPPED | OUTPATIENT
Start: 2023-02-10

## 2023-02-10 RX ORDER — ACETAMINOPHEN 160 MG
TABLET,DISINTEGRATING ORAL
Qty: 90 CAPSULE | Refills: 1 | Status: SHIPPED | OUTPATIENT
Start: 2023-02-10

## 2023-02-10 RX ORDER — ATORVASTATIN CALCIUM 10 MG/1
TABLET, FILM COATED ORAL
Qty: 90 TABLET | Refills: 1 | Status: SHIPPED | OUTPATIENT
Start: 2023-02-10

## 2023-02-10 RX ORDER — IBUPROFEN 800 MG/1
800 TABLET ORAL EVERY 8 HOURS PRN
Qty: 90 TABLET | Refills: 3 | Status: SHIPPED | OUTPATIENT
Start: 2023-02-10

## 2023-02-10 RX ORDER — LOSARTAN POTASSIUM 100 MG/1
TABLET ORAL
Qty: 90 TABLET | Refills: 1 | Status: SHIPPED | OUTPATIENT
Start: 2023-02-10

## 2023-02-10 RX ORDER — FAMOTIDINE 40 MG/1
40 TABLET, FILM COATED ORAL EVERY EVENING
Qty: 90 TABLET | Refills: 1 | Status: SHIPPED | OUTPATIENT
Start: 2023-02-10

## 2023-02-20 ENCOUNTER — TELEPHONE (OUTPATIENT)
Dept: ORTHOPEDIC SURGERY | Age: 63
End: 2023-02-20

## 2023-02-20 NOTE — TELEPHONE ENCOUNTER
Call placed to patient in an attempt to schedule an appointment to go over EMG results. No answer and VM box Full. Appointment tentatively scheduled at this time.       Future Appointments   Date Time Provider Sanjuanita Dueñas   3/1/2023  2:00 PM DO Fito Hernandez [facility]   3/2/2023  1:15 PM DO SE Jelly Guerrier [facility]

## 2023-03-02 ENCOUNTER — OFFICE VISIT (OUTPATIENT)
Dept: ORTHOPEDIC SURGERY | Age: 63
End: 2023-03-02
Payer: COMMERCIAL

## 2023-03-02 VITALS — HEIGHT: 71 IN | WEIGHT: 260 LBS | BODY MASS INDEX: 36.4 KG/M2

## 2023-03-02 DIAGNOSIS — G56.02 LEFT CARPAL TUNNEL SYNDROME: Primary | ICD-10-CM

## 2023-03-02 PROCEDURE — 99212 OFFICE O/P EST SF 10 MIN: CPT

## 2023-03-02 PROCEDURE — 99212 OFFICE O/P EST SF 10 MIN: CPT | Performed by: PHYSICIAN ASSISTANT

## 2023-03-02 PROCEDURE — G8417 CALC BMI ABV UP PARAM F/U: HCPCS | Performed by: PHYSICIAN ASSISTANT

## 2023-03-02 PROCEDURE — G8427 DOCREV CUR MEDS BY ELIG CLIN: HCPCS | Performed by: PHYSICIAN ASSISTANT

## 2023-03-02 PROCEDURE — L3809 WHFO W/O JOINTS PRE OTS: HCPCS

## 2023-03-02 PROCEDURE — G8484 FLU IMMUNIZE NO ADMIN: HCPCS | Performed by: PHYSICIAN ASSISTANT

## 2023-03-02 PROCEDURE — 1036F TOBACCO NON-USER: CPT | Performed by: PHYSICIAN ASSISTANT

## 2023-03-02 PROCEDURE — 3017F COLORECTAL CA SCREEN DOC REV: CPT | Performed by: PHYSICIAN ASSISTANT

## 2023-03-02 NOTE — PROGRESS NOTES
Chief Complaint   Patient presents with    Follow-up     Lt arm EMG. Feeling better, less symptoms. DATE OF PROCEDURE:  10/28/2015     SURGEON:  Jamie Tijerina D.O.      OPERATION:  (1)  Left olecranon, proximal ulna, and coronoid fractures open reduction with internal fixation. (2)  Left radial head fracture excision with radial head arthroplasty. Subjective:  [place] Antes is f/u from EMG results. States since his EMG in November 2022 his sxs have improved and now only has very mild and intermittent paresthesias to the L hand. Still having some paresthesias from the elbow radiating distally as well, but able to perform all ADLs without much of an issue. Review of Systems -  all pertinent positives and negatives in HPI. Objective:    General: Alert and oriented X 3, normocephalic atraumatic, external ears and eye normal, sclera clear, no acute distress, respirations easy and unlabored with no audible wheezes, skin warm and dry, speech and dress appropriate for noted age, affect euthymic. Extremity:  Left Upper Extremity  Skin is clean dry and intact  Radial pulse palpable, fingers warm with BCR  Flex/extension intact to elbow, thumb and fingers  Finger opposition intact  Finger adduction/abduction intact  Finger crossover intact  Subjectively states sensation intact to radial/medial/ulnar distribution   strength 5/5  Trevor and tinels very mildly +  Nontender throughout the wrist    Ht 5' 11\" (1.803 m)   Wt 260 lb (117.9 kg)   BMI 36.26 kg/m²     EMG 11/15/22:    Diagnostic Interpretation: This study was Abnormal.     1. There is electrodiagnostic evidence of a focal median mononeuropathy at or about the wrist on the left, mild in degree, affecting sensory fibers, and demyelinating in nature. This is consistent with a clinical diagnosis of left carpal tunnel syndrome.      2.  There is electrodiagnostic evidence consistent with a left lower cervical motor radiculopathy, as indicated by abnormal left lower cervical paraspinal needle exam.  This was not able to be further defined as all tested left upper extremity muscles were normal.  Clinical correlation is advised. 3. There is no electrodiagnostic evidence of any other peripheral nerve mononeuropathy, plexopathy, or evidence suggestive of peripheral polyneuropathy in the left upper extremity. Cannot evaluate for pure sensory radiculopathy or small fiber neuropathy by electrodiagnostic technique. Assessment:   Diagnosis Orders   1. Left carpal tunnel syndrome            Plan:  Reviewed EMG  Wrist brace for comfort at night if needed for CTS  Discussed CSI for CTS and potential for CTR in the future if sxs progress. Currently mild sxs with 5/5  strength, so will try bracing for now. Follow up prn    Electronically signed by Kellen Laboy PA-C on 3/2/2023 at 1:44 PM  Note: This report was completed using [employer] voiced recognition software. Every effort has been made to ensure accuracy; however, inadvertent computerized transcription errors may be present.

## 2023-03-03 ENCOUNTER — OFFICE VISIT (OUTPATIENT)
Dept: FAMILY MEDICINE CLINIC | Age: 63
End: 2023-03-03
Payer: COMMERCIAL

## 2023-03-03 VITALS
TEMPERATURE: 97.4 F | HEART RATE: 76 BPM | DIASTOLIC BLOOD PRESSURE: 87 MMHG | SYSTOLIC BLOOD PRESSURE: 143 MMHG | WEIGHT: 267 LBS | OXYGEN SATURATION: 92 % | HEIGHT: 71 IN | RESPIRATION RATE: 22 BRPM | BODY MASS INDEX: 37.38 KG/M2

## 2023-03-03 DIAGNOSIS — Z23 NEED FOR PROPHYLACTIC VACCINATION AND INOCULATION AGAINST VARICELLA: ICD-10-CM

## 2023-03-03 DIAGNOSIS — I10 ESSENTIAL HYPERTENSION: ICD-10-CM

## 2023-03-03 DIAGNOSIS — E78.2 MIXED HYPERLIPIDEMIA: ICD-10-CM

## 2023-03-03 DIAGNOSIS — Z00.00 MEDICARE ANNUAL WELLNESS VISIT, SUBSEQUENT: Primary | ICD-10-CM

## 2023-03-03 DIAGNOSIS — E66.09 CLASS 2 OBESITY DUE TO EXCESS CALORIES WITHOUT SERIOUS COMORBIDITY WITH BODY MASS INDEX (BMI) OF 37.0 TO 37.9 IN ADULT: ICD-10-CM

## 2023-03-03 PROCEDURE — G8482 FLU IMMUNIZE ORDER/ADMIN: HCPCS | Performed by: NEUROMUSCULOSKELETAL MEDICINE & OMM

## 2023-03-03 PROCEDURE — 3017F COLORECTAL CA SCREEN DOC REV: CPT | Performed by: NEUROMUSCULOSKELETAL MEDICINE & OMM

## 2023-03-03 PROCEDURE — 3077F SYST BP >= 140 MM HG: CPT | Performed by: NEUROMUSCULOSKELETAL MEDICINE & OMM

## 2023-03-03 PROCEDURE — G0439 PPPS, SUBSEQ VISIT: HCPCS | Performed by: NEUROMUSCULOSKELETAL MEDICINE & OMM

## 2023-03-03 PROCEDURE — 3079F DIAST BP 80-89 MM HG: CPT | Performed by: NEUROMUSCULOSKELETAL MEDICINE & OMM

## 2023-03-03 RX ORDER — SEMAGLUTIDE 1.34 MG/ML
INJECTION, SOLUTION SUBCUTANEOUS
Qty: 1.5 ML | Refills: 5 | Status: SHIPPED | OUTPATIENT
Start: 2023-03-03

## 2023-03-03 SDOH — ECONOMIC STABILITY: FOOD INSECURITY: WITHIN THE PAST 12 MONTHS, YOU WORRIED THAT YOUR FOOD WOULD RUN OUT BEFORE YOU GOT MONEY TO BUY MORE.: NEVER TRUE

## 2023-03-03 SDOH — ECONOMIC STABILITY: HOUSING INSECURITY
IN THE LAST 12 MONTHS, WAS THERE A TIME WHEN YOU DID NOT HAVE A STEADY PLACE TO SLEEP OR SLEPT IN A SHELTER (INCLUDING NOW)?: NO

## 2023-03-03 SDOH — ECONOMIC STABILITY: FOOD INSECURITY: WITHIN THE PAST 12 MONTHS, THE FOOD YOU BOUGHT JUST DIDN'T LAST AND YOU DIDN'T HAVE MONEY TO GET MORE.: NEVER TRUE

## 2023-03-03 SDOH — ECONOMIC STABILITY: INCOME INSECURITY: HOW HARD IS IT FOR YOU TO PAY FOR THE VERY BASICS LIKE FOOD, HOUSING, MEDICAL CARE, AND HEATING?: NOT HARD AT ALL

## 2023-03-03 ASSESSMENT — PATIENT HEALTH QUESTIONNAIRE - PHQ9
10. IF YOU CHECKED OFF ANY PROBLEMS, HOW DIFFICULT HAVE THESE PROBLEMS MADE IT FOR YOU TO DO YOUR WORK, TAKE CARE OF THINGS AT HOME, OR GET ALONG WITH OTHER PEOPLE: 0
9. THOUGHTS THAT YOU WOULD BE BETTER OFF DEAD, OR OF HURTING YOURSELF: 0
6. FEELING BAD ABOUT YOURSELF - OR THAT YOU ARE A FAILURE OR HAVE LET YOURSELF OR YOUR FAMILY DOWN: 0
2. FEELING DOWN, DEPRESSED OR HOPELESS: 0
1. LITTLE INTEREST OR PLEASURE IN DOING THINGS: 0
8. MOVING OR SPEAKING SO SLOWLY THAT OTHER PEOPLE COULD HAVE NOTICED. OR THE OPPOSITE, BEING SO FIGETY OR RESTLESS THAT YOU HAVE BEEN MOVING AROUND A LOT MORE THAN USUAL: 0
SUM OF ALL RESPONSES TO PHQ9 QUESTIONS 1 & 2: 0
SUM OF ALL RESPONSES TO PHQ QUESTIONS 1-9: 0
5. POOR APPETITE OR OVEREATING: 0
SUM OF ALL RESPONSES TO PHQ QUESTIONS 1-9: 0
3. TROUBLE FALLING OR STAYING ASLEEP: 0
4. FEELING TIRED OR HAVING LITTLE ENERGY: 0
7. TROUBLE CONCENTRATING ON THINGS, SUCH AS READING THE NEWSPAPER OR WATCHING TELEVISION: 0
SUM OF ALL RESPONSES TO PHQ QUESTIONS 1-9: 0
SUM OF ALL RESPONSES TO PHQ QUESTIONS 1-9: 0

## 2023-03-03 ASSESSMENT — LIFESTYLE VARIABLES
HOW MANY STANDARD DRINKS CONTAINING ALCOHOL DO YOU HAVE ON A TYPICAL DAY: PATIENT DOES NOT DRINK
HOW OFTEN DO YOU HAVE A DRINK CONTAINING ALCOHOL: NEVER

## 2023-03-03 NOTE — PATIENT INSTRUCTIONS
Substance Use Disorder: Care Instructions  Overview     You can improve your life and health by stopping your use of alcohol or drugs. When you don't drink or use drugs, you may feel and sleep better. You may get along better with your family, friends, and coworkers. There are medicines and programs that can help with substance use disorder. How can you care for yourself at home? If you have been given medicine to help keep you sober or reduce your cravings, be sure to take it exactly as prescribed. Talk to your doctor about programs that can help you stop using drugs or drinking alcohol. Do not tempt yourself by keeping alcohol or drugs in your home. Learn how to say no when other people drink or use drugs. Or don't spend time with people who drink or use drugs. Use the time and money spent on drinking or drugs to do something fun with your family or friends. Preventing a relapse  Do not drink alcohol or use drugs at all. Using any amount of alcohol or drugs greatly increases your risk for relapse. Seek help from organizations such as Alcoholics Anonymous, Narcotics Anonymous, or treatment facilities if you feel the need to drink alcohol or use drugs again. Remember that recovery is a lifelong process. Stay away from situations, friends, or places that may lead you to drink or use drugs. Have a plan to spot and deal with relapse. Learn to recognize changes in your thinking that lead you to drink or use drugs. These are warning signs. Get help before you start to drink or use drugs again. Get help as soon as you can if you relapse. Some people make a plan with another person that outlines what they want that person to do for them if they relapse. The plan usually includes how to handle the relapse and who to notify in case of relapse. Don't give up. Remember that a relapse does not mean that you have failed. Use the experience to learn the triggers that lead you to drink or use drugs.  Then quit again. Many people have several relapses before they are able to quit for good. Follow-up care is a key part of your treatment and safety. Be sure to make and go to all appointments, and call your doctor if you are having problems. It's also a good idea to know your test results and keep a list of the medicines you take. When should you call for help? Call 911  anytime you think you may need emergency care. For example, call if:    You feel you cannot stop from hurting yourself or someone else. Call your doctor now or seek immediate medical care if:    You have serious withdrawal symptoms, such as confusion, hallucinations, severe trembling, or seizures. Watch closely for changes in your health, and be sure to contact your doctor if:    You have a relapse.     You need more help or support to stop. Where can you learn more? Go to http://Ele.me.reid.com/ and enter H573 to learn more about \"Substance Use Disorder: Care Instructions. \"  Current as of: August 2, 2022               Content Version: 13.5  © 2006-2022 [employer]. Care instructions adapted under license by [facility] ([facility]). If you have questions about a medical condition or this instruction, always ask your healthcare professional. [place] [address] any warranty or liability for your use of this information. Advance Directives: Care Instructions  Overview  An advance directive is a legal way to state your wishes at the end of your life. It tells your family and your doctor what to do if you can't say what you want. There are two main types of advance directives. You can change them any time your wishes change. Living will. This form tells your family and your doctor your wishes about life support and other treatment. The form is also called a declaration. Medical power of . This form lets you name a person to make treatment decisions for you when you can't speak for yourself.  This person is called a health care agent (health care proxy, health care surrogate). The form is also called a durable power of  for health care. If you do not have an advance directive, decisions about your medical care may be made by a family member, or by a doctor or a  who doesn't know you. It may help to think of an advance directive as a gift to the people who care for you. If you have one, they won't have to make tough decisions by themselves. For more information, including forms for your state, see the [address] website (www.caringinfo.org/planning/advance-directives/). Follow-up care is a key part of your treatment and safety. Be sure to make and go to all appointments, and call your doctor if you are having problems. It's also a good idea to know your test results and keep a list of the medicines you take. What should you include in an advance directive? Many states have a unique advance directive form. (It may ask you to address specific issues.) Or you might use a universal form that's approved by many states. If your form doesn't tell you what to address, it may be hard to know what to include in your advance directive. Use the questions below to help you get started. Who do you want to make decisions about your medical care if you are not able to? What life-support measures do you want if you have a serious illness that gets worse over time or can't be cured? What are you most afraid of that might happen? (Maybe you're afraid of having pain, losing your independence, or being kept alive by machines.)  Where would you prefer to die? (Your home? A hospital? A nursing home?)  Do you want to donate your organs when you die? Do you want certain Latter day practices performed before you die? When should you call for help? Be sure to contact your doctor if you have any questions. Where can you learn more?   Go to http://www.[employer].com/ and enter R264 to learn more about \"Advance Directives: Care Instructions. \"  Current as of: June 16, 2022               Content Version: 13.5  © 2006-2022 [employer] Care instructions adapted under license by [facility] ([facility]). If you have questions about a medical condition or this instruction, always ask your healthcare professional. [address] any warranty or liability for your use of this information. Starting a Weight Loss Plan: Care Instructions  Overview     If you're thinking about losing weight, it can be hard to know where to start. Your doctor can help you set up a weight loss plan that best meets your needs. You may want to take a class on nutrition or exercise, or you could join a weight loss support group. If you have questions about how to make changes to your eating or exercise habits, ask your doctor about seeing a registered dietitian or an exercise specialist.  It can be a big challenge to lose weight. But you don't have to make huge changes at once. Make small changes, and stick with them. When those changes become habit, add a few more changes. If you don't think you're ready to make changes right now, try to pick a date in the future. Make an appointment to see your doctor to discuss whether the time is right for you to start a plan. Follow-up care is a key part of your treatment and safety. Be sure to make and go to all appointments, and call your doctor if you are having problems. It's also a good idea to know your test results and keep a list of the medicines you take. How can you care for yourself at home? Set realistic goals. Many people expect to lose much more weight than is likely. A weight loss of 5% to 10% of your body weight may be enough to improve your health. Get family and friends involved to provide support. Talk to them about why you are trying to lose weight, and ask them to help.  They can help by participating in exercise and having meals with you, even if they may be eating something different. Find what works best for you. If you do not have time or do not like to cook, a program that offers meal replacement bars or shakes may be better for you. Or if you like to prepare meals, finding a plan that includes daily menus and recipes may be best.  Ask your doctor about other health professionals who can help you achieve your weight loss goals. A dietitian can help you make healthy changes in your diet. An exercise specialist or  can help you develop a safe and effective exercise program.  A counselor or psychiatrist can help you cope with issues such as depression, anxiety, or family problems that can make it hard to focus on weight loss. Consider joining a support group for people who are trying to lose weight. Your doctor can suggest groups in your area. Where can you learn more? Go to http://www.woods.com/ and enter U357 to learn more about \"Starting a Weight Loss Plan: Care Instructions. \"  Current as of: August 25, 2022               Content Version: 13.5  © 2006-2022 [employer]. Care instructions adapted under license by [facility] ([facility]). If you have questions about a medical condition or this instruction, always ask your healthcare professional. [place] [address] any warranty or liability for your use of this information. A Healthy Heart: Care Instructions  Your Care Instructions     Coronary artery disease, also called heart disease, occurs when a substance called plaque builds up in the vessels that supply oxygen-rich blood to your heart muscle. This can narrow the blood vessels and reduce blood flow. A heart attack happens when blood flow is completely blocked. A high-fat diet, smoking, and other factors increase the risk of heart disease. Your doctor has found that you have a chance of having heart disease. You can do lots of things to keep your heart healthy.  It may not be easy, but you can change your diet, exercise more, and quit smoking. These steps really work to lower your chance of heart disease. Follow-up care is a key part of your treatment and safety. Be sure to make and go to all appointments, and call your doctor if you are having problems. It's also a good idea to know your test results and keep a list of the medicines you take. How can you care for yourself at home? Diet    Use less salt when you cook and eat. This helps lower your blood pressure. Taste food before salting. Add only a little salt when you think you need it. With time, your taste buds will adjust to less salt.     Eat fewer snack items, fast foods, canned soups, and other high-salt, high-fat, processed foods.     Read food labels and try to avoid saturated and trans fats. They increase your risk of heart disease by raising cholesterol levels.     Limit the amount of solid fat-butter, margarine, and shortening-you eat. Use olive, peanut, or canola oil when you cook. Bake, broil, and steam foods instead of frying them.     Eat a variety of fruit and vegetables every day. Dark green, deep orange, red, or yellow fruits and vegetables are especially good for you. Examples include spinach, carrots, peaches, and berries.     Foods high in fiber can reduce your cholesterol and provide important vitamins and minerals. High-fiber foods include whole-grain cereals and breads, oatmeal, beans, brown rice, citrus fruits, and apples.     Eat lean proteins. Heart-healthy proteins include seafood, lean meats and poultry, eggs, beans, peas, nuts, seeds, and soy products.     Limit drinks and foods with added sugar. These include candy, desserts, and soda pop. Lifestyle changes    If your doctor recommends it, get more exercise. Walking is a good choice. Bit by bit, increase the amount you walk every day. Try for at least 30 minutes on most days of the week.  You also may want to swim, bike, or do other activities.     Do not smoke. If you need help quitting, talk to your doctor about stop-smoking programs and medicines. These can increase your chances of quitting for good. Quitting smoking may be the most important step you can take to protect your heart. It is never too late to quit.     Limit alcohol to 2 drinks a day for men and 1 drink a day for women. Too much alcohol can cause health problems.     Manage other health problems such as diabetes, high blood pressure, and high cholesterol. If you think you may have a problem with alcohol or drug use, talk to your doctor. Medicines    Take your medicines exactly as prescribed. Call your doctor if you think you are having a problem with your medicine.     If your doctor recommends aspirin, take the amount directed each day. Make sure you take aspirin and not another kind of pain reliever, such as acetaminophen (Tylenol). When should you call for help? Call 911 if you have symptoms of a heart attack. These may include:    Chest pain or pressure, or a strange feeling in the chest.     Sweating.     Shortness of breath.     Pain, pressure, or a strange feeling in the back, neck, jaw, or upper belly or in one or both shoulders or arms.     Lightheadedness or sudden weakness.     A fast or irregular heartbeat. After you call 911, the  may tell you to chew 1 adult-strength or 2 to 4 low-dose aspirin. Wait for an ambulance. Do not try to drive yourself. Watch closely for changes in your health, and be sure to contact your doctor if you have any problems. Where can you learn more? Go to http://www.reid.com/ and enter F075 to learn more about \"A Healthy Heart: Care Instructions. \"  Current as of: September 7, 2022               Content Version: 13.5  © 9877-2432 Healthwise, [employer]. Care instructions adapted under license by [facility] ([facility]).  If you have questions about a medical condition or this instruction, always ask your healthcare professional. [employer], [facility] disclaims any warranty or liability for your use of this information. Personalized Preventive Plan for [place] - 3/3/2023  Medicare offers a range of preventive health benefits. Some of the tests and screenings are paid in full while other may be subject to a deductible, co-insurance, and/or copay. Some of these benefits include a comprehensive review of your medical history including lifestyle, illnesses that may run in your family, and various assessments and screenings as appropriate. After reviewing your medical record and screening and assessments performed today your provider may have ordered immunizations, labs, imaging, and/or referrals for you. A list of these orders (if applicable) as well as your Preventive Care list are included within your After Visit Summary for your review. Other Preventive Recommendations:    A preventive eye exam performed by an eye specialist is recommended every 1-2 years to screen for glaucoma; cataracts, macular degeneration, and other eye disorders. A preventive dental visit is recommended every 6 months. Try to get at least 150 minutes of exercise per week or 10,000 steps per day on a pedometer . Order or download the FREE \"Exercise & Physical Activity: Your Everyday Guide\" from [employer]. Call 3-554.744.6544 or search [employer] online. You need 7122-7818 mg of calcium and 2877-9826 IU of vitamin D per day. It is possible to meet your calcium requirement with diet alone, but a vitamin D supplement is usually necessary to meet this goal.  When exposed to the sun, use a sunscreen that protects against both UVA and UVB radiation with an SPF of 30 or greater. Reapply every 2 to 3 hours or after sweating, drying off with a towel, or swimming. Always wear a seat belt when traveling in a car. Always wear a helmet when riding a bicycle or motorcycle.        Substance Use Disorder: Care Instructions  Overview     You can improve your life and health by stopping your use of alcohol or drugs. When you don't drink or use drugs, you may feel and sleep better. You may get along better with your family, friends, and coworkers. There are medicines and programs that can help with substance use disorder. How can you care for yourself at home? If you have been given medicine to help keep you sober or reduce your cravings, be sure to take it exactly as prescribed. Talk to your doctor about programs that can help you stop using drugs or drinking alcohol. Do not tempt yourself by keeping alcohol or drugs in your home. Learn how to say no when other people drink or use drugs. Or don't spend time with people who drink or use drugs. Use the time and money spent on drinking or drugs to do something fun with your family or friends. Preventing a relapse  Do not drink alcohol or use drugs at all. Using any amount of alcohol or drugs greatly increases your risk for relapse. Seek help from organizations such as Alcoholics Anonymous, Narcotics Anonymous, or treatment facilities if you feel the need to drink alcohol or use drugs again. Remember that recovery is a lifelong process. Stay away from situations, friends, or places that may lead you to drink or use drugs. Have a plan to spot and deal with relapse. Learn to recognize changes in your thinking that lead you to drink or use drugs. These are warning signs. Get help before you start to drink or use drugs again. Get help as soon as you can if you relapse. Some people make a plan with another person that outlines what they want that person to do for them if they relapse. The plan usually includes how to handle the relapse and who to notify in case of relapse. Don't give up. Remember that a relapse does not mean that you have failed. Use the experience to learn the triggers that lead you to drink or use drugs. Then quit again.  Many people have several relapses before they are able to quit for good. Follow-up care is a key part of your treatment and safety. Be sure to make and go to all appointments, and call your doctor if you are having problems. It's also a good idea to know your test results and keep a list of the medicines you take. When should you call for help? Call 911  anytime you think you may need emergency care. For example, call if:    You feel you cannot stop from hurting yourself or someone else. Call your doctor now or seek immediate medical care if:    You have serious withdrawal symptoms, such as confusion, hallucinations, severe trembling, or seizures. Watch closely for changes in your health, and be sure to contact your doctor if:    You have a relapse.     You need more help or support to stop. Where can you learn more? Go to http://www.reid.com/ and enter H573 to learn more about \"Substance Use Disorder: Care Instructions. \"  Current as of: August 2, 2022               Content Version: 13.5  © 2006-2022 Healthwise, Incorporated. Care instructions adapted under license by [facility] ([facility]). If you have questions about a medical condition or this instruction, always ask your healthcare professional. [place] [address] any warranty or liability for your use of this information. Advance Directives: Care Instructions  Overview  An advance directive is a legal way to state your wishes at the end of your life. It tells your family and your doctor what to do if you can't say what you want. There are two main types of advance directives. You can change them any time your wishes change. Living will. This form tells your family and your doctor your wishes about life support and other treatment. The form is also called a declaration. Medical power of . This form lets you name a person to make treatment decisions for you when you can't speak for yourself.  This person is called a health care agent (health care proxy, health care surrogate). The form is also called a durable power of  for health care. If you do not have an advance directive, decisions about your medical care may be made by a family member, or by a doctor or a  who doesn't know you. It may help to think of an advance directive as a gift to the people who care for you. If you have one, they won't have to make tough decisions by themselves. For more information, including forms for your state, see the [address] website (www.caringinfo.org/planning/advance-directives/). Follow-up care is a key part of your treatment and safety. Be sure to make and go to all appointments, and call your doctor if you are having problems. It's also a good idea to know your test results and keep a list of the medicines you take. What should you include in an advance directive? Many states have a unique advance directive form. (It may ask you to address specific issues.) Or you might use a universal form that's approved by many states. If your form doesn't tell you what to address, it may be hard to know what to include in your advance directive. Use the questions below to help you get started. Who do you want to make decisions about your medical care if you are not able to? What life-support measures do you want if you have a serious illness that gets worse over time or can't be cured? What are you most afraid of that might happen? (Maybe you're afraid of having pain, losing your independence, or being kept alive by machines.)  Where would you prefer to die? (Your home? A hospital? A nursing home?)  Do you want to donate your organs when you die? Do you want certain Denominational practices performed before you die? When should you call for help? Be sure to contact your doctor if you have any questions. Where can you learn more?   Go to http://www.[employer]com/ and enter R264 to learn more about \"Advance Directives: Care Instructions. \"  Current as of: June 16, 2022               Content Version: 13.5  © 7385-1203 Healthwise, [employer]. Care instructions adapted under license by [facility] ([facility]). If you have questions about a medical condition or this instruction, always ask your healthcare professional. [address] any warranty or liability for your use of this information. Starting a Weight Loss Plan: Care Instructions  Overview     If you're thinking about losing weight, it can be hard to know where to start. Your doctor can help you set up a weight loss plan that best meets your needs. You may want to take a class on nutrition or exercise, or you could join a weight loss support group. If you have questions about how to make changes to your eating or exercise habits, ask your doctor about seeing a registered dietitian or an exercise specialist.  It can be a big challenge to lose weight. But you don't have to make huge changes at once. Make small changes, and stick with them. When those changes become habit, add a few more changes. If you don't think you're ready to make changes right now, try to pick a date in the future. Make an appointment to see your doctor to discuss whether the time is right for you to start a plan. Follow-up care is a key part of your treatment and safety. Be sure to make and go to all appointments, and call your doctor if you are having problems. It's also a good idea to know your test results and keep a list of the medicines you take. How can you care for yourself at home? Set realistic goals. Many people expect to lose much more weight than is likely. A weight loss of 5% to 10% of your body weight may be enough to improve your health. Get family and friends involved to provide support. Talk to them about why you are trying to lose weight, and ask them to help.  They can help by participating in exercise and having meals with you, even if they may be eating something different. Find what works best for you. If you do not have time or do not like to cook, a program that offers meal replacement bars or shakes may be better for you. Or if you like to prepare meals, finding a plan that includes daily menus and recipes may be best.  Ask your doctor about other health professionals who can help you achieve your weight loss goals. A dietitian can help you make healthy changes in your diet. An exercise specialist or  can help you develop a safe and effective exercise program.  A counselor or psychiatrist can help you cope with issues such as depression, anxiety, or family problems that can make it hard to focus on weight loss. Consider joining a support group for people who are trying to lose weight. Your doctor can suggest groups in your area. Where can you learn more? Go to http://www.woods.com/ and enter U357 to learn more about \"Starting a Weight Loss Plan: Care Instructions. \"  Current as of: August 25, 2022               Content Version: 13.5  © 2006-2022 [employer]. Care instructions adapted under license by [facility] ([facility]). If you have questions about a medical condition or this instruction, always ask your healthcare professional. [place] [address] any warranty or liability for your use of this information. A Healthy Heart: Care Instructions  Your Care Instructions     Coronary artery disease, also called heart disease, occurs when a substance called plaque builds up in the vessels that supply oxygen-rich blood to your heart muscle. This can narrow the blood vessels and reduce blood flow. A heart attack happens when blood flow is completely blocked. A high-fat diet, smoking, and other factors increase the risk of heart disease. Your doctor has found that you have a chance of having heart disease. You can do lots of things to keep your heart healthy.  It may not be easy, but you can change your diet, exercise more, and quit smoking. These steps really work to lower your chance of heart disease. Follow-up care is a key part of your treatment and safety. Be sure to make and go to all appointments, and call your doctor if you are having problems. It's also a good idea to know your test results and keep a list of the medicines you take. How can you care for yourself at home? Diet    Use less salt when you cook and eat. This helps lower your blood pressure. Taste food before salting. Add only a little salt when you think you need it. With time, your taste buds will adjust to less salt.     Eat fewer snack items, fast foods, canned soups, and other high-salt, high-fat, processed foods.     Read food labels and try to avoid saturated and trans fats. They increase your risk of heart disease by raising cholesterol levels.     Limit the amount of solid fat-butter, margarine, and shortening-you eat. Use olive, peanut, or canola oil when you cook. Bake, broil, and steam foods instead of frying them.     Eat a variety of fruit and vegetables every day. Dark green, deep orange, red, or yellow fruits and vegetables are especially good for you. Examples include spinach, carrots, peaches, and berries.     Foods high in fiber can reduce your cholesterol and provide important vitamins and minerals. High-fiber foods include whole-grain cereals and breads, oatmeal, beans, brown rice, citrus fruits, and apples.     Eat lean proteins. Heart-healthy proteins include seafood, lean meats and poultry, eggs, beans, peas, nuts, seeds, and soy products.     Limit drinks and foods with added sugar. These include candy, desserts, and soda pop. Lifestyle changes    If your doctor recommends it, get more exercise. Walking is a good choice. Bit by bit, increase the amount you walk every day. Try for at least 30 minutes on most days of the week. You also may want to swim, bike, or do other activities.     Do not smoke.  If you need help quitting, talk to your doctor about stop-smoking programs and medicines. These can increase your chances of quitting for good. Quitting smoking may be the most important step you can take to protect your heart. It is never too late to quit.     Limit alcohol to 2 drinks a day for men and 1 drink a day for women. Too much alcohol can cause health problems.     Manage other health problems such as diabetes, high blood pressure, and high cholesterol. If you think you may have a problem with alcohol or drug use, talk to your doctor. Medicines    Take your medicines exactly as prescribed. Call your doctor if you think you are having a problem with your medicine.     If your doctor recommends aspirin, take the amount directed each day. Make sure you take aspirin and not another kind of pain reliever, such as acetaminophen (Tylenol). When should you call for help? Call 911 if you have symptoms of a heart attack. These may include:    Chest pain or pressure, or a strange feeling in the chest.     Sweating.     Shortness of breath.     Pain, pressure, or a strange feeling in the back, neck, jaw, or upper belly or in one or both shoulders or arms.     Lightheadedness or sudden weakness.     A fast or irregular heartbeat. After you call 911, the  may tell you to chew 1 adult-strength or 2 to 4 low-dose aspirin. Wait for an ambulance. Do not try to drive yourself. Watch closely for changes in your health, and be sure to contact your doctor if you have any problems. Where can you learn more? Go to http://www.reid.com/ and enter F075 to learn more about \"A Healthy Heart: Care Instructions. \"  Current as of: September 7, 2022               Content Version: 13.5  © 8524-0410 [employer]. Care instructions adapted under license by [facility] ([facility]).  If you have questions about a medical condition or this instruction, always ask your healthcare professional. Kaden Russell disclaims any warranty or liability for your use of this information. Personalized Preventive Plan for Rashaad Andrade - 3/3/2023  Medicare offers a range of preventive health benefits. Some of the tests and screenings are paid in full while other may be subject to a deductible, co-insurance, and/or copay. Some of these benefits include a comprehensive review of your medical history including lifestyle, illnesses that may run in your family, and various assessments and screenings as appropriate. After reviewing your medical record and screening and assessments performed today your provider may have ordered immunizations, labs, imaging, and/or referrals for you. A list of these orders (if applicable) as well as your Preventive Care list are included within your After Visit Summary for your review. Other Preventive Recommendations:    A preventive eye exam performed by an eye specialist is recommended every 1-2 years to screen for glaucoma; cataracts, macular degeneration, and other eye disorders. A preventive dental visit is recommended every 6 months. Try to get at least 150 minutes of exercise per week or 10,000 steps per day on a pedometer . Order or download the FREE \"Exercise & Physical Activity: Your Everyday Guide\" from [employer]. Call 3-847.168.9411 or search [employer] online. You need 8358-6685 mg of calcium and 1840-1661 IU of vitamin D per day. It is possible to meet your calcium requirement with diet alone, but a vitamin D supplement is usually necessary to meet this goal.  When exposed to the sun, use a sunscreen that protects against both UVA and UVB radiation with an SPF of 30 or greater. Reapply every 2 to 3 hours or after sweating, drying off with a towel, or swimming. Always wear a seat belt when traveling in a car. Always wear a helmet when riding a bicycle or motorcycle.

## 2023-03-03 NOTE — PROGRESS NOTES
Medicare Annual Wellness Visit    [place] Y[place]ana is here for Medicare AWV    Assessment & Plan   Medicare annual wellness visit, subsequent  Essential hypertension  Mixed hyperlipidemia  Class 2 obesity due to excess calories without serious comorbidity with body mass index (BMI) of 37.0 to 37.9 in adult  -     Semaglutide,0.25 or 0.5MG/DOS, (OZEMPIC, 0.25 OR 0.5 MG/DOSE,) 2 MG/1.5ML SOPN; Take 0.25 mg SQ weekly for first four weeks, then increase to 0.5mg SQ weekly for next 4 weeks, then increase to 1mg SQ weekly for four (4) weeks, then increase to 1.5mg SQ weekly for four (4) weeks. Starting on week 17 increase to 2.4mg SQ weekly thereafter., Disp-1.5 mL, R-5Normal  Need for prophylactic vaccination and inoculation against varicella  -     zoster recombinant adjuvanted vaccine [facility]) 50 MCG/0.5ML SUSR injection; Inject 0.5 mLs into the muscle once for 1 dose, Disp-0.5 mL, R-0Print    Recommendations for Preventive Services Due: see orders and patient instructions/AVS.  Recommended screening schedule for the next 5-10 years is provided to the patient in written form: see Patient Instructions/AVS.     Return in 4 months (on 7/3/2023) for Medicare Annual Wellness Visit in 1 year. Subjective   The following acute and/or chronic problems were also addressed today:  Patient wants to be started on the Ozempic SQ weekly injection for weight loss. He checked with his insurance and they told him that it is covered, but might need a prior authorization. Patient's complete Health Risk Assessment and screening values have been reviewed and are found in Flowsheets. The following problems were reviewed today and where indicated follow up appointments were made and/or referrals ordered.     Positive Risk Factor Screenings with Interventions:          Drug Use:          Interpretation:  1-2: Low level - Monitor, re-assess at a later date  3-5: Moderate level - Further Investigation  6-8: Substantial level - Intensive Assessment  9-10: Severe level - Intensive Assessment    Interventions:  Patient declined any further intervention or treatment          Weight and Activity:  Physical Activity: Inactive    Days of Exercise per Week: 0 days    Minutes of Exercise per Session: 0 min     On average, how many days per week do you engage in moderate to strenuous exercise (like a brisk walk)?: 0 days  Have you lost any weight without trying in the past 3 months?: No  Body mass index: (!) 37.23    Inactivity Interventions:  Patient declined any further interventions or treatment  Obesity Interventions: Will try and prescribe Ozempic for weight loss    Obesity Counseling: Patient was asked about his current diet and exercise habits, and personalized advice was provided regarding recommended lifestyle changes. Patient's comorbid health conditions associated with elevated BMI were discussed, as well as the likely benefits of weight loss. Based upon patient's motivation to change his behavior, the following plan was agreed upon to work toward a weight loss goal of 60 pounds:  will start patient on Ozempic for weight loss . Educational materials for weight loss were provided. Patient will follow-up in 6 month(s) with PCP. Provider spent 10 minutes counseling patient. Dentist Screen:  Have you seen the dentist within the past year?: (!) No    Intervention:  Advised to schedule with their dentist        Advanced Directives:  Do you have a Living Will?: (!) No    Intervention:  has NO advanced directive - information provided    Advance Care Planning   Advanced Care Planning: Discussed the patients choices for care and treatment in case of a health event that adversely affects decision-making abilities. Also discussed the patients long-term treatment options. Reviewed with the patient the appropriate state-specific advance directive documents.  Reviewed the process of designating a competent adult as an Agent (or -in-fact) that could take make health care decisions for the patient if incompetent. Patient was asked to complete the declaration forms, if they have not already, either acknowledge the forms by a public notary or an eligible witness and provide a signed copy to the practice office. Time spent (minutes): 10        CV Risk Counseling:  Patient was asked about his current diet and exercise habits, and personalized advice was provided regarding recommended lifestyle changes. Patient's individual cardiovascular disease risk factors, including advanced age (> 54 for men, > 72 for women), dyslipidemia, hypertension, male gender, obesity (BMI >= 30), and sedentary lifestyle, were discussed, as well as the likely benefits of lifestyle changes. Based upon patient's motivation to change his behavior, the following plan was agreed upon to work toward lowering cardiovascular disease risk: low saturated fat, low cholesterol diet, DASH diet, at least 150 minutes of exercise/week, and increase physical activity, as tolerated. Aspirin use for primary prevention of cardiovascular disease for men 45-79 and women 55-79:  patient currently taking Lipitor for over 5 years . Educational materials for lifestyle changes were provided. Patient will follow-up in 12 month(s) with PCP. Provider spent 15 minutes counseling patient. Objective   Vitals:    03/03/23 1516 03/03/23 1523   BP: (!) 150/88 (!) 143/87   Pulse: 76    Resp: 22    Temp: 97.4 °F (36.3 °C)    TempSrc: Temporal    SpO2: 92%    Weight: 267 lb (121.1 kg)    Height: 5' 11\" (1.803 m)       Body mass index is 37.24 kg/m².       General Appearance: alert and oriented to person, place and time, well developed and well- nourished, in no acute distress  Skin: warm and dry, no rash or erythema  Head: normocephalic and atraumatic  Eyes: pupils equal, round, and reactive to light, extraocular eye movements intact, conjunctivae normal  ENT: tympanic membrane, external ear and ear canal normal bilaterally, nose without deformity, nasal mucosa and turbinates normal without polyps  Neck: supple and non-tender without mass, no thyromegaly or thyroid nodules, no cervical lymphadenopathy  Pulmonary/Chest: clear to auscultation bilaterally- no wheezes, rales or rhonchi, normal air movement, no respiratory distress  Cardiovascular: normal rate, regular rhythm, normal S1 and S2, no murmurs, rubs, clicks, or gallops, distal pulses intact, no carotid bruits  Abdomen: soft, non-tender, non-distended, normal bowel sounds, no masses or organomegaly  Extremities: no cyanosis, clubbing or edema  Musculoskeletal: normal range of motion, no joint swelling, deformity or tenderness  Neurologic: reflexes normal and symmetric, no cranial nerve deficit, gait, coordination and speech normal       Allergies   Allergen Reactions    Bee Venom     Dye [Iodides] Other (See Comments)     Patient reports father had an anaphylactic reaction to iodide which result in death    Iodine Solution [Povidone Iodine] Rash     TOPICAL AND INJECTED    Sulfa Antibiotics Rash    Topical Sulfur Rash     Prior to Visit Medications    Medication Sig Taking? Authorizing Provider   Semaglutide,0.25 or 0.5MG/DOS, (OZEMPIC, 0.25 OR 0.5 MG/DOSE,) 2 MG/1.5ML SOPN Take 0.25 mg SQ weekly for first four weeks, then increase to 0.5mg SQ weekly for next 4 weeks, then increase to 1mg SQ weekly for four (4) weeks, then increase to 1.5mg SQ weekly for four (4) weeks. Starting on week 17 increase to 2.4mg SQ weekly thereafter.  Yes Aaron Hughes, DO   zoster recombinant adjuvanted vaccine (SHINGRIX) 50 MCG/0.5ML SUSR injection Inject 0.5 mLs into the muscle once for 1 dose Yes Jesus Alberto Hughes, DO   hydrALAZINE (APRESOLINE) 10 MG tablet Take 1 tablet by mouth 3 times daily Yes Jesus Alberto Hughes, DO   metoprolol succinate (TOPROL XL) 50 MG extended release tablet Take 1 tablet by mouth 2 times daily Yes Jesus Alberto Hughes, DO   Cholecalciferol (VITAMIN D3) 50 MCG (2000 UT) CAPS Take one tab po daily Yes Jesus Alberto Hughes DO   ibuprofen (ADVIL;MOTRIN) 800 MG tablet Take 1 tablet by mouth every 8 hours as needed for Pain Yes Jesus Alberto Hughes DO   famotidine (PEPCID) 40 MG tablet Take 1 tablet by mouth every evening Yes Jesus Alberto Hughes DO   losartan (COZAAR) 100 MG tablet TAKE ONE TABLET BY MOUTH EVERY DAY Yes Jesus Alberto Hughes DO   loratadine (CLARITIN) 10 MG tablet Take 1 tablet by mouth daily Yes Jesus Alberto Hughes DO   atorvastatin (LIPITOR) 10 MG tablet TAKE ONE TABLET BY MOUTH EVERY DAY Yes Jesus Alberto Hughes DO   aspirin EC 81 MG EC tablet Take 1 tablet by mouth daily Yes Jesus Alberto Hughes DO   docusate sodium (DOK) 100 MG capsule Take 1 capsule by mouth 2 times daily as needed for Constipation Yes Jesus Alberto Hughes DO   fluticasone (FLONASE) 50 MCG/ACT nasal spray PLACE 1 OR 2 SPRAYS INTO EACH NOSTRIL ONCE DAILY AS DIRECTED Yes Jesus Alberto Hughes DO   albuterol sulfate HFA (VENTOLIN HFA) 108 (90 Base) MCG/ACT inhaler Inhale 2 puffs into the lungs 4 times daily as needed for Wheezing Yes Jesus Alberto Hughes DO   EPINEPHrine (EPIPEN 2-EVANGELISTA) 0.3 MG/0.3ML SOAJ injection Inject 0.3 mLs into the muscle as needed (bee) Use as directed for allergic reaction Yes Rosa Maria Laird,    ciclopirox (PENLAC) 8 % solution Apply topically nightly. Yes Rosa Maria Laird, DO   clobetasol (TEMOVATE) 0.05 % ointment Apply topically 2 times daily. Yes Brenna Chilel, DO   leuprolide acetate, 4 Month, (ELIGARD) 30 MG injection Inject into the skin every 6 months Yes Historical Provider, MD   XTANDI 40 MG capsule Indications: taking 4 pills at once daily  Yes Historical Provider, MD   ketorolac (ACULAR) 0.5 % ophthalmic solution Place 1 drop into both eyes 4 times daily Yes Rosa Maria Laird, DO   triamcinolone (KENALOG) 0.1 % cream Apply topically 2 times daily.  Yes Rosa Maria Laird, DO   desonide (DESOWEN) 0.05 % cream Apply to affected area on face and neck twice daily, 5 days on 2 days off, as needed. Yes Historical Provider, MD   fluocinonide (LIDEX) 0.05 % cream Apply to affected area twice daily, 5 days on 2 days off, as needed. Not for face, armpits, or groin. Yes Historical Provider, MD   Psyllium (METAMUCIL FIBER PO) Take by mouth Yes Historical Provider, MD   amphetamine-dextroamphetamine (ADDERALL) 30 MG tablet Take 30 mg by mouth as needed. Indications: take 1 tablet in the afternoon  Yes Historical Provider, MD   lisdexamfetamine (VYVANSE) 70 MG capsule Take 1 capsule by mouth daily. Yes Historical Provider, MD   mineral oil-hydrophilic petrolatum (AQUAPHOR) ointment Apply topically as needed.  Yes Leonela Blakely MD   calcium carbonate (CALCIUM 600) 600 MG TABS tablet Take 1 tablet by mouth daily  Patient not taking: No sig reported  Rigo Wade DO   tamsulosin (FLOMAX) 0.4 MG capsule   Historical Provider, MD De Souza (Including outside providers/suppliers regularly involved in providing care):   Patient Care Team:  Isom Rubinstein, DO as PCP - General (Family Medicine)  Isom Rubinstein, DO as PCP - Empaneled Provider  Lucien Fofana MD as Surgeon (Neurosurgery)  Samantha Ramirez MD as Consulting Physician (Neurosurgery)  Karlos Almaguer MD as Consulting Physician (Cardiology)     Reviewed and updated this visit:  Tobacco  Allergies  Meds  Problems  Med Hx  Surg Hx  Soc Hx  Fam Hx               Isom Rubinstein, DO

## 2023-03-07 NOTE — TELEPHONE ENCOUNTER
Routing to provider for scheduling guidance. Plan: \\nThis patient has been treated today with image-guided superficial radiation therapy for non-melanoma skin cancer. Written informed consent has been previously obtained from this patient for this treatment. This consent is documented in the patient's chart. The patient gave verbal consent to continue treatment today. The patient was treated with a specific radiation dose and setup as prescribed by the provider listed on this visit note. A Radiation Therapist performed administration of radiation under the supervision of a provider. The treatment parameters and cumulative dose are indicated above. Prior to administering the radiation, the patient underwent a verification therapeutic radiology simulation-aided field setting defining relevant normal and abnormal target anatomy and acquiring images with a separate and distinct diagnostic high-frequency ultrasound to delineate tissues and determine whether to proceed with delivery of therapy, in addition to retrieve data necessary to develop an optimal radiation treatment process for the patient. The field placement simulation documents any change seen in overall tumor volume documented in the patient’s record, allows the clinician to indicate any needed changes in the treatment plan and/or prescription, provides diagnostic evaluation as the basis for performing the therapeutic procedure, and clearly identifies the information needed to decide to proceed with the therapeutic procedure. This process includes verification of the treatment port(s) and proper treatment positioning. All treatment ports were photographed within electronic medical records. The patient's lead blocking along with gross tumor volume and margin was confirmed. Considering superficial radiotherapy is clinical in setup, this requires the physician and radiation therapist to clarify the location interest being treated against initial images, ultrasound, pathology, and patient anatomy. Care was taken to ensure adams treated were geometrically accurate and properly positioned using therapeutic radiology simulation-aided field setting verification per fraction. This process is also utilized to determine if any prescription or setup changes are necessary. These steps are therefore medically necessary to ensure safe and effective administration of radiation. Ongoing therapeutic radiology simulation-aided field setting verification is ordered throughout the course of therapy.\\n\\nA high-frequency ultrasound image was acquired today for a two-dimensional evaluation of the tumor volume, depth, width, breadth, review of prior response to treatment, provide geometric accuracy of field placement, and determine whether to proceed with therapeutic delivery.\\n\\nHigh frequency ultrasound depth is  1.53mm, which is  0.20mm in difference from previous imaging.\\n\\nThe field placement and ultrasound imaging, per fraction, is separate and distinct from the initial simulation and is an important task in providing safe administration of superficial radiation therapy. Physician evaluation of the ultrasound information will be ongoing throughout the course of treatment and is deemed medically necessary to ensure the efficacy of treatment, whether to proceed with therapeutic delivery, and determine any necessary changes. Today's images were evaluated for determination of continuation of treatment with the current plan or with necessary changes as appropriate. According to the review of verification therapeutic radiology simulation-aided field setting and imaging, no change is required. Additionally, the use of ultrasound visualization and targeted assessment allows the patient to be able to see his or her cancer(s) progress, encouraging the patient to complete and maintain compliance through the full course of prescribed radiotherapy. Per Dr. Faith, continued ultrasound guidance and therapeutic radiology simulation-aided field setting verification per fraction is required for field placement, measurement of tumor depth, tissues evaluation, progress, acute effect monitoring, and determination for therapeutic treatment delivery is appropriate.\\n\\n Detail Level: Zone

## 2023-04-02 PROBLEM — Z00.00 MEDICARE ANNUAL WELLNESS VISIT, SUBSEQUENT: Status: RESOLVED | Noted: 2023-03-03 | Resolved: 2023-04-02

## 2023-04-03 ENCOUNTER — TELEPHONE (OUTPATIENT)
Dept: FAMILY MEDICINE CLINIC | Age: 63
End: 2023-04-03

## 2023-04-07 NOTE — TELEPHONE ENCOUNTER
Pharmacist calling and states that ozempic needs a prior auth.
Please advise PA or change in medication
(Steglatro)  Exenatide ER (Bydureon)  Exenatide IR (Byetta)  Glimepiride (Amaryl)  Glipizide (Glucotrol)  Glucophage (Metformin)  Glyburide (Diabeta, Glynase, Micronase)  Insulin degludec Lainasesar Brionesard)  Insulin detemir (Levemir)  Insulin glargine U100 (Basaglar, Lantus)  Insulin glargine-yfgn (Semglee)  Insulin glargine U300 (Toujeo)  Linagliptin (Tradjenta)  Liraglutide (Saxenda, Victoza)  Pioglitazone (Actos)  Semaglutide (Rybelsus)   Saxagliptin (Onglyza)  Sitagliptin (Januvia)  Other (Please specify other)    Please also make note of the following in a free text field (if available): What is the patients diagnosis? Type 2 diabetes mellitus  Other  Please specify other  Has the patient tried and failed or been unable to tolerate metformin (alone or in combination)? (Y/N)  What is the patients most recent hemoglobin A1c? Is this a request for an initiation or continuation of therapy?   Initiation  Continuation  Does the prescriber believe there has been a positive clinical response while on OZEMPIC (e.g. documented reduction in hemoglobin A1c)? (Y/N)

## 2023-05-31 ENCOUNTER — HOSPITAL ENCOUNTER (OUTPATIENT)
Age: 63
Discharge: HOME | End: 2023-05-31
Payer: COMMERCIAL

## 2023-05-31 DIAGNOSIS — E78.2 MIXED HYPERLIPIDEMIA: ICD-10-CM

## 2023-05-31 DIAGNOSIS — Z13.29 SCREENING FOR THYROID DISORDER: ICD-10-CM

## 2023-05-31 DIAGNOSIS — E55.9 VITAMIN D DEFICIENCY: ICD-10-CM

## 2023-05-31 DIAGNOSIS — R73.9 HYPERGLYCEMIA: ICD-10-CM

## 2023-05-31 LAB
ALBUMIN SERPL-MCNC: 3.9 G/DL (ref 3.5–5.2)
ALP SERPL-CCNC: 106 U/L (ref 40–129)
ALT SERPL-CCNC: 19 U/L (ref 0–40)
ANION GAP SERPL CALCULATED.3IONS-SCNC: 14 MMOL/L (ref 7–16)
AST SERPL-CCNC: 17 U/L (ref 0–39)
BASOPHILS # BLD: 0.06 E9/L (ref 0–0.2)
BASOPHILS NFR BLD: 0.6 % (ref 0–2)
BILIRUB SERPL-MCNC: 0.3 MG/DL (ref 0–1.2)
BUN SERPL-MCNC: 11 MG/DL (ref 6–23)
CALCIUM SERPL-MCNC: 9.3 MG/DL (ref 8.6–10.2)
CHLORIDE SERPL-SCNC: 104 MMOL/L (ref 98–107)
CHOLESTEROL, TOTAL: 138 MG/DL (ref 0–199)
CO2 SERPL-SCNC: 25 MMOL/L (ref 22–29)
CREAT SERPL-MCNC: 0.8 MG/DL (ref 0.7–1.2)
EOSINOPHIL # BLD: 0.37 E9/L (ref 0.05–0.5)
EOSINOPHIL NFR BLD: 3.9 % (ref 0–6)
ERYTHROCYTE [DISTWIDTH] IN BLOOD BY AUTOMATED COUNT: 12.2 FL (ref 11.5–15)
GLUCOSE SERPL-MCNC: 107 MG/DL (ref 74–99)
HBA1C MFR BLD: 5.5 % (ref 4–5.6)
HCT VFR BLD AUTO: 41.6 % (ref 37–54)
HDLC SERPL-MCNC: 45 MG/DL
HGB BLD-MCNC: 14.6 G/DL (ref 12.5–16.5)
IMM GRANULOCYTES # BLD: 0.05 E9/L
IMM GRANULOCYTES NFR BLD: 0.5 % (ref 0–5)
LDLC SERPL CALC-MCNC: 59 MG/DL (ref 0–99)
LYMPHOCYTES # BLD: 1.36 E9/L (ref 1.5–4)
LYMPHOCYTES NFR BLD: 14.2 % (ref 20–42)
MCH RBC QN AUTO: 31.7 PG (ref 26–35)
MCHC RBC AUTO-ENTMCNC: 35.1 % (ref 32–34.5)
MCV RBC AUTO: 90.2 FL (ref 80–99.9)
MONOCYTES # BLD: 0.69 E9/L (ref 0.1–0.95)
MONOCYTES NFR BLD: 7.2 % (ref 2–12)
NEUTROPHILS # BLD: 7.06 E9/L (ref 1.8–7.3)
NEUTS SEG NFR BLD: 73.6 % (ref 43–80)
PLATELET # BLD AUTO: 253 E9/L (ref 130–450)
PMV BLD AUTO: 8.5 FL (ref 7–12)
POTASSIUM SERPL-SCNC: 4 MMOL/L (ref 3.5–5)
PROT SERPL-MCNC: 7.1 G/DL (ref 6.4–8.3)
RBC # BLD AUTO: 4.61 E12/L (ref 3.8–5.8)
SODIUM SERPL-SCNC: 143 MMOL/L (ref 132–146)
TRIGL SERPL-MCNC: 169 MG/DL (ref 0–149)
TSH SERPL-MCNC: 2.89 UIU/ML (ref 0.27–4.2)
VITAMIN D 25-HYDROXY: 44 NG/ML (ref 30–100)
VLDLC SERPL CALC-MCNC: 34 MG/DL
WBC # BLD: 9.6 E9/L (ref 4.5–11.5)

## 2023-05-31 PROCEDURE — 80053 COMPREHEN METABOLIC PANEL: CPT

## 2023-05-31 PROCEDURE — 84443 ASSAY THYROID STIM HORMONE: CPT

## 2023-05-31 PROCEDURE — 36415 COLL VENOUS BLD VENIPUNCTURE: CPT

## 2023-05-31 PROCEDURE — 80061 LIPID PANEL: CPT

## 2023-05-31 PROCEDURE — 82306 VITAMIN D 25 HYDROXY: CPT

## 2023-05-31 PROCEDURE — 83036 HEMOGLOBIN GLYCOSYLATED A1C: CPT

## 2023-05-31 PROCEDURE — 85025 COMPLETE CBC W/AUTO DIFF WBC: CPT

## 2023-06-02 ENCOUNTER — OFFICE VISIT (OUTPATIENT)
Dept: FAMILY MEDICINE CLINIC | Age: 63
End: 2023-06-02
Payer: COMMERCIAL

## 2023-06-02 VITALS
RESPIRATION RATE: 20 BRPM | HEART RATE: 79 BPM | SYSTOLIC BLOOD PRESSURE: 152 MMHG | OXYGEN SATURATION: 98 % | HEIGHT: 71 IN | BODY MASS INDEX: 36.54 KG/M2 | WEIGHT: 261 LBS | TEMPERATURE: 97.3 F | DIASTOLIC BLOOD PRESSURE: 88 MMHG

## 2023-06-02 DIAGNOSIS — L03.211 CELLULITIS OF FACE: Primary | ICD-10-CM

## 2023-06-02 DIAGNOSIS — E66.09 CLASS 2 OBESITY DUE TO EXCESS CALORIES WITHOUT SERIOUS COMORBIDITY WITH BODY MASS INDEX (BMI) OF 36.0 TO 36.9 IN ADULT: ICD-10-CM

## 2023-06-02 PROCEDURE — 1036F TOBACCO NON-USER: CPT | Performed by: NEUROMUSCULOSKELETAL MEDICINE & OMM

## 2023-06-02 PROCEDURE — 3079F DIAST BP 80-89 MM HG: CPT | Performed by: NEUROMUSCULOSKELETAL MEDICINE & OMM

## 2023-06-02 PROCEDURE — 3077F SYST BP >= 140 MM HG: CPT | Performed by: NEUROMUSCULOSKELETAL MEDICINE & OMM

## 2023-06-02 PROCEDURE — G8417 CALC BMI ABV UP PARAM F/U: HCPCS | Performed by: NEUROMUSCULOSKELETAL MEDICINE & OMM

## 2023-06-02 PROCEDURE — 99213 OFFICE O/P EST LOW 20 MIN: CPT | Performed by: NEUROMUSCULOSKELETAL MEDICINE & OMM

## 2023-06-02 PROCEDURE — 3017F COLORECTAL CA SCREEN DOC REV: CPT | Performed by: NEUROMUSCULOSKELETAL MEDICINE & OMM

## 2023-06-02 PROCEDURE — 96372 THER/PROPH/DIAG INJ SC/IM: CPT | Performed by: NEUROMUSCULOSKELETAL MEDICINE & OMM

## 2023-06-02 PROCEDURE — G8427 DOCREV CUR MEDS BY ELIG CLIN: HCPCS | Performed by: NEUROMUSCULOSKELETAL MEDICINE & OMM

## 2023-06-02 RX ORDER — CLINDAMYCIN HYDROCHLORIDE 300 MG/1
300 CAPSULE ORAL 4 TIMES DAILY
Qty: 40 CAPSULE | Refills: 0 | Status: SHIPPED | OUTPATIENT
Start: 2023-06-02 | End: 2023-06-12

## 2023-06-02 RX ORDER — CEFTRIAXONE 1 G/1
1000 INJECTION, POWDER, FOR SOLUTION INTRAMUSCULAR; INTRAVENOUS ONCE
Status: COMPLETED | OUTPATIENT
Start: 2023-06-02 | End: 2023-06-02

## 2023-06-02 RX ORDER — DOXYCYCLINE HYCLATE 100 MG
100 TABLET ORAL 2 TIMES DAILY
Qty: 20 TABLET | Refills: 0 | Status: SHIPPED | OUTPATIENT
Start: 2023-06-02 | End: 2023-06-12

## 2023-06-02 RX ADMIN — CEFTRIAXONE 1000 MG: 1 INJECTION, POWDER, FOR SOLUTION INTRAMUSCULAR; INTRAVENOUS at 14:08

## 2023-06-02 ASSESSMENT — ENCOUNTER SYMPTOMS
TROUBLE SWALLOWING: 0
FACIAL SWELLING: 1
SORE THROAT: 0
RHINORRHEA: 1
SHORTNESS OF BREATH: 0
CHEST TIGHTNESS: 0
COUGH: 0
VOICE CHANGE: 0
BACK PAIN: 0
CHOKING: 0

## 2023-06-02 NOTE — PROGRESS NOTES
Sudhir Pina (:  1960) is a 63 y.o. male,Established patient, here for evaluation of the following chief complaint(s):  Facial Swelling (X 2 days, red and swollen hot to touch/) and Results (Discuss lab results from 23/)         ASSESSMENT/PLAN:  1. Cellulitis of face  Assessment & Plan:  Patient given Rocephin 1 g IM x1 prior to discharge, also p.o. doxycycline 100 mg twice a day x10 days, and p.o. clindamycin 300 mg 1 by mouth every 6 hours for 10 days. We will see patient back in the office in 1 week, and was instructed if signs and symptoms worsen to go to the emergency room soon as possible. Orders:  -     cefTRIAXone (ROCEPHIN) injection 1,000 mg; 1,000 mg, IntraMUSCular, ONCE, 1 dose, On 23 at 1430Antimicrobial Indications: Skin and Soft Tissue InfectionSkin duration of therapy: 7 daysSuspected Organism(s): staphReconstitute with 3.6 mL 1% lidocaine or sterile water. -     clindamycin (CLEOCIN) 300 MG capsule; Take 1 capsule by mouth 4 times daily for 10 days, Disp-40 capsule, R-0Normal  -     doxycycline hyclate (VIBRA-TABS) 100 MG tablet; Take 1 tablet by mouth 2 times daily for 10 days, Disp-20 tablet, R-0Normal  2. Class 2 obesity due to excess calories without serious comorbidity with body mass index (BMI) of 36.0 to 36.9 in adult      Return in about 1 week (around 2023) for to monitor medical conditions. Subjective   SUBJECTIVE/OBJECTIVE:  HPI 29-year-old male here with a 2-day history of diffuse erythema and swelling of his left side of his face including his cheek just below his left eye. Patient denies any double vision, visual problems, fever, sweats, and/or chills. Patient denies any bug bites to the area. Patient started noticing redness on the left side of his nasal area and it spread quickly.   I did give the patient 1 g of Rocephin IM x1 and placed him on double antibiotics consisting of doxycycline 100 mg 1 by mouth twice a day for 10 days, and

## 2023-06-02 NOTE — ASSESSMENT & PLAN NOTE
Patient given Rocephin 1 g IM x1 prior to discharge, also p.o. doxycycline 100 mg twice a day x10 days, and p.o. clindamycin 300 mg 1 by mouth every 6 hours for 10 days. We will see patient back in the office in 1 week, and was instructed if signs and symptoms worsen to go to the emergency room soon as possible.

## 2023-06-09 ENCOUNTER — OFFICE VISIT (OUTPATIENT)
Dept: FAMILY MEDICINE CLINIC | Age: 63
End: 2023-06-09
Payer: COMMERCIAL

## 2023-06-09 VITALS
TEMPERATURE: 97.7 F | HEART RATE: 74 BPM | OXYGEN SATURATION: 96 % | RESPIRATION RATE: 20 BRPM | SYSTOLIC BLOOD PRESSURE: 136 MMHG | BODY MASS INDEX: 36.85 KG/M2 | DIASTOLIC BLOOD PRESSURE: 84 MMHG | HEIGHT: 71 IN | WEIGHT: 263.2 LBS

## 2023-06-09 DIAGNOSIS — L03.211 CELLULITIS OF FACE: Primary | ICD-10-CM

## 2023-06-09 PROCEDURE — 99213 OFFICE O/P EST LOW 20 MIN: CPT | Performed by: NEUROMUSCULOSKELETAL MEDICINE & OMM

## 2023-06-09 PROCEDURE — G8427 DOCREV CUR MEDS BY ELIG CLIN: HCPCS | Performed by: NEUROMUSCULOSKELETAL MEDICINE & OMM

## 2023-06-09 PROCEDURE — 1036F TOBACCO NON-USER: CPT | Performed by: NEUROMUSCULOSKELETAL MEDICINE & OMM

## 2023-06-09 PROCEDURE — 3017F COLORECTAL CA SCREEN DOC REV: CPT | Performed by: NEUROMUSCULOSKELETAL MEDICINE & OMM

## 2023-06-09 PROCEDURE — 3074F SYST BP LT 130 MM HG: CPT | Performed by: NEUROMUSCULOSKELETAL MEDICINE & OMM

## 2023-06-09 PROCEDURE — 3078F DIAST BP <80 MM HG: CPT | Performed by: NEUROMUSCULOSKELETAL MEDICINE & OMM

## 2023-06-09 PROCEDURE — G8417 CALC BMI ABV UP PARAM F/U: HCPCS | Performed by: NEUROMUSCULOSKELETAL MEDICINE & OMM

## 2023-06-09 ASSESSMENT — ENCOUNTER SYMPTOMS
BLOOD IN STOOL: 0
BACK PAIN: 0
VOMITING: 0
APNEA: 0
CHEST TIGHTNESS: 0
WHEEZING: 0
DIARRHEA: 0
COUGH: 0
ABDOMINAL DISTENTION: 0
CONSTIPATION: 0
ABDOMINAL PAIN: 0
CHOKING: 0
NAUSEA: 0
SHORTNESS OF BREATH: 0

## 2023-06-09 NOTE — PROGRESS NOTES
cancer, as well as protecting against potentially harmful/life threatening disease. Patient and/or guardian verbalizes understanding andagrees with above counseling, assessment and plan. All questions answered. An electronic signature was used to authenticate this note.     --Farnk Payne, DO

## 2023-06-23 ENCOUNTER — OFFICE VISIT (OUTPATIENT)
Dept: PRIMARY CARE CLINIC | Age: 63
End: 2023-06-23
Payer: COMMERCIAL

## 2023-06-23 VITALS
WEIGHT: 263.9 LBS | BODY MASS INDEX: 36.94 KG/M2 | DIASTOLIC BLOOD PRESSURE: 78 MMHG | HEIGHT: 71 IN | RESPIRATION RATE: 20 BRPM | OXYGEN SATURATION: 98 % | SYSTOLIC BLOOD PRESSURE: 139 MMHG | TEMPERATURE: 97.2 F | HEART RATE: 81 BPM

## 2023-06-23 DIAGNOSIS — H10.33 ACUTE BACTERIAL CONJUNCTIVITIS OF BOTH EYES: Primary | ICD-10-CM

## 2023-06-23 DIAGNOSIS — L03.211 CELLULITIS, FACE: ICD-10-CM

## 2023-06-23 PROCEDURE — 3078F DIAST BP <80 MM HG: CPT | Performed by: NURSE PRACTITIONER

## 2023-06-23 PROCEDURE — 3017F COLORECTAL CA SCREEN DOC REV: CPT | Performed by: NURSE PRACTITIONER

## 2023-06-23 PROCEDURE — 1036F TOBACCO NON-USER: CPT | Performed by: NURSE PRACTITIONER

## 2023-06-23 PROCEDURE — 99213 OFFICE O/P EST LOW 20 MIN: CPT | Performed by: NURSE PRACTITIONER

## 2023-06-23 PROCEDURE — G8417 CALC BMI ABV UP PARAM F/U: HCPCS | Performed by: NURSE PRACTITIONER

## 2023-06-23 PROCEDURE — 3075F SYST BP GE 130 - 139MM HG: CPT | Performed by: NURSE PRACTITIONER

## 2023-06-23 PROCEDURE — G8427 DOCREV CUR MEDS BY ELIG CLIN: HCPCS | Performed by: NURSE PRACTITIONER

## 2023-06-23 PROCEDURE — 96372 THER/PROPH/DIAG INJ SC/IM: CPT | Performed by: NURSE PRACTITIONER

## 2023-06-23 RX ORDER — OFLOXACIN 3 MG/ML
2 SOLUTION/ DROPS OPHTHALMIC 4 TIMES DAILY
Qty: 5 ML | Refills: 0 | Status: SHIPPED | OUTPATIENT
Start: 2023-06-23 | End: 2023-07-03

## 2023-06-23 RX ORDER — CEPHALEXIN 500 MG/1
500 CAPSULE ORAL 3 TIMES DAILY
Qty: 15 CAPSULE | Refills: 0 | Status: SHIPPED | OUTPATIENT
Start: 2023-06-23 | End: 2023-06-28

## 2023-06-23 RX ORDER — CEFTRIAXONE 500 MG/1
500 INJECTION, POWDER, FOR SOLUTION INTRAMUSCULAR; INTRAVENOUS ONCE
Status: COMPLETED | OUTPATIENT
Start: 2023-06-23 | End: 2023-06-23

## 2023-06-23 RX ORDER — PREDNISONE 10 MG/1
10 TABLET ORAL 2 TIMES DAILY
Qty: 6 TABLET | Refills: 0 | Status: SHIPPED | OUTPATIENT
Start: 2023-06-23 | End: 2023-06-26

## 2023-06-23 RX ADMIN — CEFTRIAXONE 500 MG: 500 INJECTION, POWDER, FOR SOLUTION INTRAMUSCULAR; INTRAVENOUS at 13:41

## 2023-06-23 NOTE — PROGRESS NOTES
Chief Complaint:   Conjunctivitis      History of Present Illness   Source of history provided by:  patient. Victorina Jacinto is a 63 y.o. old male with a past medical history of:   Past Medical History:   Diagnosis Date    Acute drug-induced gout of left foot 8/29/2015    Had gout attack on 7/18/15. Seems to be HCTZ induced. ADHD (attention deficit hyperactivity disorder)     Back pain, chronic     Depression     Hyperlipidemia     Hypertension     Left elbow fracture 10/27/2015    MRSA (methicillin resistant staph aureus) culture positive     Seasonal allergies         Pt presents to the [facility] care bilateral eye redness/edema ( R > L ) that started a few days ago and is progressively worsening. Pt stated he woke up this morning and his right eye had yellow drainage/crusting. Pt stated he has been working outside and does  baseball outside. Pt is currently afebrile. Pt does not wear corrective lenses. Denies any N/V/D, visual deficits, sore throat, dizziness, headaches or lethargy. ROS          Unless otherwise stated in this report or unable to obtain because of the patient's clinical or mental status as evidenced by the medical record, this patients's positive and negative responses for Review of Systems, constitutional, psych, eyes, ENT, cardiovascular, respiratory, gastrointestinal, neurological, genitourinary, musculoskeletal, integument systems and systems related to the presenting problem are either stated in the preceding or were not pertinent or were negative for the symptoms and/or complaints related to the medical problem. Past Surgical History:  has a past surgical history that includes Appendectomy (1972); Tonsillectomy (1972); Foot surgery (1990s); lumbar discectomy (2007); Spine surgery (2009); Spine surgery (10/11/2011); Spine surgery (6/2012); Colonoscopy (2/1/2013);  Colonoscopy (02/01/2013); other surgical history (03/28/2018); pr insj/rplcmt spi npgr dir/induxive

## 2023-07-07 ENCOUNTER — OFFICE VISIT (OUTPATIENT)
Dept: FAMILY MEDICINE CLINIC | Age: 63
End: 2023-07-07
Payer: COMMERCIAL

## 2023-07-07 VITALS
DIASTOLIC BLOOD PRESSURE: 79 MMHG | WEIGHT: 267 LBS | BODY MASS INDEX: 37.38 KG/M2 | SYSTOLIC BLOOD PRESSURE: 121 MMHG | OXYGEN SATURATION: 97 % | TEMPERATURE: 97.8 F | HEIGHT: 71 IN | RESPIRATION RATE: 20 BRPM | HEART RATE: 85 BPM

## 2023-07-07 DIAGNOSIS — I10 ESSENTIAL HYPERTENSION: Primary | ICD-10-CM

## 2023-07-07 DIAGNOSIS — E78.2 MIXED HYPERLIPIDEMIA: Chronic | ICD-10-CM

## 2023-07-07 DIAGNOSIS — K21.9 GASTROESOPHAGEAL REFLUX DISEASE WITHOUT ESOPHAGITIS: ICD-10-CM

## 2023-07-07 PROCEDURE — 99213 OFFICE O/P EST LOW 20 MIN: CPT | Performed by: NEUROMUSCULOSKELETAL MEDICINE & OMM

## 2023-07-07 PROCEDURE — 1036F TOBACCO NON-USER: CPT | Performed by: NEUROMUSCULOSKELETAL MEDICINE & OMM

## 2023-07-07 PROCEDURE — 3078F DIAST BP <80 MM HG: CPT | Performed by: NEUROMUSCULOSKELETAL MEDICINE & OMM

## 2023-07-07 PROCEDURE — G8427 DOCREV CUR MEDS BY ELIG CLIN: HCPCS | Performed by: NEUROMUSCULOSKELETAL MEDICINE & OMM

## 2023-07-07 PROCEDURE — 3017F COLORECTAL CA SCREEN DOC REV: CPT | Performed by: NEUROMUSCULOSKELETAL MEDICINE & OMM

## 2023-07-07 PROCEDURE — G8417 CALC BMI ABV UP PARAM F/U: HCPCS | Performed by: NEUROMUSCULOSKELETAL MEDICINE & OMM

## 2023-07-07 PROCEDURE — 3074F SYST BP LT 130 MM HG: CPT | Performed by: NEUROMUSCULOSKELETAL MEDICINE & OMM

## 2023-07-07 ASSESSMENT — ENCOUNTER SYMPTOMS
NAUSEA: 0
BLOOD IN STOOL: 0
DIARRHEA: 0
CONSTIPATION: 0
WHEEZING: 0
ABDOMINAL PAIN: 0
SHORTNESS OF BREATH: 0
CHEST TIGHTNESS: 0
ABDOMINAL DISTENTION: 0
CHOKING: 0
COUGH: 0

## 2023-07-19 DIAGNOSIS — E78.2 MIXED HYPERLIPIDEMIA: ICD-10-CM

## 2023-07-19 DIAGNOSIS — Z76.0 MEDICATION REFILL: ICD-10-CM

## 2023-07-19 DIAGNOSIS — I10 ESSENTIAL HYPERTENSION: ICD-10-CM

## 2023-07-19 DIAGNOSIS — K21.9 GASTROESOPHAGEAL REFLUX DISEASE WITHOUT ESOPHAGITIS: ICD-10-CM

## 2023-07-19 RX ORDER — LOSARTAN POTASSIUM 100 MG/1
TABLET ORAL
Qty: 90 TABLET | Refills: 1 | Status: SHIPPED | OUTPATIENT
Start: 2023-07-19

## 2023-07-19 RX ORDER — ATORVASTATIN CALCIUM 10 MG/1
TABLET, FILM COATED ORAL
Qty: 90 TABLET | Refills: 1 | Status: SHIPPED | OUTPATIENT
Start: 2023-07-19

## 2023-07-19 RX ORDER — ACETAMINOPHEN 160 MG
TABLET,DISINTEGRATING ORAL
Qty: 90 CAPSULE | Refills: 1 | Status: SHIPPED | OUTPATIENT
Start: 2023-07-19

## 2023-07-19 RX ORDER — FAMOTIDINE 40 MG/1
40 TABLET, FILM COATED ORAL EVERY EVENING
Qty: 90 TABLET | Refills: 1 | Status: SHIPPED | OUTPATIENT
Start: 2023-07-19

## 2023-07-19 RX ORDER — METOPROLOL SUCCINATE 50 MG/1
50 TABLET, EXTENDED RELEASE ORAL 2 TIMES DAILY
Qty: 180 TABLET | Refills: 1 | Status: SHIPPED | OUTPATIENT
Start: 2023-07-19 | End: 2024-01-15

## 2023-09-11 DIAGNOSIS — Z76.0 MEDICATION REFILL: ICD-10-CM

## 2023-09-11 RX ORDER — LORATADINE 10 MG/1
10 TABLET ORAL DAILY
Qty: 90 TABLET | Refills: 1 | Status: SHIPPED | OUTPATIENT
Start: 2023-09-11

## 2023-11-01 DIAGNOSIS — I10 ESSENTIAL HYPERTENSION: ICD-10-CM

## 2023-11-01 RX ORDER — HYDRALAZINE HYDROCHLORIDE 10 MG/1
10 TABLET, FILM COATED ORAL 3 TIMES DAILY
Qty: 270 TABLET | Refills: 0 | Status: SHIPPED | OUTPATIENT
Start: 2023-11-01 | End: 2024-01-30

## 2023-11-03 DIAGNOSIS — K21.9 GASTROESOPHAGEAL REFLUX DISEASE WITHOUT ESOPHAGITIS: ICD-10-CM

## 2023-11-03 DIAGNOSIS — Z76.0 MEDICATION REFILL: ICD-10-CM

## 2023-11-03 NOTE — TELEPHONE ENCOUNTER
aspirin EC 81 MG EC tablet   famotidine (PEPCID) 40 MG tablet  loratadine (CLARITIN) 10 MG tablet   Cholecalciferol (VITAMIN D3) 50 MCG (2000 UT) CAPS   Please send to [facility]

## 2023-11-06 RX ORDER — FAMOTIDINE 40 MG/1
40 TABLET, FILM COATED ORAL EVERY EVENING
Qty: 90 TABLET | Refills: 1 | Status: SHIPPED | OUTPATIENT
Start: 2023-11-06

## 2023-11-06 RX ORDER — LORATADINE 10 MG/1
10 TABLET ORAL DAILY
Qty: 90 TABLET | Refills: 1 | Status: SHIPPED | OUTPATIENT
Start: 2023-11-06

## 2023-11-06 RX ORDER — ACETAMINOPHEN 160 MG
TABLET,DISINTEGRATING ORAL
Qty: 90 CAPSULE | Refills: 1 | Status: SHIPPED | OUTPATIENT
Start: 2023-11-06

## 2024-01-12 ENCOUNTER — OFFICE VISIT (OUTPATIENT)
Dept: FAMILY MEDICINE CLINIC | Age: 64
End: 2024-01-12

## 2024-01-12 VITALS
OXYGEN SATURATION: 95 % | TEMPERATURE: 97.1 F | HEIGHT: 71 IN | HEART RATE: 89 BPM | WEIGHT: 279 LBS | DIASTOLIC BLOOD PRESSURE: 98 MMHG | SYSTOLIC BLOOD PRESSURE: 153 MMHG | RESPIRATION RATE: 19 BRPM | BODY MASS INDEX: 39.06 KG/M2

## 2024-01-12 DIAGNOSIS — L30.9 ECZEMA, UNSPECIFIED TYPE: ICD-10-CM

## 2024-01-12 DIAGNOSIS — E78.2 MIXED HYPERLIPIDEMIA: ICD-10-CM

## 2024-01-12 DIAGNOSIS — E55.9 VITAMIN D DEFICIENCY: ICD-10-CM

## 2024-01-12 DIAGNOSIS — E11.9 TYPE 2 DIABETES MELLITUS WITHOUT COMPLICATION, WITHOUT LONG-TERM CURRENT USE OF INSULIN (HCC): Primary | ICD-10-CM

## 2024-01-12 DIAGNOSIS — G47.9 RESTLESS SLEEPER: ICD-10-CM

## 2024-01-12 DIAGNOSIS — R73.9 HYPERGLYCEMIA: ICD-10-CM

## 2024-01-12 DIAGNOSIS — L03.211 FACIAL CELLULITIS: ICD-10-CM

## 2024-01-12 DIAGNOSIS — Z13.29 SCREENING FOR THYROID DISORDER: ICD-10-CM

## 2024-01-12 DIAGNOSIS — H00.013 HORDEOLUM EXTERNUM OF RIGHT EYE, UNSPECIFIED EYELID: ICD-10-CM

## 2024-01-12 DIAGNOSIS — R40.0 DAYTIME SOMNOLENCE: ICD-10-CM

## 2024-01-12 DIAGNOSIS — E66.01 SEVERE OBESITY (BMI 35.0-39.9) WITH COMORBIDITY (HCC): ICD-10-CM

## 2024-01-12 DIAGNOSIS — I10 ESSENTIAL HYPERTENSION: ICD-10-CM

## 2024-01-12 DIAGNOSIS — C61 PROSTATE CANCER METASTATIC TO PELVIS (HCC): ICD-10-CM

## 2024-01-12 DIAGNOSIS — R53.83 FATIGUE, UNSPECIFIED TYPE: ICD-10-CM

## 2024-01-12 DIAGNOSIS — C79.89 PROSTATE CANCER METASTATIC TO PELVIS (HCC): ICD-10-CM

## 2024-01-12 LAB — HBA1C MFR BLD: 5.7 %

## 2024-01-12 RX ORDER — AMOXICILLIN AND CLAVULANATE POTASSIUM 875; 125 MG/1; MG/1
1 TABLET, FILM COATED ORAL 2 TIMES DAILY
Qty: 20 TABLET | Refills: 0 | Status: SHIPPED | OUTPATIENT
Start: 2024-01-12 | End: 2024-01-22

## 2024-01-12 RX ORDER — ENZALUTAMIDE 40 MG/1
TABLET ORAL
COMMUNITY
Start: 2024-01-03

## 2024-01-12 RX ORDER — POLYMYXIN B SULFATE AND TRIMETHOPRIM 1; 10000 MG/ML; [USP'U]/ML
1 SOLUTION OPHTHALMIC EVERY 6 HOURS
Qty: 10 ML | Refills: 0 | Status: SHIPPED | OUTPATIENT
Start: 2024-01-12 | End: 2024-01-17

## 2024-01-12 RX ORDER — HYDRALAZINE HYDROCHLORIDE 25 MG/1
25 TABLET, FILM COATED ORAL 3 TIMES DAILY
Qty: 90 TABLET | Refills: 0 | Status: SHIPPED | OUTPATIENT
Start: 2024-01-12 | End: 2024-04-11

## 2024-01-12 SDOH — ECONOMIC STABILITY: TRANSPORTATION INSECURITY
IN THE PAST 12 MONTHS, HAS LACK OF TRANSPORTATION KEPT YOU FROM MEETINGS, WORK, OR FROM GETTING THINGS NEEDED FOR DAILY LIVING?: NO

## 2024-01-12 SDOH — ECONOMIC STABILITY: INCOME INSECURITY: IN THE LAST 12 MONTHS, WAS THERE A TIME WHEN YOU WERE NOT ABLE TO PAY THE MORTGAGE OR RENT ON TIME?: NO

## 2024-01-12 SDOH — ECONOMIC STABILITY: FOOD INSECURITY: WITHIN THE PAST 12 MONTHS, YOU WORRIED THAT YOUR FOOD WOULD RUN OUT BEFORE YOU GOT MONEY TO BUY MORE.: NEVER TRUE

## 2024-01-12 SDOH — ECONOMIC STABILITY: FOOD INSECURITY: WITHIN THE PAST 12 MONTHS, THE FOOD YOU BOUGHT JUST DIDN'T LAST AND YOU DIDN'T HAVE MONEY TO GET MORE.: NEVER TRUE

## 2024-01-12 SDOH — ECONOMIC STABILITY: TRANSPORTATION INSECURITY
IN THE PAST 12 MONTHS, HAS THE LACK OF TRANSPORTATION KEPT YOU FROM MEDICAL APPOINTMENTS OR FROM GETTING MEDICATIONS?: NO

## 2024-01-12 SDOH — ECONOMIC STABILITY: HOUSING INSECURITY: IN THE LAST 12 MONTHS, HOW MANY PLACES HAVE YOU LIVED?: 1

## 2024-01-12 ASSESSMENT — ENCOUNTER SYMPTOMS
COUGH: 0
NAUSEA: 0
CONSTIPATION: 0
CHOKING: 0
BACK PAIN: 0
CHEST TIGHTNESS: 0
SHORTNESS OF BREATH: 0
DIARRHEA: 0
VOMITING: 0
ABDOMINAL PAIN: 0
ABDOMINAL DISTENTION: 0
WHEEZING: 0

## 2024-01-12 ASSESSMENT — PATIENT HEALTH QUESTIONNAIRE - PHQ9
3. TROUBLE FALLING OR STAYING ASLEEP: 3
6. FEELING BAD ABOUT YOURSELF - OR THAT YOU ARE A FAILURE OR HAVE LET YOURSELF OR YOUR FAMILY DOWN: 0
SUM OF ALL RESPONSES TO PHQ QUESTIONS 1-9: 4
5. POOR APPETITE OR OVEREATING: 0
SUM OF ALL RESPONSES TO PHQ9 QUESTIONS 1 & 2: 0
4. FEELING TIRED OR HAVING LITTLE ENERGY: 1
SUM OF ALL RESPONSES TO PHQ QUESTIONS 1-9: 4
10. IF YOU CHECKED OFF ANY PROBLEMS, HOW DIFFICULT HAVE THESE PROBLEMS MADE IT FOR YOU TO DO YOUR WORK, TAKE CARE OF THINGS AT HOME, OR GET ALONG WITH OTHER PEOPLE: 0
9. THOUGHTS THAT YOU WOULD BE BETTER OFF DEAD, OR OF HURTING YOURSELF: 0
SUM OF ALL RESPONSES TO PHQ QUESTIONS 1-9: 4
SUM OF ALL RESPONSES TO PHQ QUESTIONS 1-9: 4
2. FEELING DOWN, DEPRESSED OR HOPELESS: 0
1. LITTLE INTEREST OR PLEASURE IN DOING THINGS: 0
7. TROUBLE CONCENTRATING ON THINGS, SUCH AS READING THE NEWSPAPER OR WATCHING TELEVISION: 0
8. MOVING OR SPEAKING SO SLOWLY THAT OTHER PEOPLE COULD HAVE NOTICED. OR THE OPPOSITE, BEING SO FIGETY OR RESTLESS THAT YOU HAVE BEEN MOVING AROUND A LOT MORE THAN USUAL: 0

## 2024-01-12 ASSESSMENT — SOCIAL DETERMINANTS OF HEALTH (SDOH)
WITHIN THE LAST YEAR, HAVE TO BEEN RAPED OR FORCED TO HAVE ANY KIND OF SEXUAL ACTIVITY BY YOUR PARTNER OR EX-PARTNER?: NO
WITHIN THE LAST YEAR, HAVE YOU BEEN KICKED, HIT, SLAPPED, OR OTHERWISE PHYSICALLY HURT BY YOUR PARTNER OR EX-PARTNER?: NO
WITHIN THE LAST YEAR, HAVE YOU BEEN AFRAID OF YOUR PARTNER OR EX-PARTNER?: NO
WITHIN THE LAST YEAR, HAVE YOU BEEN HUMILIATED OR EMOTIONALLY ABUSED IN OTHER WAYS BY YOUR PARTNER OR EX-PARTNER?: NO

## 2024-01-12 NOTE — PROGRESS NOTES
in due to MRSA infections    SPINE SURGERY  10/11/2011    INSERTION OF SPINAL CORD STIMULATOR    SPINE SURGERY  6/2012    change of spinal cord stimulator, Dr. Jerez, [facility]    TONSILLECTOMY  1972        The 10-year ASCVD risk score (Romain VILLALOBOS, et al., 2019) is: 21.1%    Values used to calculate the score:      Age: 63 years      Sex: Male      Is Non- : No      Diabetic: Yes      Tobacco smoker: No      Systolic Blood Pressure: 153 mmHg      Is BP treated: Yes      HDL Cholesterol: 45 mg/dL      Total Cholesterol: 138 mg/dL     Educational materials and/or home exercises printed for patient's review and were included inpatient instructions on his/her After Visit Summary and given to patient at the end of visit.     regarding above diagnosis, including possible risks and complications,  especially if left uncontrolled.     Counseled regarding the possible side effects, risks, benefits and alternatives to treatment; patient and/or guardian verbalizes understanding, agrees, feels comfortable with and wishes to proceed withabove treatment plan.     Advised patient to call with any new medication issues, and read all Rx infofrom pharmacy to assure aware of all possible risks and side effects of medication before taking.   age and gender appropriate health screening exams and vaccinations.  Advised patient regarding importance of keeping up with recommended health maintenance and to schedule as soon as possible if overdue, as this isimportant in assessing for undiagnosed pathology, especially cancer, as well as protecting against potentially harmful/life threatening disease.          Patient and/or guardian verbalizes understanding andagrees with above counseling, assessment and plan.     All questions answered.      An electronic signature was used to authenticate this note.    --Jesus Alberto Hughes, DO

## 2024-01-19 ENCOUNTER — OFFICE VISIT (OUTPATIENT)
Dept: FAMILY MEDICINE CLINIC | Age: 64
End: 2024-01-19
Payer: COMMERCIAL

## 2024-01-19 VITALS
DIASTOLIC BLOOD PRESSURE: 74 MMHG | BODY MASS INDEX: 39.48 KG/M2 | HEART RATE: 91 BPM | SYSTOLIC BLOOD PRESSURE: 138 MMHG | RESPIRATION RATE: 20 BRPM | TEMPERATURE: 97.8 F | OXYGEN SATURATION: 96 % | HEIGHT: 71 IN | WEIGHT: 282 LBS

## 2024-01-19 DIAGNOSIS — I10 ESSENTIAL HYPERTENSION: ICD-10-CM

## 2024-01-19 DIAGNOSIS — Z76.0 MEDICATION REFILL: ICD-10-CM

## 2024-01-19 DIAGNOSIS — E78.2 MIXED HYPERLIPIDEMIA: ICD-10-CM

## 2024-01-19 DIAGNOSIS — I10 ESSENTIAL HYPERTENSION: Primary | ICD-10-CM

## 2024-01-19 DIAGNOSIS — E55.9 VITAMIN D DEFICIENCY: ICD-10-CM

## 2024-01-19 DIAGNOSIS — R73.03 PRE-DIABETES: ICD-10-CM

## 2024-01-19 DIAGNOSIS — H00.013 HORDEOLUM EXTERNUM OF RIGHT EYE, UNSPECIFIED EYELID: ICD-10-CM

## 2024-01-19 DIAGNOSIS — Z82.49 FAMILY HISTORY OF ABDOMINAL AORTIC ANEURYSM (AAA): ICD-10-CM

## 2024-01-19 DIAGNOSIS — E66.01 SEVERE OBESITY (BMI 35.0-39.9) WITH COMORBIDITY (HCC): ICD-10-CM

## 2024-01-19 PROCEDURE — 99214 OFFICE O/P EST MOD 30 MIN: CPT | Performed by: NEUROMUSCULOSKELETAL MEDICINE & OMM

## 2024-01-19 PROCEDURE — 3078F DIAST BP <80 MM HG: CPT | Performed by: NEUROMUSCULOSKELETAL MEDICINE & OMM

## 2024-01-19 PROCEDURE — 3075F SYST BP GE 130 - 139MM HG: CPT | Performed by: NEUROMUSCULOSKELETAL MEDICINE & OMM

## 2024-01-19 PROCEDURE — G8484 FLU IMMUNIZE NO ADMIN: HCPCS | Performed by: NEUROMUSCULOSKELETAL MEDICINE & OMM

## 2024-01-19 PROCEDURE — 3017F COLORECTAL CA SCREEN DOC REV: CPT | Performed by: NEUROMUSCULOSKELETAL MEDICINE & OMM

## 2024-01-19 PROCEDURE — 1036F TOBACCO NON-USER: CPT | Performed by: NEUROMUSCULOSKELETAL MEDICINE & OMM

## 2024-01-19 PROCEDURE — G8417 CALC BMI ABV UP PARAM F/U: HCPCS | Performed by: NEUROMUSCULOSKELETAL MEDICINE & OMM

## 2024-01-19 PROCEDURE — G8427 DOCREV CUR MEDS BY ELIG CLIN: HCPCS | Performed by: NEUROMUSCULOSKELETAL MEDICINE & OMM

## 2024-01-19 RX ORDER — LOSARTAN POTASSIUM 100 MG/1
TABLET ORAL
Qty: 90 TABLET | Refills: 1 | Status: SHIPPED | OUTPATIENT
Start: 2024-01-19

## 2024-01-19 RX ORDER — LORATADINE 10 MG/1
10 TABLET ORAL DAILY
Qty: 90 TABLET | Refills: 1 | Status: SHIPPED | OUTPATIENT
Start: 2024-01-19

## 2024-01-19 RX ORDER — TIRZEPATIDE 2.5 MG/.5ML
2.5 INJECTION, SOLUTION SUBCUTANEOUS WEEKLY
Qty: 2 ML | Refills: 3 | Status: SHIPPED | OUTPATIENT
Start: 2024-01-19

## 2024-01-19 RX ORDER — ATORVASTATIN CALCIUM 10 MG/1
TABLET, FILM COATED ORAL
Qty: 90 TABLET | Refills: 1 | Status: SHIPPED | OUTPATIENT
Start: 2024-01-19

## 2024-01-19 RX ORDER — METOPROLOL SUCCINATE 50 MG/1
50 TABLET, EXTENDED RELEASE ORAL 2 TIMES DAILY
Qty: 180 TABLET | Refills: 1 | Status: SHIPPED | OUTPATIENT
Start: 2024-01-19 | End: 2024-07-17

## 2024-01-19 ASSESSMENT — ENCOUNTER SYMPTOMS
NAUSEA: 0
EYE REDNESS: 0
CHEST TIGHTNESS: 0
ABDOMINAL DISTENTION: 0
EYE ITCHING: 0
CONSTIPATION: 0
CHOKING: 0
SHORTNESS OF BREATH: 0
BACK PAIN: 0
FACIAL SWELLING: 0
VOMITING: 0
DIARRHEA: 0
EYE DISCHARGE: 0
WHEEZING: 0
EYE PAIN: 0
RECTAL PAIN: 0
BLOOD IN STOOL: 0
COUGH: 0
ABDOMINAL PAIN: 0

## 2024-01-19 NOTE — PROGRESS NOTES
Rick Ivey (:  1960) is a 63 y.o. male,Established patient, here for evaluation of the following chief complaint(s):  Hypertension (1 week follow up/)         ASSESSMENT/PLAN:  1. Essential hypertension  Comments:  much improved with increasing Hydralazine to 25mg tid.  Blood pressure at today's visit 138/74.  2. Vitamin D deficiency  Comments:  Chronic stable medical condition currently he is taking vitamin D3 2000 IUs daily.  3. Mixed hyperlipidemia  Comments:  Currently taking Lipitor 10 mg daily we will check lipid panel and treat accordingly.  4. Hordeolum externum of right eye, unspecified eyelid  Comments:  resolved, done with Polytrim ophth solution, and will complete Augmentin in 2-3 days.  5. Family history of abdominal aortic aneurysm (AAA)  Comments:  will order US abdomen for AAA screening.  Orders:  -     US ABDOMINAL AORTA LIMITED; Future  6. Pre-diabetes  Comments:  ordered Mounjaro 2.5mg SQ weekly.  Last hemoglobin A1c was 5.7.  Orders:  -     Tirzepatide (MOUNJARO) 2.5 MG/0.5ML SOPN SC injection; Inject 0.5 mLs into the skin once a week, Disp-2 mL, R-3Normal  7. Severe obesity (BMI 35.0-39.9) with comorbidity (HCC)      Return in about 6 weeks (around 3/4/2024) for to monitor medical conditions.         Subjective   SUBJECTIVE/OBJECTIVE:  HPI 63-year-old male here for blood pressure recheck from 1 week ago.  Also he is here for follow-up on his right eye stye with mild right periocular cellulitis.  I placed the patient on Augmentin 875 twice a day for 10 days and gave Polytrim ophthalmic drops 4 times a day for 5 days.  He has complete resolution of his.  orbital cellulitis on the right and his stye has resolved.    Review of Systems   Constitutional:  Negative for activity change, appetite change, chills, diaphoresis, fatigue and fever.   HENT:  Negative for congestion and facial swelling.    Eyes:  Negative for pain, discharge, redness, itching and visual disturbance.

## 2024-01-23 ENCOUNTER — TELEPHONE (OUTPATIENT)
Dept: FAMILY MEDICINE CLINIC | Age: 64
End: 2024-01-23

## 2024-01-23 NOTE — TELEPHONE ENCOUNTER
PA was completed for Maunjaro injection, sen to pt insurance today will await insurance response     Your PA has been faxed to the plan as a paper copy. Please contact the plan directly if you haven't received a determination in a typical timeframe.    You will be notified of the determination via fax.  How do I know if the plan approved the PA?  [x]Add Reminder to your Dashboard  Remind me in:     Contact plan to follow up on [id number]

## 2024-01-28 ENCOUNTER — HOSPITAL ENCOUNTER (OUTPATIENT)
Dept: SLEEP CENTER | Age: 64
Discharge: HOME | End: 2024-01-28
Payer: COMMERCIAL

## 2024-01-28 DIAGNOSIS — E66.01 SEVERE OBESITY (BMI 35.0-39.9) WITH COMORBIDITY (HCC): ICD-10-CM

## 2024-01-28 DIAGNOSIS — R40.0 DAYTIME SOMNOLENCE: ICD-10-CM

## 2024-01-28 DIAGNOSIS — G47.9 RESTLESS SLEEPER: ICD-10-CM

## 2024-01-28 PROCEDURE — 95811 POLYSOM 6/>YRS CPAP 4/> PARM: CPT

## 2024-01-29 VITALS
BODY MASS INDEX: 38.5 KG/M2 | HEIGHT: 71 IN | SYSTOLIC BLOOD PRESSURE: 159 MMHG | OXYGEN SATURATION: 93 % | DIASTOLIC BLOOD PRESSURE: 87 MMHG | HEART RATE: 79 BPM | WEIGHT: 275 LBS

## 2024-01-29 ASSESSMENT — SLEEP AND FATIGUE QUESTIONNAIRES
HOW LIKELY ARE YOU TO NOD OFF OR FALL ASLEEP IN A CAR, WHILE STOPPED FOR A FEW MINUTES IN TRAFFIC: 0
HOW LIKELY ARE YOU TO NOD OFF OR FALL ASLEEP WHILE SITTING QUIETLY AFTER LUNCH WITHOUT ALCOHOL: 0
HOW LIKELY ARE YOU TO NOD OFF OR FALL ASLEEP WHILE SITTING AND READING: 2
HOW LIKELY ARE YOU TO NOD OFF OR FALL ASLEEP WHILE SITTING AND TALKING TO SOMEONE: 0
HOW LIKELY ARE YOU TO NOD OFF OR FALL ASLEEP WHILE WATCHING TV: 2
ESS TOTAL SCORE: 8
HOW LIKELY ARE YOU TO NOD OFF OR FALL ASLEEP WHILE LYING DOWN TO REST IN THE AFTERNOON WHEN CIRCUMSTANCES PERMIT: 3
HOW LIKELY ARE YOU TO NOD OFF OR FALL ASLEEP WHILE SITTING INACTIVE IN A PUBLIC PLACE: 0

## 2024-02-07 ENCOUNTER — HOSPITAL ENCOUNTER (OUTPATIENT)
Age: 64
Discharge: HOME | End: 2024-02-07
Payer: COMMERCIAL

## 2024-02-07 ENCOUNTER — HOSPITAL ENCOUNTER (OUTPATIENT)
Dept: ULTRASOUND IMAGING | Age: 64
Discharge: HOME | End: 2024-02-09
Payer: COMMERCIAL

## 2024-02-07 DIAGNOSIS — E11.9 TYPE 2 DIABETES MELLITUS WITHOUT COMPLICATION, WITHOUT LONG-TERM CURRENT USE OF INSULIN (HCC): ICD-10-CM

## 2024-02-07 DIAGNOSIS — Z13.29 SCREENING FOR THYROID DISORDER: ICD-10-CM

## 2024-02-07 DIAGNOSIS — E78.2 MIXED HYPERLIPIDEMIA: ICD-10-CM

## 2024-02-07 DIAGNOSIS — E55.9 VITAMIN D DEFICIENCY: ICD-10-CM

## 2024-02-07 DIAGNOSIS — I10 ESSENTIAL HYPERTENSION: ICD-10-CM

## 2024-02-07 DIAGNOSIS — Z82.49 FAMILY HISTORY OF ABDOMINAL AORTIC ANEURYSM (AAA): ICD-10-CM

## 2024-02-07 LAB
25(OH)D3 SERPL-MCNC: 46.9 NG/ML (ref 30–100)
ALBUMIN SERPL-MCNC: 4.1 G/DL (ref 3.5–5.2)
ALP SERPL-CCNC: 100 U/L (ref 40–129)
ALT SERPL-CCNC: 42 U/L (ref 0–40)
ANION GAP SERPL CALCULATED.3IONS-SCNC: 12 MMOL/L (ref 7–16)
AST SERPL-CCNC: 34 U/L (ref 0–39)
BASOPHILS # BLD: 0.03 K/UL (ref 0–0.2)
BASOPHILS NFR BLD: 0 % (ref 0–2)
BILIRUB SERPL-MCNC: 0.5 MG/DL (ref 0–1.2)
BUN SERPL-MCNC: 10 MG/DL (ref 6–23)
CALCIUM SERPL-MCNC: 9.9 MG/DL (ref 8.6–10.2)
CHLORIDE SERPL-SCNC: 102 MMOL/L (ref 98–107)
CHOLEST SERPL-MCNC: 148 MG/DL
CO2 SERPL-SCNC: 28 MMOL/L (ref 22–29)
CREAT SERPL-MCNC: 0.9 MG/DL (ref 0.7–1.2)
EOSINOPHIL # BLD: 0.33 K/UL (ref 0.05–0.5)
EOSINOPHILS RELATIVE PERCENT: 5 % (ref 0–6)
ERYTHROCYTE [DISTWIDTH] IN BLOOD BY AUTOMATED COUNT: 12.6 % (ref 11.5–15)
GFR SERPL CREATININE-BSD FRML MDRD: >60 ML/MIN/1.73M2
GLUCOSE SERPL-MCNC: 97 MG/DL (ref 74–99)
HCT VFR BLD AUTO: 42.9 % (ref 37–54)
HDLC SERPL-MCNC: 40 MG/DL
HGB BLD-MCNC: 15 G/DL (ref 12.5–16.5)
IMM GRANULOCYTES # BLD AUTO: 0.05 K/UL (ref 0–0.58)
IMM GRANULOCYTES NFR BLD: 1 % (ref 0–5)
LDLC SERPL CALC-MCNC: 75 MG/DL
LYMPHOCYTES NFR BLD: 1.38 K/UL (ref 1.5–4)
LYMPHOCYTES RELATIVE PERCENT: 21 % (ref 20–42)
MCH RBC QN AUTO: 31.5 PG (ref 26–35)
MCHC RBC AUTO-ENTMCNC: 35 G/DL (ref 32–34.5)
MCV RBC AUTO: 90.1 FL (ref 80–99.9)
MONOCYTES NFR BLD: 0.57 K/UL (ref 0.1–0.95)
MONOCYTES NFR BLD: 9 % (ref 2–12)
NEUTROPHILS NFR BLD: 65 % (ref 43–80)
NEUTS SEG NFR BLD: 4.38 K/UL (ref 1.8–7.3)
PLATELET # BLD AUTO: 263 K/UL (ref 130–450)
PMV BLD AUTO: 8.6 FL (ref 7–12)
POTASSIUM SERPL-SCNC: 4.1 MMOL/L (ref 3.5–5)
PROT SERPL-MCNC: 7.4 G/DL (ref 6.4–8.3)
RBC # BLD AUTO: 4.76 M/UL (ref 3.8–5.8)
SODIUM SERPL-SCNC: 142 MMOL/L (ref 132–146)
TRIGL SERPL-MCNC: 164 MG/DL
TSH SERPL DL<=0.05 MIU/L-ACNC: 3.12 UIU/ML (ref 0.27–4.2)
VLDLC SERPL CALC-MCNC: 33 MG/DL
WBC OTHER # BLD: 6.7 K/UL (ref 4.5–11.5)

## 2024-02-07 PROCEDURE — 36415 COLL VENOUS BLD VENIPUNCTURE: CPT

## 2024-02-07 PROCEDURE — 76775 US EXAM ABDO BACK WALL LIM: CPT

## 2024-02-07 PROCEDURE — 80053 COMPREHEN METABOLIC PANEL: CPT

## 2024-02-07 PROCEDURE — 80061 LIPID PANEL: CPT

## 2024-02-07 PROCEDURE — 82306 VITAMIN D 25 HYDROXY: CPT

## 2024-02-07 PROCEDURE — 84443 ASSAY THYROID STIM HORMONE: CPT

## 2024-02-07 PROCEDURE — 85025 COMPLETE CBC W/AUTO DIFF WBC: CPT

## 2024-02-14 DIAGNOSIS — I10 ESSENTIAL HYPERTENSION: ICD-10-CM

## 2024-02-14 DIAGNOSIS — Z76.0 MEDICATION REFILL: ICD-10-CM

## 2024-02-14 RX ORDER — HYDRALAZINE HYDROCHLORIDE 25 MG/1
25 TABLET, FILM COATED ORAL 3 TIMES DAILY
Qty: 360 TABLET | Refills: 1 | Status: SHIPPED | OUTPATIENT
Start: 2024-02-14 | End: 2024-10-11

## 2024-02-14 RX ORDER — FLUTICASONE PROPIONATE 50 MCG
SPRAY, SUSPENSION (ML) NASAL
Qty: 16 G | Refills: 3 | Status: SHIPPED | OUTPATIENT
Start: 2024-02-14

## 2024-02-14 RX ORDER — IBUPROFEN 800 MG/1
800 TABLET ORAL EVERY 8 HOURS PRN
Qty: 90 TABLET | Refills: 3 | Status: SHIPPED | OUTPATIENT
Start: 2024-02-14

## 2024-02-14 NOTE — TELEPHONE ENCOUNTER
losartan (COZAAR) 100 MG tablet   hydrALAZINE (APRESOLINE) 25 MG tablet   fluticasone (FLONASE) 50 MCG/ACT nasal spray   ibuprofen (ADVIL;MOTRIN) 800 MG tablet     He would like a 90 day supply on the hydralazine  Please send to [facility]

## 2024-02-28 SDOH — HEALTH STABILITY: PHYSICAL HEALTH: ON AVERAGE, HOW MANY DAYS PER WEEK DO YOU ENGAGE IN MODERATE TO STRENUOUS EXERCISE (LIKE A BRISK WALK)?: 2 DAYS

## 2024-02-28 SDOH — HEALTH STABILITY: PHYSICAL HEALTH: ON AVERAGE, HOW MANY MINUTES DO YOU ENGAGE IN EXERCISE AT THIS LEVEL?: 10 MIN

## 2024-02-28 ASSESSMENT — PATIENT HEALTH QUESTIONNAIRE - PHQ9
SUM OF ALL RESPONSES TO PHQ QUESTIONS 1-9: 0
1. LITTLE INTEREST OR PLEASURE IN DOING THINGS: 0
SUM OF ALL RESPONSES TO PHQ QUESTIONS 1-9: 0
SUM OF ALL RESPONSES TO PHQ QUESTIONS 1-9: 0
2. FEELING DOWN, DEPRESSED OR HOPELESS: 0
SUM OF ALL RESPONSES TO PHQ QUESTIONS 1-9: 0
SUM OF ALL RESPONSES TO PHQ9 QUESTIONS 1 & 2: 0

## 2024-03-05 ENCOUNTER — OFFICE VISIT (OUTPATIENT)
Dept: SLEEP MEDICINE | Age: 64
End: 2024-03-05
Payer: COMMERCIAL

## 2024-03-05 VITALS
HEART RATE: 76 BPM | OXYGEN SATURATION: 96 % | DIASTOLIC BLOOD PRESSURE: 86 MMHG | SYSTOLIC BLOOD PRESSURE: 157 MMHG | RESPIRATION RATE: 16 BRPM | HEIGHT: 71 IN | WEIGHT: 274.91 LBS | BODY MASS INDEX: 38.49 KG/M2

## 2024-03-05 DIAGNOSIS — E66.9 OBESITY (BMI 30-39.9): ICD-10-CM

## 2024-03-05 DIAGNOSIS — G47.33 OSA (OBSTRUCTIVE SLEEP APNEA): Primary | ICD-10-CM

## 2024-03-05 PROCEDURE — 3079F DIAST BP 80-89 MM HG: CPT | Performed by: STUDENT IN AN ORGANIZED HEALTH CARE EDUCATION/TRAINING PROGRAM

## 2024-03-05 PROCEDURE — G8417 CALC BMI ABV UP PARAM F/U: HCPCS | Performed by: STUDENT IN AN ORGANIZED HEALTH CARE EDUCATION/TRAINING PROGRAM

## 2024-03-05 PROCEDURE — G8428 CUR MEDS NOT DOCUMENT: HCPCS | Performed by: STUDENT IN AN ORGANIZED HEALTH CARE EDUCATION/TRAINING PROGRAM

## 2024-03-05 PROCEDURE — G8484 FLU IMMUNIZE NO ADMIN: HCPCS | Performed by: STUDENT IN AN ORGANIZED HEALTH CARE EDUCATION/TRAINING PROGRAM

## 2024-03-05 PROCEDURE — 99204 OFFICE O/P NEW MOD 45 MIN: CPT | Performed by: STUDENT IN AN ORGANIZED HEALTH CARE EDUCATION/TRAINING PROGRAM

## 2024-03-05 PROCEDURE — 1036F TOBACCO NON-USER: CPT | Performed by: STUDENT IN AN ORGANIZED HEALTH CARE EDUCATION/TRAINING PROGRAM

## 2024-03-05 PROCEDURE — 3077F SYST BP >= 140 MM HG: CPT | Performed by: STUDENT IN AN ORGANIZED HEALTH CARE EDUCATION/TRAINING PROGRAM

## 2024-03-05 PROCEDURE — 3017F COLORECTAL CA SCREEN DOC REV: CPT | Performed by: STUDENT IN AN ORGANIZED HEALTH CARE EDUCATION/TRAINING PROGRAM

## 2024-03-05 ASSESSMENT — SLEEP AND FATIGUE QUESTIONNAIRES
HOW LIKELY ARE YOU TO NOD OFF OR FALL ASLEEP IN A CAR, WHILE STOPPED FOR A FEW MINUTES IN TRAFFIC: 0
HOW LIKELY ARE YOU TO NOD OFF OR FALL ASLEEP WHILE SITTING QUIETLY AFTER LUNCH WITHOUT ALCOHOL: 2
HOW LIKELY ARE YOU TO NOD OFF OR FALL ASLEEP WHILE LYING DOWN TO REST IN THE AFTERNOON WHEN CIRCUMSTANCES PERMIT: 3
HOW LIKELY ARE YOU TO NOD OFF OR FALL ASLEEP WHEN YOU ARE A PASSENGER IN A CAR FOR AN HOUR WITHOUT A BREAK: 2
HOW LIKELY ARE YOU TO NOD OFF OR FALL ASLEEP WHILE SITTING AND TALKING TO SOMEONE: 0
HOW LIKELY ARE YOU TO NOD OFF OR FALL ASLEEP WHILE SITTING INACTIVE IN A PUBLIC PLACE: 0
HOW LIKELY ARE YOU TO NOD OFF OR FALL ASLEEP WHILE SITTING AND READING: 2
ESS TOTAL SCORE: 11
HOW LIKELY ARE YOU TO NOD OFF OR FALL ASLEEP WHILE WATCHING TV: 2

## 2024-03-05 NOTE — PROGRESS NOTES
[facility]    Patient Name: Rick Ivey  Age: 63 y.o.   : 1960  Date of Visit: 3/5/24    Assessment and Plan:     1. ALEX (obstructive sleep apnea)  Discussed sleep study results, emphasized severity of study and diagnosis including possible clinical sequelae of microvascular disease including higher incidence of stroke, atrial fibrillation, hypertension, or other cognitive microvascular damage. Counseled on appropriate use of the device, such as using distilled water, daily cleaning of mask and tube with gentle soap and water or non-toxic wipes. Counseled on troubleshooting device (such as rainout) and being aware of the ambient humidity of the room.    2. Obesity (BMI 30-39.9)  Rec'd 10-20% weight loss of total body weight (if feasible). Patient instructed on proper nutrition and estimated total daily caloric expenditure for their height and weight. Discussed that ALEX may improve with weight loss, but there is no guarantee of reversal.       Return in about 3 months (around 2024).    History:    HPI   Rick Ivey is a 63 y.o. male with  has a past medical history of Acute drug-induced gout of left foot (2015), ADHD (attention deficit hyperactivity disorder), Back pain, chronic, Depression, Hyperlipidemia, Hypertension, Left elbow fracture (10/27/2015), MRSA (methicillin resistant staph aureus) culture positive, and Seasonal allergies. who presents as a new patient to Sleep Clinic, referred by Dr. Sissy guevara. provider found, for Sleep Apnea    - Here for HST results  - Noted severe ALEX on SpliT PSG  - Did very well with CPAP    PMH:  Past Medical History:   Diagnosis Date    Acute drug-induced gout of left foot 2015    Had gout attack on 7/18/15. Seems to be HCTZ induced.      ADHD (attention deficit hyperactivity disorder)     Back pain, chronic     Depression     Hyperlipidemia     Hypertension     Left elbow fracture 10/27/2015    MRSA (methicillin resistant staph

## 2024-03-07 SDOH — HEALTH STABILITY: PHYSICAL HEALTH: ON AVERAGE, HOW MANY DAYS PER WEEK DO YOU ENGAGE IN MODERATE TO STRENUOUS EXERCISE (LIKE A BRISK WALK)?: 2 DAYS

## 2024-03-07 SDOH — HEALTH STABILITY: PHYSICAL HEALTH: ON AVERAGE, HOW MANY MINUTES DO YOU ENGAGE IN EXERCISE AT THIS LEVEL?: 10 MIN

## 2024-03-07 ASSESSMENT — PATIENT HEALTH QUESTIONNAIRE - PHQ9
2. FEELING DOWN, DEPRESSED OR HOPELESS: 0
SUM OF ALL RESPONSES TO PHQ QUESTIONS 1-9: 0
1. LITTLE INTEREST OR PLEASURE IN DOING THINGS: 0
SUM OF ALL RESPONSES TO PHQ QUESTIONS 1-9: 0
SUM OF ALL RESPONSES TO PHQ9 QUESTIONS 1 & 2: 0
SUM OF ALL RESPONSES TO PHQ QUESTIONS 1-9: 0
SUM OF ALL RESPONSES TO PHQ QUESTIONS 1-9: 0

## 2024-03-07 ASSESSMENT — LIFESTYLE VARIABLES
HOW MANY STANDARD DRINKS CONTAINING ALCOHOL DO YOU HAVE ON A TYPICAL DAY: PATIENT DOES NOT DRINK
HOW OFTEN DO YOU HAVE A DRINK CONTAINING ALCOHOL: 1
HOW OFTEN DO YOU HAVE SIX OR MORE DRINKS ON ONE OCCASION: 1
HOW MANY STANDARD DRINKS CONTAINING ALCOHOL DO YOU HAVE ON A TYPICAL DAY: 0
HOW OFTEN DO YOU HAVE A DRINK CONTAINING ALCOHOL: NEVER

## 2024-03-08 ENCOUNTER — OFFICE VISIT (OUTPATIENT)
Dept: FAMILY MEDICINE CLINIC | Age: 64
End: 2024-03-08
Payer: COMMERCIAL

## 2024-03-08 VITALS
TEMPERATURE: 98.2 F | WEIGHT: 271.8 LBS | SYSTOLIC BLOOD PRESSURE: 128 MMHG | OXYGEN SATURATION: 95 % | HEIGHT: 71 IN | HEART RATE: 85 BPM | BODY MASS INDEX: 38.05 KG/M2 | DIASTOLIC BLOOD PRESSURE: 81 MMHG | RESPIRATION RATE: 18 BRPM

## 2024-03-08 DIAGNOSIS — E55.9 VITAMIN D DEFICIENCY: ICD-10-CM

## 2024-03-08 DIAGNOSIS — R73.03 PRE-DIABETES: ICD-10-CM

## 2024-03-08 DIAGNOSIS — I10 ESSENTIAL HYPERTENSION: ICD-10-CM

## 2024-03-08 DIAGNOSIS — E78.2 MIXED HYPERLIPIDEMIA: ICD-10-CM

## 2024-03-08 DIAGNOSIS — Z00.00 MEDICARE ANNUAL WELLNESS VISIT, SUBSEQUENT: Primary | ICD-10-CM

## 2024-03-08 PROCEDURE — 3079F DIAST BP 80-89 MM HG: CPT | Performed by: NEUROMUSCULOSKELETAL MEDICINE & OMM

## 2024-03-08 PROCEDURE — 3017F COLORECTAL CA SCREEN DOC REV: CPT | Performed by: NEUROMUSCULOSKELETAL MEDICINE & OMM

## 2024-03-08 PROCEDURE — G0439 PPPS, SUBSEQ VISIT: HCPCS | Performed by: NEUROMUSCULOSKELETAL MEDICINE & OMM

## 2024-03-08 PROCEDURE — 3074F SYST BP LT 130 MM HG: CPT | Performed by: NEUROMUSCULOSKELETAL MEDICINE & OMM

## 2024-03-08 PROCEDURE — G8484 FLU IMMUNIZE NO ADMIN: HCPCS | Performed by: NEUROMUSCULOSKELETAL MEDICINE & OMM

## 2024-03-08 RX ORDER — TIRZEPATIDE 5 MG/.5ML
5 INJECTION, SOLUTION SUBCUTANEOUS WEEKLY
Qty: 4 ADJUSTABLE DOSE PRE-FILLED PEN SYRINGE | Refills: 4 | Status: SHIPPED
Start: 2024-03-08 | End: 2024-03-08

## 2024-03-08 RX ORDER — TIRZEPATIDE 5 MG/.5ML
5 INJECTION, SOLUTION SUBCUTANEOUS WEEKLY
Qty: 4 ADJUSTABLE DOSE PRE-FILLED PEN SYRINGE | Refills: 4 | Status: SHIPPED | OUTPATIENT
Start: 2024-03-08

## 2024-03-08 RX ORDER — ATORVASTATIN CALCIUM 20 MG/1
20 TABLET, FILM COATED ORAL DAILY
Qty: 90 TABLET | Refills: 1 | Status: SHIPPED | OUTPATIENT
Start: 2024-03-08

## 2024-03-08 SDOH — ECONOMIC STABILITY: INCOME INSECURITY: HOW HARD IS IT FOR YOU TO PAY FOR THE VERY BASICS LIKE FOOD, HOUSING, MEDICAL CARE, AND HEATING?: NOT HARD AT ALL

## 2024-03-08 SDOH — ECONOMIC STABILITY: FOOD INSECURITY: WITHIN THE PAST 12 MONTHS, THE FOOD YOU BOUGHT JUST DIDN'T LAST AND YOU DIDN'T HAVE MONEY TO GET MORE.: NEVER TRUE

## 2024-03-08 SDOH — ECONOMIC STABILITY: FOOD INSECURITY: WITHIN THE PAST 12 MONTHS, YOU WORRIED THAT YOUR FOOD WOULD RUN OUT BEFORE YOU GOT MONEY TO BUY MORE.: NEVER TRUE

## 2024-03-08 ASSESSMENT — PATIENT HEALTH QUESTIONNAIRE - PHQ9
SUM OF ALL RESPONSES TO PHQ9 QUESTIONS 1 & 2: 0
SUM OF ALL RESPONSES TO PHQ QUESTIONS 1-9: 0
2. FEELING DOWN, DEPRESSED OR HOPELESS: 0
3. TROUBLE FALLING OR STAYING ASLEEP: 0
4. FEELING TIRED OR HAVING LITTLE ENERGY: 0
SUM OF ALL RESPONSES TO PHQ QUESTIONS 1-9: 0
5. POOR APPETITE OR OVEREATING: 0
9. THOUGHTS THAT YOU WOULD BE BETTER OFF DEAD, OR OF HURTING YOURSELF: 0
SUM OF ALL RESPONSES TO PHQ QUESTIONS 1-9: 0
SUM OF ALL RESPONSES TO PHQ QUESTIONS 1-9: 0
8. MOVING OR SPEAKING SO SLOWLY THAT OTHER PEOPLE COULD HAVE NOTICED. OR THE OPPOSITE, BEING SO FIGETY OR RESTLESS THAT YOU HAVE BEEN MOVING AROUND A LOT MORE THAN USUAL: 0
10. IF YOU CHECKED OFF ANY PROBLEMS, HOW DIFFICULT HAVE THESE PROBLEMS MADE IT FOR YOU TO DO YOUR WORK, TAKE CARE OF THINGS AT HOME, OR GET ALONG WITH OTHER PEOPLE: 0
7. TROUBLE CONCENTRATING ON THINGS, SUCH AS READING THE NEWSPAPER OR WATCHING TELEVISION: 0
1. LITTLE INTEREST OR PLEASURE IN DOING THINGS: 0
6. FEELING BAD ABOUT YOURSELF - OR THAT YOU ARE A FAILURE OR HAVE LET YOURSELF OR YOUR FAMILY DOWN: 0

## 2024-03-08 NOTE — PATIENT INSTRUCTIONS
review.    Other Preventive Recommendations:    A preventive eye exam performed by an eye specialist is recommended every 1-2 years to screen for glaucoma; cataracts, macular degeneration, and other eye disorders.  A preventive dental visit is recommended every 6 months.  Try to get at least 150 minutes of exercise per week or 10,000 steps per day on a pedometer .  Order or download the FREE \"Exercise & Physical Activity: Your Everyday Guide\" from [employer]. Call 1-542.478.7512 or search [employer] online.  You need 1856-8448 mg of calcium and 2167-5267 IU of vitamin D per day. It is possible to meet your calcium requirement with diet alone, but a vitamin D supplement is usually necessary to meet this goal.  When exposed to the sun, use a sunscreen that protects against both UVA and UVB radiation with an SPF of 30 or greater. Reapply every 2 to 3 hours or after sweating, drying off with a towel, or swimming.  Always wear a seat belt when traveling in a car. Always wear a helmet when riding a bicycle or motorcycle.

## 2024-03-08 NOTE — PROGRESS NOTES
(Non-Billable)        CV Risk Counseling:  Patient was asked about his current diet and exercise habits, and personalized advice was provided regarding recommended lifestyle changes. Patient's individual cardiovascular disease risk factors, including advanced age (> 55 for men, > 65 for women), dyslipidemia, hypertension, male gender, obesity (BMI >= 30), and sedentary lifestyle, were discussed, as well as the likely benefits of lifestyle changes. Based upon patient's motivation to change his behavior, the following plan was agreed upon to work toward lowering cardiovascular disease risk: low saturated fat, low cholesterol diet, Mediterranean diet, DASH diet, at least 150 minutes of exercise/week, and increase physical activity, as tolerated.  Aspirin use for primary prevention of cardiovascular disease for men 45-79 and women 55-79: Indicated- continue daily aspirin. Educational materials for lifestyle changes were provided. Patient will follow-up in 3 month(s) with PCP. Provider spent 5 minutes counseling patient.            Objective   Vitals:    03/08/24 1428   BP: 128/81   Pulse: 85   Resp: 18   Temp: 98.2 °F (36.8 °C)   TempSrc: Temporal   SpO2: 95%   Weight: 123.3 kg (271 lb 12.8 oz)   Height: 1.803 m (5' 10.98\")      Body mass index is 37.93 kg/m².      General Appearance: alert and oriented to person, place and time, well developed and well- nourished, in no acute distress  Skin: warm and dry, no rash or erythema  Head: normocephalic and atraumatic  Eyes: pupils equal, round, and reactive to light, extraocular eye movements intact, conjunctivae normal  ENT: tympanic membrane, external ear and ear canal normal bilaterally, nose without deformity, nasal mucosa and turbinates normal without polyps  Neck: supple and non-tender without mass, no thyromegaly or thyroid nodules, no cervical lymphadenopathy  Pulmonary/Chest: clear to auscultation bilaterally- no wheezes, rales or rhonchi, normal air movement, no

## 2024-03-22 ENCOUNTER — NURSE ONLY (OUTPATIENT)
Dept: FAMILY MEDICINE CLINIC | Age: 64
End: 2024-03-22

## 2024-03-22 DIAGNOSIS — R73.03 PRE-DIABETES: Primary | ICD-10-CM

## 2024-03-22 DIAGNOSIS — R73.03 PRE-DIABETES: ICD-10-CM

## 2024-03-22 LAB — HBA1C MFR BLD: 5.5 %

## 2024-04-24 DIAGNOSIS — K59.04 CHRONIC IDIOPATHIC CONSTIPATION: ICD-10-CM

## 2024-04-24 DIAGNOSIS — J30.2 SEASONAL ALLERGIC RHINITIS, UNSPECIFIED TRIGGER: ICD-10-CM

## 2024-04-24 DIAGNOSIS — I10 ESSENTIAL HYPERTENSION: ICD-10-CM

## 2024-04-24 RX ORDER — ALBUTEROL SULFATE 90 UG/1
2 AEROSOL, METERED RESPIRATORY (INHALATION) 4 TIMES DAILY PRN
Qty: 18 G | Refills: 3 | Status: SHIPPED | OUTPATIENT
Start: 2024-04-24

## 2024-04-24 RX ORDER — DOCUSATE SODIUM 100 MG/1
100 CAPSULE, LIQUID FILLED ORAL 2 TIMES DAILY PRN
Qty: 180 CAPSULE | Refills: 1 | Status: SHIPPED | OUTPATIENT
Start: 2024-04-24

## 2024-04-24 NOTE — TELEPHONE ENCOUNTER
Last Appointment:  3/8/2024  Future Appointments   Date Time Provider Department Center   6/4/2024  2:00 PM Stanley Avlarenga MD [facility]   6/12/2024  2:00 PM Jesus Alberto Hughes DO [facility]

## 2024-05-17 DIAGNOSIS — R73.03 PRE-DIABETES: ICD-10-CM

## 2024-05-17 RX ORDER — TIRZEPATIDE 5 MG/.5ML
5 INJECTION, SOLUTION SUBCUTANEOUS WEEKLY
Qty: 4 ADJUSTABLE DOSE PRE-FILLED PEN SYRINGE | Refills: 4 | Status: SHIPPED | OUTPATIENT
Start: 2024-05-17

## 2024-05-17 NOTE — TELEPHONE ENCOUNTER
PT CALLED IN, NEEDS A REFILL BUT IS ON BACK ORDER,WOULD LIKE TO INCREASE THE DOSAGE      Tirzepatide (MOUNJARO) 5 MG/0.5ML SOPN SC injection

## 2024-06-04 ENCOUNTER — OFFICE VISIT (OUTPATIENT)
Dept: SLEEP MEDICINE | Age: 64
End: 2024-06-04
Payer: COMMERCIAL

## 2024-06-04 VITALS
HEART RATE: 79 BPM | SYSTOLIC BLOOD PRESSURE: 156 MMHG | DIASTOLIC BLOOD PRESSURE: 78 MMHG | WEIGHT: 262.13 LBS | HEIGHT: 71 IN | BODY MASS INDEX: 36.7 KG/M2 | OXYGEN SATURATION: 98 % | RESPIRATION RATE: 18 BRPM

## 2024-06-04 DIAGNOSIS — G47.33 OSA (OBSTRUCTIVE SLEEP APNEA): Primary | ICD-10-CM

## 2024-06-04 DIAGNOSIS — E66.9 OBESITY (BMI 30-39.9): ICD-10-CM

## 2024-06-04 PROCEDURE — G8428 CUR MEDS NOT DOCUMENT: HCPCS | Performed by: STUDENT IN AN ORGANIZED HEALTH CARE EDUCATION/TRAINING PROGRAM

## 2024-06-04 PROCEDURE — 3017F COLORECTAL CA SCREEN DOC REV: CPT | Performed by: STUDENT IN AN ORGANIZED HEALTH CARE EDUCATION/TRAINING PROGRAM

## 2024-06-04 PROCEDURE — G8417 CALC BMI ABV UP PARAM F/U: HCPCS | Performed by: STUDENT IN AN ORGANIZED HEALTH CARE EDUCATION/TRAINING PROGRAM

## 2024-06-04 PROCEDURE — 1036F TOBACCO NON-USER: CPT | Performed by: STUDENT IN AN ORGANIZED HEALTH CARE EDUCATION/TRAINING PROGRAM

## 2024-06-04 PROCEDURE — 3077F SYST BP >= 140 MM HG: CPT | Performed by: STUDENT IN AN ORGANIZED HEALTH CARE EDUCATION/TRAINING PROGRAM

## 2024-06-04 PROCEDURE — 99214 OFFICE O/P EST MOD 30 MIN: CPT | Performed by: STUDENT IN AN ORGANIZED HEALTH CARE EDUCATION/TRAINING PROGRAM

## 2024-06-04 PROCEDURE — 3078F DIAST BP <80 MM HG: CPT | Performed by: STUDENT IN AN ORGANIZED HEALTH CARE EDUCATION/TRAINING PROGRAM

## 2024-06-04 ASSESSMENT — SLEEP AND FATIGUE QUESTIONNAIRES
HOW LIKELY ARE YOU TO NOD OFF OR FALL ASLEEP IN A CAR, WHILE STOPPED FOR A FEW MINUTES IN TRAFFIC: WOULD NEVER DOZE
HOW LIKELY ARE YOU TO NOD OFF OR FALL ASLEEP WHILE SITTING AND READING: WOULD NEVER DOZE
HOW LIKELY ARE YOU TO NOD OFF OR FALL ASLEEP WHILE WATCHING TV: SLIGHT CHANCE OF DOZING
HOW LIKELY ARE YOU TO NOD OFF OR FALL ASLEEP WHILE SITTING QUIETLY AFTER LUNCH WITHOUT ALCOHOL: SLIGHT CHANCE OF DOZING
HOW LIKELY ARE YOU TO NOD OFF OR FALL ASLEEP WHILE SITTING INACTIVE IN A PUBLIC PLACE: WOULD NEVER DOZE
HOW LIKELY ARE YOU TO NOD OFF OR FALL ASLEEP WHILE SITTING AND TALKING TO SOMEONE: WOULD NEVER DOZE
ESS TOTAL SCORE: 6
HOW LIKELY ARE YOU TO NOD OFF OR FALL ASLEEP WHEN YOU ARE A PASSENGER IN A CAR FOR AN HOUR WITHOUT A BREAK: SLIGHT CHANCE OF DOZING
HOW LIKELY ARE YOU TO NOD OFF OR FALL ASLEEP WHILE LYING DOWN TO REST IN THE AFTERNOON WHEN CIRCUMSTANCES PERMIT: HIGH CHANCE OF DOZING

## 2024-06-04 NOTE — PROGRESS NOTES
[facility]    Patient Name: Rick Ivey  Age: 63 y.o.   : 1960  Date of Visit: 24    Assessment and Plan:     1. ALEX (obstructive sleep apnea)  - F-U 1 year  - 45 events/hour down to 1.5 events  - Doing very well  - Recommend no CPAP use while having the sinus infection    2. Obesity (BMI 30-39.9)  Rec'd 10-20% weight loss of total body weight (if feasible). Patient instructed on proper nutrition and estimated total daily caloric expenditure for their height and weight. Discussed that ALEX may improve with weight loss, but there is no guarantee of reversal.     History:    HPI   Rick Ivey is a 63 y.o. male with  has a past medical history of Acute drug-induced gout of left foot (2015), ADHD (attention deficit hyperactivity disorder), Back pain, chronic, Depression, Hyperlipidemia, Hypertension, Left elbow fracture (10/27/2015), MRSA (methicillin resistant staph aureus) culture positive, and Seasonal allergies. who presents as a follow-up patient to Sleep Clinic for Sleep Apnea (Compliance. Had a hard using with allergies)    Usage days 24/30 days (80%) >= 4 hours 21 days (70%) < 4 hours 3 days (10%) Usage hours 155 hours 42 minutes Average usage (total days) 5 hours 11 minutes Average usage (days used) 6 hours 29 minutes Median usage (days used) 7 hours 18 minutes Total used hours (value since last reset - 2024) 155 hours AirSense 10 AutoSet Serial number [id number]     Mode AutoSet Min Pressure 8 cmH2O Max Pressure 15 cmH2O EPR Fulltime EPR level 3 Response Standard Therapy Pressure - cmH2O Median: 10.4 95th percentile: 13.2 Maximum: 14.1 Leaks - L/min Median: 0.5 95th percentile: 6.2 Maximum: 102.3 Events per hour AI: 1.1 HI: 0.6 AHI: 1.7 Apnea Index Central: 0.0 Obstructive: 0.9 Unknown: 0.2 RERA Index 0.4    - Doing very well  - Feels better with CPAP therapy  - Limited use over the last few weeks due to sinus infection  - But otherwise feels benefit of

## 2024-06-12 ENCOUNTER — OFFICE VISIT (OUTPATIENT)
Dept: FAMILY MEDICINE CLINIC | Age: 64
End: 2024-06-12

## 2024-06-12 VITALS
BODY MASS INDEX: 36.4 KG/M2 | RESPIRATION RATE: 19 BRPM | OXYGEN SATURATION: 96 % | WEIGHT: 260 LBS | HEART RATE: 86 BPM | HEIGHT: 71 IN | SYSTOLIC BLOOD PRESSURE: 127 MMHG | DIASTOLIC BLOOD PRESSURE: 81 MMHG | TEMPERATURE: 97 F

## 2024-06-12 DIAGNOSIS — K21.9 GASTROESOPHAGEAL REFLUX DISEASE WITHOUT ESOPHAGITIS: ICD-10-CM

## 2024-06-12 DIAGNOSIS — I10 ESSENTIAL HYPERTENSION: ICD-10-CM

## 2024-06-12 DIAGNOSIS — S29.012A STRAIN OF RIGHT RHOMBOID MUSCLE: ICD-10-CM

## 2024-06-12 DIAGNOSIS — E66.01 SEVERE OBESITY (BMI 35.0-39.9) WITH COMORBIDITY (HCC): ICD-10-CM

## 2024-06-12 DIAGNOSIS — E78.2 MIXED HYPERLIPIDEMIA: ICD-10-CM

## 2024-06-12 DIAGNOSIS — R73.03 PRE-DIABETES: ICD-10-CM

## 2024-06-12 DIAGNOSIS — L30.9 ECZEMA, UNSPECIFIED TYPE: ICD-10-CM

## 2024-06-12 DIAGNOSIS — Z91.030 BEE STING ALLERGY: Primary | ICD-10-CM

## 2024-06-12 DIAGNOSIS — G47.33 OSA ON CPAP: ICD-10-CM

## 2024-06-12 DIAGNOSIS — Z76.0 MEDICATION REFILL: ICD-10-CM

## 2024-06-12 RX ORDER — EPINEPHRINE 0.3 MG/.3ML
0.3 INJECTION SUBCUTANEOUS PRN
Qty: 1 EACH | Refills: 1 | Status: SHIPPED | OUTPATIENT
Start: 2024-06-12

## 2024-06-12 RX ORDER — LORATADINE 10 MG/1
10 TABLET ORAL DAILY
Qty: 90 TABLET | Refills: 1 | Status: SHIPPED | OUTPATIENT
Start: 2024-06-12

## 2024-06-12 RX ORDER — ACETAMINOPHEN 160 MG
TABLET,DISINTEGRATING ORAL
Qty: 90 CAPSULE | Refills: 1 | Status: SHIPPED | OUTPATIENT
Start: 2024-06-12

## 2024-06-12 RX ORDER — FAMOTIDINE 40 MG/1
40 TABLET, FILM COATED ORAL EVERY EVENING
Qty: 90 TABLET | Refills: 1 | Status: SHIPPED | OUTPATIENT
Start: 2024-06-12

## 2024-06-12 RX ORDER — METOPROLOL SUCCINATE 50 MG/1
50 TABLET, EXTENDED RELEASE ORAL 2 TIMES DAILY
Qty: 180 TABLET | Refills: 1 | Status: SHIPPED | OUTPATIENT
Start: 2024-06-12 | End: 2024-12-09

## 2024-06-12 RX ORDER — TIRZEPATIDE 7.5 MG/.5ML
7.5 INJECTION, SOLUTION SUBCUTANEOUS WEEKLY
Qty: 4 ADJUSTABLE DOSE PRE-FILLED PEN SYRINGE | Refills: 5 | Status: SHIPPED | OUTPATIENT
Start: 2024-06-12

## 2024-06-12 RX ORDER — LOSARTAN POTASSIUM 100 MG/1
TABLET ORAL
Qty: 90 TABLET | Refills: 1 | Status: SHIPPED | OUTPATIENT
Start: 2024-06-12

## 2024-06-12 ASSESSMENT — ENCOUNTER SYMPTOMS
BACK PAIN: 1
VOMITING: 0
SHORTNESS OF BREATH: 0
CONSTIPATION: 0
NAUSEA: 0
BLOOD IN STOOL: 0
CHOKING: 0
COLOR CHANGE: 0
DIARRHEA: 0
ABDOMINAL DISTENTION: 0
WHEEZING: 0
CHEST TIGHTNESS: 0
COUGH: 0

## 2024-06-12 NOTE — PROGRESS NOTES
Rick Ivey (:  1960) is a 63 y.o. male,Established patient, here for evaluation of the following chief complaint(s):  Hypertension (3 month check up)      Assessment & Plan   ASSESSMENT/PLAN:  1. Bee sting allergy  Comments:  Refilled patient's EpiPen sent to pharmacy today.  No recent issues with bee stings.  Orders:  -     EPINEPHrine (EPIPEN 2-EVANGELISTA) 0.3 MG/0.3ML SOAJ injection; Inject 0.3 mLs into the muscle as needed (bee) Use as directed for allergic reaction, Disp-1 each, R-1Normal  2. Essential hypertension  Comments:  Chronic condition well-controlled with optimal blood pressure reading today 127/81.  Continue same antihypertensive regimen.  Orders:  -     metoprolol succinate (TOPROL XL) 50 MG extended release tablet; Take 1 tablet by mouth 2 times daily, Disp-180 tablet, R-1Normal  -     losartan (COZAAR) 100 MG tablet; TAKE ONE TABLET BY MOUTH EVERY DAY, Disp-90 tablet, R-1Normal  3. Medication refill  -     loratadine (CLARITIN) 10 MG tablet; Take 1 tablet by mouth daily, Disp-90 tablet, R-1Normal  -     Cholecalciferol (VITAMIN D3) 50 MCG ( UT) CAPS; Take one tab po daily, Disp-90 capsule, R-1Normal  4. Eczema, unspecified type  Comments:  chronic condition well-controlled with Lidex cream 0.025%, refill called to pharmacy today.  Orders:  -     fluocinonide (LIDEX) 0.05 % cream; Apply topically 2 times daily., Disp-60 g, R-5, Normal  5. Gastroesophageal reflux disease without esophagitis  Comments:  Chronic condition well-controlled on Pepcid 40 mg at bedtime.  Orders:  -     famotidine (PEPCID) 40 MG tablet; Take 1 tablet by mouth every evening, Disp-90 tablet, R-1Normal  6. Pre-diabetes  Comments:  Last hemoglobin A1c was 5.5 in .  Will check A1c on or after 2024.  Orders:  -     Tirzepatide (MOUNJARO) 7.5 MG/0.5ML SOPN SC injection; Inject 0.5 mLs into the skin once a week, Disp-4 Adjustable Dose Pre-filled Pen Syringe, R-5Normal  -     POCT glycosylated

## 2024-07-05 ENCOUNTER — NURSE ONLY (OUTPATIENT)
Dept: FAMILY MEDICINE CLINIC | Age: 64
End: 2024-07-05
Payer: COMMERCIAL

## 2024-07-05 DIAGNOSIS — R73.01 ELEVATED FASTING GLUCOSE: Primary | ICD-10-CM

## 2024-07-05 DIAGNOSIS — R73.03 PRE-DIABETES: ICD-10-CM

## 2024-07-05 LAB — HBA1C MFR BLD: 5.2 %

## 2024-07-05 PROCEDURE — 83036 HEMOGLOBIN GLYCOSYLATED A1C: CPT | Performed by: NEUROMUSCULOSKELETAL MEDICINE & OMM

## 2024-07-05 PROCEDURE — 99999 PR OFFICE/OUTPT VISIT,PROCEDURE ONLY: CPT | Performed by: NEUROMUSCULOSKELETAL MEDICINE & OMM

## 2024-09-18 ENCOUNTER — OFFICE VISIT (OUTPATIENT)
Dept: FAMILY MEDICINE CLINIC | Age: 64
End: 2024-09-18

## 2024-09-18 VITALS
DIASTOLIC BLOOD PRESSURE: 74 MMHG | BODY MASS INDEX: 35.34 KG/M2 | OXYGEN SATURATION: 95 % | HEART RATE: 82 BPM | HEIGHT: 71 IN | WEIGHT: 252.4 LBS | RESPIRATION RATE: 20 BRPM | SYSTOLIC BLOOD PRESSURE: 114 MMHG | TEMPERATURE: 97.2 F

## 2024-09-18 DIAGNOSIS — I10 ESSENTIAL HYPERTENSION: Primary | ICD-10-CM

## 2024-09-18 DIAGNOSIS — E66.01 SEVERE OBESITY (BMI 35.0-39.9) WITH COMORBIDITY (HCC): ICD-10-CM

## 2024-09-18 DIAGNOSIS — E55.9 VITAMIN D DEFICIENCY: ICD-10-CM

## 2024-09-18 DIAGNOSIS — R73.03 PRE-DIABETES: ICD-10-CM

## 2024-09-18 DIAGNOSIS — Z23 NEED FOR INFLUENZA VACCINATION: ICD-10-CM

## 2024-09-18 DIAGNOSIS — G47.33 OSA ON CPAP: ICD-10-CM

## 2024-09-18 ASSESSMENT — ENCOUNTER SYMPTOMS
WHEEZING: 0
RECTAL PAIN: 0
COUGH: 0
BLOOD IN STOOL: 0
ANAL BLEEDING: 0
CHOKING: 0
DIARRHEA: 0
ABDOMINAL DISTENTION: 1
CONSTIPATION: 0
BACK PAIN: 0
CHEST TIGHTNESS: 0
ABDOMINAL PAIN: 1
SHORTNESS OF BREATH: 0
NAUSEA: 0
VOMITING: 0

## 2024-09-19 NOTE — TELEPHONE ENCOUNTER
Pt  Needs refills of hydrALAZINE (APRESOLINE) 10 MG tablet    metoprolol succinate (TOPROL XL) 100 MG extended release tablet     Please send to [facility]
Fall with Harm Risk

## 2024-09-27 ENCOUNTER — HOSPITAL ENCOUNTER (OUTPATIENT)
Age: 64
Discharge: HOME | End: 2024-09-27
Payer: COMMERCIAL

## 2024-09-27 PROCEDURE — 36415 COLL VENOUS BLD VENIPUNCTURE: CPT

## 2024-09-27 PROCEDURE — 84403 ASSAY OF TOTAL TESTOSTERONE: CPT

## 2024-09-27 PROCEDURE — 84153 ASSAY OF PSA TOTAL: CPT

## 2024-09-28 LAB
PSA SERPL-MCNC: <0.01 NG/ML (ref 0–4)
TESTOST SERPL-MCNC: 27 NG/DL (ref 193–740)

## 2024-10-04 DIAGNOSIS — I10 ESSENTIAL HYPERTENSION: ICD-10-CM

## 2024-10-04 DIAGNOSIS — E78.2 MIXED HYPERLIPIDEMIA: ICD-10-CM

## 2024-10-04 RX ORDER — HYDRALAZINE HYDROCHLORIDE 25 MG/1
25 TABLET, FILM COATED ORAL 3 TIMES DAILY
Qty: 360 TABLET | Refills: 1 | Status: SHIPPED | OUTPATIENT
Start: 2024-10-04 | End: 2025-06-01

## 2024-10-04 RX ORDER — ATORVASTATIN CALCIUM 20 MG/1
20 TABLET, FILM COATED ORAL DAILY
Qty: 90 TABLET | Refills: 1 | Status: SHIPPED | OUTPATIENT
Start: 2024-10-04

## 2024-11-18 ENCOUNTER — HOSPITAL ENCOUNTER (OUTPATIENT)
Age: 64
Discharge: HOME | End: 2024-11-18
Payer: COMMERCIAL

## 2024-11-18 DIAGNOSIS — E66.01 SEVERE OBESITY (BMI 35.0-39.9) WITH COMORBIDITY: ICD-10-CM

## 2024-11-18 DIAGNOSIS — R73.03 PRE-DIABETES: ICD-10-CM

## 2024-11-18 DIAGNOSIS — I10 ESSENTIAL HYPERTENSION: ICD-10-CM

## 2024-11-18 LAB
25(OH)D3 SERPL-MCNC: 45.4 NG/ML (ref 30–100)
ALBUMIN SERPL-MCNC: 4.1 G/DL (ref 3.5–5.2)
ALP SERPL-CCNC: 90 U/L (ref 40–129)
ALT SERPL-CCNC: 17 U/L (ref 0–40)
ANION GAP SERPL CALCULATED.3IONS-SCNC: 11 MMOL/L (ref 7–16)
AST SERPL-CCNC: 18 U/L (ref 0–39)
BASOPHILS # BLD: 0.04 K/UL (ref 0–0.2)
BASOPHILS NFR BLD: 1 % (ref 0–2)
BILIRUB SERPL-MCNC: 0.3 MG/DL (ref 0–1.2)
BUN SERPL-MCNC: 13 MG/DL (ref 6–23)
CALCIUM SERPL-MCNC: 9.1 MG/DL (ref 8.6–10.2)
CHLORIDE SERPL-SCNC: 104 MMOL/L (ref 98–107)
CHOLEST SERPL-MCNC: 191 MG/DL
CO2 SERPL-SCNC: 25 MMOL/L (ref 22–29)
CREAT SERPL-MCNC: 0.8 MG/DL (ref 0.7–1.2)
EOSINOPHIL # BLD: 0.27 K/UL (ref 0.05–0.5)
EOSINOPHILS RELATIVE PERCENT: 4 % (ref 0–6)
ERYTHROCYTE [DISTWIDTH] IN BLOOD BY AUTOMATED COUNT: 12.3 % (ref 11.5–15)
GFR, ESTIMATED: >90 ML/MIN/1.73M2
GLUCOSE SERPL-MCNC: 101 MG/DL (ref 74–99)
HBA1C MFR BLD: 5.3 % (ref 4–5.6)
HCT VFR BLD AUTO: 40.9 % (ref 37–54)
HDLC SERPL-MCNC: 45 MG/DL
HGB BLD-MCNC: 14.4 G/DL (ref 12.5–16.5)
IMM GRANULOCYTES # BLD AUTO: 0.04 K/UL (ref 0–0.58)
IMM GRANULOCYTES NFR BLD: 1 % (ref 0–5)
LDLC SERPL CALC-MCNC: 114 MG/DL
LYMPHOCYTES NFR BLD: 1.43 K/UL (ref 1.5–4)
LYMPHOCYTES RELATIVE PERCENT: 21 % (ref 20–42)
MCH RBC QN AUTO: 31.7 PG (ref 26–35)
MCHC RBC AUTO-ENTMCNC: 35.2 G/DL (ref 32–34.5)
MCV RBC AUTO: 90.1 FL (ref 80–99.9)
MONOCYTES NFR BLD: 0.51 K/UL (ref 0.1–0.95)
MONOCYTES NFR BLD: 7 % (ref 2–12)
NEUTROPHILS NFR BLD: 67 % (ref 43–80)
NEUTS SEG NFR BLD: 4.59 K/UL (ref 1.8–7.3)
PLATELET # BLD AUTO: 259 K/UL (ref 130–450)
PMV BLD AUTO: 8.8 FL (ref 7–12)
POTASSIUM SERPL-SCNC: 4 MMOL/L (ref 3.5–5)
PROT SERPL-MCNC: 7.2 G/DL (ref 6.4–8.3)
RBC # BLD AUTO: 4.54 M/UL (ref 3.8–5.8)
SODIUM SERPL-SCNC: 140 MMOL/L (ref 132–146)
TRIGL SERPL-MCNC: 158 MG/DL
TSH SERPL DL<=0.05 MIU/L-ACNC: 2.95 UIU/ML (ref 0.27–4.2)
VLDLC SERPL CALC-MCNC: 32 MG/DL
WBC OTHER # BLD: 6.9 K/UL (ref 4.5–11.5)

## 2024-11-18 PROCEDURE — 84443 ASSAY THYROID STIM HORMONE: CPT

## 2024-11-18 PROCEDURE — 85025 COMPLETE CBC W/AUTO DIFF WBC: CPT

## 2024-11-18 PROCEDURE — 83036 HEMOGLOBIN GLYCOSYLATED A1C: CPT

## 2024-11-18 PROCEDURE — 80053 COMPREHEN METABOLIC PANEL: CPT

## 2024-11-18 PROCEDURE — 80061 LIPID PANEL: CPT

## 2024-11-18 PROCEDURE — 82306 VITAMIN D 25 HYDROXY: CPT

## 2024-11-18 PROCEDURE — 36415 COLL VENOUS BLD VENIPUNCTURE: CPT

## 2024-11-20 ENCOUNTER — OFFICE VISIT (OUTPATIENT)
Dept: FAMILY MEDICINE CLINIC | Age: 64
End: 2024-11-20

## 2024-11-20 VITALS
WEIGHT: 252 LBS | TEMPERATURE: 98.2 F | OXYGEN SATURATION: 94 % | BODY MASS INDEX: 35.28 KG/M2 | HEIGHT: 71 IN | HEART RATE: 78 BPM | RESPIRATION RATE: 18 BRPM | SYSTOLIC BLOOD PRESSURE: 135 MMHG | DIASTOLIC BLOOD PRESSURE: 82 MMHG

## 2024-11-20 DIAGNOSIS — I10 ESSENTIAL HYPERTENSION: Primary | ICD-10-CM

## 2024-11-20 DIAGNOSIS — E11.9 TYPE 2 DIABETES MELLITUS WITHOUT COMPLICATION, WITHOUT LONG-TERM CURRENT USE OF INSULIN (HCC): ICD-10-CM

## 2024-11-20 DIAGNOSIS — E66.01 SEVERE OBESITY (BMI 35.0-39.9) WITH COMORBIDITY: ICD-10-CM

## 2024-11-20 DIAGNOSIS — E78.2 MIXED HYPERLIPIDEMIA: ICD-10-CM

## 2024-11-20 DIAGNOSIS — G47.33 OSA ON CPAP: ICD-10-CM

## 2024-11-20 DIAGNOSIS — E55.9 VITAMIN D DEFICIENCY: ICD-10-CM

## 2024-11-20 RX ORDER — TIRZEPATIDE 10 MG/.5ML
10 INJECTION, SOLUTION SUBCUTANEOUS WEEKLY
Qty: 2 ML | Refills: 3 | Status: SHIPPED | OUTPATIENT
Start: 2024-11-20

## 2024-11-20 RX ORDER — ATORVASTATIN CALCIUM 40 MG/1
40 TABLET, FILM COATED ORAL DAILY
Qty: 90 TABLET | Refills: 3 | Status: SHIPPED | OUTPATIENT
Start: 2024-11-20

## 2024-11-20 ASSESSMENT — ENCOUNTER SYMPTOMS
BLOOD IN STOOL: 0
COUGH: 0
DIARRHEA: 0
CHOKING: 0
ABDOMINAL DISTENTION: 0
WHEEZING: 0
SHORTNESS OF BREATH: 0
CONSTIPATION: 0
CHEST TIGHTNESS: 0
NAUSEA: 0
ABDOMINAL PAIN: 0
VOMITING: 0

## 2024-11-20 NOTE — PROGRESS NOTES
Rick Ivey (:  1960) is a 64 y.o. male, Established patient, here for evaluation of the following chief complaint(s):  Cholesterol Problem         Assessment & Plan  1. Hyperlipidemia.  His lipid panel showed an LDL of 114, triglycerides 158, and HDL 45. The dosage of Lipitor (atorvastatin) will be increased to 40 mg to help lower the LDL levels. He is advised to take two 20 mg tablets concurrently until the new prescription is available. A prescription for 40 mg Lipitor for 90 days will be sent to [facility].    2. Prediabetes.  His A1c was good at 5.3, but his fasting blood sugar was slightly elevated at 101. He is advised to maintain a balanced diet and monitor his blood sugar levels.    3. Hypertension.  His blood pressure was well-controlled at 135/82. He occasionally checks his blood pressure at home, with readings around 120/140. No changes to his current hypertension management plan are needed.    4. Weight management.  He has been stagnant around 250 lbs for the past 3 weeks. The dosage of Mounjaro will be increased to 10 mg to aid in weight loss. A prescription for 10 mg Mounjaro for 90 days will be sent to [facility].    Follow-up  Patient will return in 3 months for follow up.    Results  Laboratory Studies  LDL cholesterol 114, triglycerides 158, HDL cholesterol 45. A1c 5.3. Blood sugar 101. Vitamin D level 45.4. Thyroid level 2.95.  1. Essential hypertension  Comments:  chronic condition well controlled with optimal blood pressure at 135/82 today, continue same antihypertensive regimen.  2. Mixed hyperlipidemia  Comments:  chronic conditon well controlled. Last lipid panel reviewed with patient, all questions and concerns were addressed. will increase Lipitor to 40mg once a day  Orders:  -     atorvastatin (LIPITOR) 40 MG tablet; Take 1 tablet by mouth daily TAKE ONE TABLET BY MOUTH EVERY DAY, Disp-90 tablet, R-3Normal  3. Vitamin D deficiency  4. ALEX on

## 2024-11-29 ENCOUNTER — OFFICE VISIT (OUTPATIENT)
Dept: PRIMARY CARE CLINIC | Age: 64
End: 2024-11-29
Payer: COMMERCIAL

## 2024-11-29 VITALS
HEART RATE: 78 BPM | OXYGEN SATURATION: 98 % | WEIGHT: 255 LBS | BODY MASS INDEX: 35.7 KG/M2 | TEMPERATURE: 97.2 F | HEIGHT: 71 IN | DIASTOLIC BLOOD PRESSURE: 76 MMHG | SYSTOLIC BLOOD PRESSURE: 116 MMHG

## 2024-11-29 DIAGNOSIS — H10.021 PINK EYE DISEASE OF RIGHT EYE: Primary | ICD-10-CM

## 2024-11-29 DIAGNOSIS — H57.9 ITCH OF RIGHT EYE: ICD-10-CM

## 2024-11-29 PROCEDURE — G8427 DOCREV CUR MEDS BY ELIG CLIN: HCPCS | Performed by: EMERGENCY MEDICINE

## 2024-11-29 PROCEDURE — 99213 OFFICE O/P EST LOW 20 MIN: CPT | Performed by: EMERGENCY MEDICINE

## 2024-11-29 PROCEDURE — G8417 CALC BMI ABV UP PARAM F/U: HCPCS | Performed by: EMERGENCY MEDICINE

## 2024-11-29 PROCEDURE — 3074F SYST BP LT 130 MM HG: CPT | Performed by: EMERGENCY MEDICINE

## 2024-11-29 PROCEDURE — 1036F TOBACCO NON-USER: CPT | Performed by: EMERGENCY MEDICINE

## 2024-11-29 PROCEDURE — 3078F DIAST BP <80 MM HG: CPT | Performed by: EMERGENCY MEDICINE

## 2024-11-29 PROCEDURE — 3017F COLORECTAL CA SCREEN DOC REV: CPT | Performed by: EMERGENCY MEDICINE

## 2024-11-29 PROCEDURE — G8484 FLU IMMUNIZE NO ADMIN: HCPCS | Performed by: EMERGENCY MEDICINE

## 2024-11-29 RX ORDER — ERYTHROMYCIN 5 MG/G
OINTMENT OPHTHALMIC
Qty: 3.5 G | Refills: 0 | Status: SHIPPED | OUTPATIENT
Start: 2024-11-29 | End: 2024-12-09

## 2024-11-29 ASSESSMENT — ENCOUNTER SYMPTOMS
VOMITING: 0
DIARRHEA: 0
SINUS PRESSURE: 0
NAUSEA: 0
SORE THROAT: 0
EYE ITCHING: 1
EYE PAIN: 0
SHORTNESS OF BREATH: 0
EYE REDNESS: 1
EYE DISCHARGE: 0
COUGH: 0
WHEEZING: 0
ABDOMINAL PAIN: 0
BACK PAIN: 0
PHOTOPHOBIA: 0

## 2024-11-29 ASSESSMENT — VISUAL ACUITY: OU: 1

## 2024-11-29 NOTE — PROGRESS NOTES
Chief Complaint:   Eye Problem (Right eye redness, swelling since Wednesday )      History of Present Illness   HPI:  Rick Ivey is a 64 y.o. male who [facility] R eye red, itchy, minimal matting    Prior to Visit Medications    Medication Sig Taking? Authorizing Provider   erythromycin (ROMYCIN) 5 MG/GM ophthalmic ointment Apply thin layer to lower eyelid OD -- QID x 7 days Yes Brenton Brewster DO   atorvastatin (LIPITOR) 40 MG tablet Take 1 tablet by mouth daily TAKE ONE TABLET BY MOUTH EVERY DAY Yes Jesus Alberto Hughes DO   Tirzepatide (MOUNJARO) 10 MG/0.5ML SOAJ Inject 10 mg into the skin once a week Yes Jesus Alberto Hughes DO   hydrALAZINE (APRESOLINE) 25 MG tablet Take 1 tablet by mouth 3 times daily Yes Jesus Alberto Hughes DO   metoprolol succinate (TOPROL XL) 50 MG extended release tablet Take 1 tablet by mouth 2 times daily Yes Jesus Alberto Hughes DO   losartan (COZAAR) 100 MG tablet TAKE ONE TABLET BY MOUTH EVERY DAY Yes Jesus Alberto Hughes DO   loratadine (CLARITIN) 10 MG tablet Take 1 tablet by mouth daily Yes Jesus Alberto Hughes DO   Cholecalciferol (VITAMIN D3) 50 MCG (2000 UT) CAPS Take one tab po daily Yes Jesus Alberto Hughes DO   famotidine (PEPCID) 40 MG tablet Take 1 tablet by mouth every evening Yes Jesus Alberto Hughes DO   EPINEPHrine (EPIPEN 2-EVANGELISTA) 0.3 MG/0.3ML SOAJ injection Inject 0.3 mLs into the muscle as needed (bee) Use as directed for allergic reaction Yes Jesus Alberto Hughes DO   albuterol sulfate HFA (VENTOLIN HFA) 108 (90 Base) MCG/ACT inhaler Inhale 2 puffs into the lungs 4 times daily as needed for Wheezing Yes Jesus Alberto Hughes DO   docusate sodium (DOK) 100 MG capsule Take 1 capsule by mouth 2 times daily as needed for Constipation Yes Jesus Alberto Hughes DO   fluticasone (FLONASE) 50 MCG/ACT nasal spray PLACE 1 OR 2 SPRAYS INTO EACH NOSTRIL ONCE DAILY AS DIRECTED Yes Jesus Alberto Hughes, DO   ibuprofen (ADVIL;MOTRIN) 800

## 2025-01-08 DIAGNOSIS — K21.9 GASTROESOPHAGEAL REFLUX DISEASE WITHOUT ESOPHAGITIS: ICD-10-CM

## 2025-01-08 DIAGNOSIS — Z76.0 MEDICATION REFILL: ICD-10-CM

## 2025-01-08 DIAGNOSIS — I10 ESSENTIAL HYPERTENSION: ICD-10-CM

## 2025-01-08 RX ORDER — IBUPROFEN 800 MG/1
800 TABLET, FILM COATED ORAL EVERY 8 HOURS PRN
Qty: 90 TABLET | Refills: 5 | Status: SHIPPED | OUTPATIENT
Start: 2025-01-08

## 2025-01-08 RX ORDER — LOSARTAN POTASSIUM 100 MG/1
TABLET ORAL
Qty: 90 TABLET | Refills: 1 | Status: SHIPPED | OUTPATIENT
Start: 2025-01-08

## 2025-01-08 RX ORDER — ACETAMINOPHEN 160 MG
TABLET,DISINTEGRATING ORAL
Qty: 90 CAPSULE | Refills: 1 | Status: SHIPPED | OUTPATIENT
Start: 2025-01-08

## 2025-01-08 RX ORDER — LORATADINE 10 MG/1
10 TABLET ORAL DAILY
Qty: 90 TABLET | Refills: 1 | Status: SHIPPED | OUTPATIENT
Start: 2025-01-08

## 2025-01-08 RX ORDER — FAMOTIDINE 40 MG/1
40 TABLET, FILM COATED ORAL EVERY EVENING
Qty: 90 TABLET | Refills: 1 | Status: SHIPPED | OUTPATIENT
Start: 2025-01-08

## 2025-01-08 NOTE — TELEPHONE ENCOUNTER
Name of Medication(s) Requested:  Requested Prescriptions     Pending Prescriptions Disp Refills    ibuprofen (ADVIL;MOTRIN) 800 MG tablet 90 tablet 5     Sig: Take 1 tablet by mouth every 8 hours as needed for Pain    losartan (COZAAR) 100 MG tablet 90 tablet 1     Sig: TAKE ONE TABLET BY MOUTH EVERY DAY    loratadine (CLARITIN) 10 MG tablet 90 tablet 1     Sig: Take 1 tablet by mouth daily    vitamin D (VITAMIN D3) 50 MCG (2000 UT) CAPS capsule 90 capsule 1     Sig: Take one tab po daily    famotidine (PEPCID) 40 MG tablet 90 tablet 1     Sig: Take 1 tablet by mouth every evening       Medication is on current medication list Yes    Dosage and directions were verified? Yes    Quantity verified: 90 day supply     Pharmacy Verified?  Yes    Last Appointment:  11/20/2024    Future appts:  Future Appointments   Date Time Provider Department Center   3/21/2025  2:00 PM Jesus Alberto Hughes DO [place] [facility]   6/3/2025  2:00 PM Stanley Alvarenga MD [place] [facility]        (If no appt send self scheduling link. .REFILLAPPT)  Scheduling request sent?     [] Yes  [x] No    Does patient need updated?  [] Yes  [x] No

## 2025-03-18 DIAGNOSIS — E66.01 SEVERE OBESITY (BMI 35.0-39.9) WITH COMORBIDITY: ICD-10-CM

## 2025-03-18 DIAGNOSIS — E11.9 TYPE 2 DIABETES MELLITUS WITHOUT COMPLICATION, WITHOUT LONG-TERM CURRENT USE OF INSULIN: ICD-10-CM

## 2025-03-18 RX ORDER — TIRZEPATIDE 10 MG/.5ML
10 INJECTION, SOLUTION SUBCUTANEOUS WEEKLY
Qty: 2 ML | Refills: 3 | Status: SHIPPED | OUTPATIENT
Start: 2025-03-18

## 2025-03-18 NOTE — TELEPHONE ENCOUNTER
Name of Medication(s) Requested:  Requested Prescriptions     Pending Prescriptions Disp Refills    Tirzepatide (MOUNJARO) 10 MG/0.5ML SOAJ 2 mL 3     Sig: Inject 10 mg into the skin once a week       Medication is on current medication list Yes    Dosage and directions were verified? Yes    Quantity verified: 30 day supply     Pharmacy Verified?  Yes    Last Appointment:  11/20/2024    Future appts:  Future Appointments   Date Time Provider Department Center   3/21/2025  2:00 PM Jesus Alberto Hughes DO [place] [facility]   6/3/2025  2:00 PM Stanley Alvarenga MD [place] [facility]        (If no appt send self scheduling link. .REFILLAPPT)  Scheduling request sent?     [] Yes  [x] No    Does patient need updated?  [] Yes  [x] No

## 2025-03-21 ENCOUNTER — OFFICE VISIT (OUTPATIENT)
Dept: FAMILY MEDICINE CLINIC | Age: 65
End: 2025-03-21

## 2025-03-21 VITALS
SYSTOLIC BLOOD PRESSURE: 127 MMHG | DIASTOLIC BLOOD PRESSURE: 84 MMHG | TEMPERATURE: 97.1 F | WEIGHT: 250 LBS | RESPIRATION RATE: 18 BRPM | OXYGEN SATURATION: 96 % | BODY MASS INDEX: 37.03 KG/M2 | HEART RATE: 77 BPM | HEIGHT: 69 IN

## 2025-03-21 DIAGNOSIS — E11.9 TYPE 2 DIABETES MELLITUS WITHOUT COMPLICATION, WITHOUT LONG-TERM CURRENT USE OF INSULIN: ICD-10-CM

## 2025-03-21 DIAGNOSIS — E55.9 VITAMIN D DEFICIENCY: ICD-10-CM

## 2025-03-21 DIAGNOSIS — I10 ESSENTIAL HYPERTENSION: ICD-10-CM

## 2025-03-21 DIAGNOSIS — R09.82 PND (POST-NASAL DRIP): ICD-10-CM

## 2025-03-21 DIAGNOSIS — E78.2 MIXED HYPERLIPIDEMIA: ICD-10-CM

## 2025-03-21 DIAGNOSIS — Z00.00 MEDICARE ANNUAL WELLNESS VISIT, SUBSEQUENT: Primary | ICD-10-CM

## 2025-03-21 DIAGNOSIS — G47.33 OSA ON CPAP: ICD-10-CM

## 2025-03-21 DIAGNOSIS — E66.01 SEVERE OBESITY (BMI 35.0-39.9) WITH COMORBIDITY: ICD-10-CM

## 2025-03-21 DIAGNOSIS — J01.90 ACUTE BACTERIAL SINUSITIS: ICD-10-CM

## 2025-03-21 DIAGNOSIS — Z76.0 MEDICATION REFILL: ICD-10-CM

## 2025-03-21 DIAGNOSIS — B96.89 ACUTE BACTERIAL SINUSITIS: ICD-10-CM

## 2025-03-21 LAB
CREATININE URINE: 314.5 MG/DL (ref 40–278)
MICROALBUMIN/CREAT 24H UR: 34 MG/L (ref 0–19)
MICROALBUMIN/CREAT UR-RTO: 11 MCG/MG CREAT (ref 0–30)

## 2025-03-21 RX ORDER — IBUPROFEN 800 MG/1
800 TABLET, FILM COATED ORAL DAILY PRN
Qty: 90 TABLET | Refills: 0 | Status: SHIPPED | OUTPATIENT
Start: 2025-03-21

## 2025-03-21 RX ORDER — AZELASTINE 1 MG/ML
2 SPRAY, METERED NASAL 2 TIMES DAILY
Qty: 120 ML | Refills: 3 | Status: SHIPPED | OUTPATIENT
Start: 2025-03-21

## 2025-03-21 RX ORDER — AZITHROMYCIN 250 MG/1
250 TABLET, FILM COATED ORAL SEE ADMIN INSTRUCTIONS
Qty: 6 TABLET | Refills: 0 | Status: SHIPPED | OUTPATIENT
Start: 2025-03-21 | End: 2025-03-26

## 2025-03-21 SDOH — ECONOMIC STABILITY: FOOD INSECURITY: WITHIN THE PAST 12 MONTHS, YOU WORRIED THAT YOUR FOOD WOULD RUN OUT BEFORE YOU GOT THE MONEY TO BUY MORE.: NEVER TRUE

## 2025-03-21 SDOH — ECONOMIC STABILITY: FOOD INSECURITY: HOW HARD IS IT FOR YOU TO PAY FOR THE VERY BASICS LIKE FOOD, HOUSING, MEDICAL CARE, AND HEATING?: NOT HARD AT ALL

## 2025-03-21 SDOH — SOCIAL STABILITY: SOCIAL INSECURITY
WITHIN THE LAST YEAR, HAVE YOU BEEN KICKED, HIT, SLAPPED, OR OTHERWISE PHYSICALLY HURT BY YOUR PARTNER OR EX-PARTNER?: NO

## 2025-03-21 SDOH — ECONOMIC STABILITY: TRANSPORTATION INSECURITY: IN THE PAST 12 MONTHS, HAS LACK OF TRANSPORTATION KEPT YOU FROM MEDICAL APPOINTMENTS OR FROM GETTING MEDICATIONS?: NO

## 2025-03-21 SDOH — SOCIAL STABILITY: SOCIAL NETWORK: HOW OFTEN DO YOU ATTEND MEETINGS OF THE CLUBS OR ORGANIZATIONS YOU BELONG TO?: MORE THAN 4 TIMES PER YEAR

## 2025-03-21 SDOH — ECONOMIC STABILITY: FOOD INSECURITY: WITHIN THE PAST 12 MONTHS, THE FOOD YOU BOUGHT JUST DIDN'T LAST AND YOU DIDN'T HAVE MONEY TO GET MORE.: NEVER TRUE

## 2025-03-21 SDOH — SOCIAL STABILITY: SOCIAL NETWORK
DO YOU BELONG TO ANY CLUBS OR ORGANIZATIONS SUCH AS CHURCH GROUPS, UNIONS, FRATERNAL OR ATHLETIC GROUPS, OR SCHOOL GROUPS?: YES

## 2025-03-21 SDOH — HEALTH STABILITY: MENTAL HEALTH: HOW MANY DRINKS CONTAINING ALCOHOL DO YOU HAVE ON A TYPICAL DAY WHEN YOU ARE DRINKING?: PATIENT DOES NOT DRINK

## 2025-03-21 SDOH — HEALTH STABILITY: MENTAL HEALTH: HOW OFTEN DO YOU HAVE A DRINK CONTAINING ALCOHOL?: NEVER

## 2025-03-21 SDOH — ECONOMIC STABILITY: FOOD INSECURITY: WITHIN THE PAST 12 MONTHS, YOU WORRIED THAT YOUR FOOD WOULD RUN OUT BEFORE YOU GOT MONEY TO BUY MORE.: NEVER TRUE

## 2025-03-21 SDOH — SOCIAL STABILITY: SOCIAL INSECURITY: WITHIN THE LAST YEAR, HAVE YOU BEEN HUMILIATED OR EMOTIONALLY ABUSED IN OTHER WAYS BY YOUR PARTNER OR EX-PARTNER?: NO

## 2025-03-21 SDOH — SOCIAL STABILITY: SOCIAL INSECURITY
WITHIN THE LAST YEAR, HAVE YOU BEEN RAPED OR FORCED TO HAVE ANY KIND OF SEXUAL ACTIVITY BY YOUR PARTNER OR EX-PARTNER?: NO

## 2025-03-21 SDOH — SOCIAL STABILITY: SOCIAL INSECURITY: WITHIN THE LAST YEAR, HAVE YOU BEEN AFRAID OF YOUR PARTNER OR EX-PARTNER?: NO

## 2025-03-21 SDOH — SOCIAL STABILITY: SOCIAL NETWORK: IN A TYPICAL WEEK, HOW MANY TIMES DO YOU TALK ON THE PHONE WITH FAMILY, FRIENDS, OR NEIGHBORS?: ONCE A WEEK

## 2025-03-21 SDOH — HEALTH STABILITY: PHYSICAL HEALTH: ON AVERAGE, HOW MANY MINUTES DO YOU ENGAGE IN EXERCISE AT THIS LEVEL?: 0 MIN

## 2025-03-21 SDOH — HEALTH STABILITY: PHYSICAL HEALTH: ON AVERAGE, HOW MANY DAYS PER WEEK DO YOU ENGAGE IN MODERATE TO STRENUOUS EXERCISE (LIKE A BRISK WALK)?: 0 DAYS

## 2025-03-21 SDOH — ECONOMIC STABILITY: HOUSING INSECURITY: IN THE LAST 12 MONTHS, WAS THERE A TIME WHEN YOU WERE NOT ABLE TO PAY THE MORTGAGE OR RENT ON TIME?: NO

## 2025-03-21 SDOH — SOCIAL STABILITY: SOCIAL INSECURITY: ARE YOU MARRIED, WIDOWED, DIVORCED, SEPARATED, NEVER MARRIED, OR LIVING WITH A PARTNER?: MARRIED

## 2025-03-21 SDOH — SOCIAL STABILITY: SOCIAL NETWORK: HOW OFTEN DO YOU ATTEND CHURCH OR RELIGIOUS SERVICES?: NEVER

## 2025-03-21 SDOH — HEALTH STABILITY: MENTAL HEALTH
DO YOU FEEL STRESS - TENSE, RESTLESS, NERVOUS, OR ANXIOUS, OR UNABLE TO SLEEP AT NIGHT BECAUSE YOUR MIND IS TROUBLED ALL THE TIME - THESE DAYS?: NOT AT ALL

## 2025-03-21 SDOH — SOCIAL STABILITY: SOCIAL NETWORK: HOW OFTEN DO YOU GET TOGETHER WITH FRIENDS OR RELATIVES?: NEVER

## 2025-03-21 ASSESSMENT — PATIENT HEALTH QUESTIONNAIRE - PHQ9
2. FEELING DOWN, DEPRESSED OR HOPELESS: NOT AT ALL
SUM OF ALL RESPONSES TO PHQ QUESTIONS 1-9: 1
SUM OF ALL RESPONSES TO PHQ QUESTIONS 1-9: 1
4. FEELING TIRED OR HAVING LITTLE ENERGY: SEVERAL DAYS
5. POOR APPETITE OR OVEREATING: NOT AT ALL
3. TROUBLE FALLING OR STAYING ASLEEP: NOT AT ALL
9. THOUGHTS THAT YOU WOULD BE BETTER OFF DEAD, OR OF HURTING YOURSELF: NOT AT ALL
SUM OF ALL RESPONSES TO PHQ QUESTIONS 1-9: 1
SUM OF ALL RESPONSES TO PHQ QUESTIONS 1-9: 1
7. TROUBLE CONCENTRATING ON THINGS, SUCH AS READING THE NEWSPAPER OR WATCHING TELEVISION: NOT AT ALL
6. FEELING BAD ABOUT YOURSELF - OR THAT YOU ARE A FAILURE OR HAVE LET YOURSELF OR YOUR FAMILY DOWN: NOT AT ALL
1. LITTLE INTEREST OR PLEASURE IN DOING THINGS: NOT AT ALL
10. IF YOU CHECKED OFF ANY PROBLEMS, HOW DIFFICULT HAVE THESE PROBLEMS MADE IT FOR YOU TO DO YOUR WORK, TAKE CARE OF THINGS AT HOME, OR GET ALONG WITH OTHER PEOPLE: NOT DIFFICULT AT ALL
8. MOVING OR SPEAKING SO SLOWLY THAT OTHER PEOPLE COULD HAVE NOTICED. OR THE OPPOSITE, BEING SO FIGETY OR RESTLESS THAT YOU HAVE BEEN MOVING AROUND A LOT MORE THAN USUAL: NOT AT ALL

## 2025-03-21 ASSESSMENT — ACTIVITIES OF DAILY LIVING (ADL): LACK_OF_TRANSPORTATION: NO

## 2025-03-21 NOTE — PROGRESS NOTES
2 SPRAYS INTO EACH NOSTRIL ONCE DAILY AS DIRECTED Yes Jesus Alberto Hughes DO   XTANDI 40 MG TABS Take 40 mg by mouth daily 4 tabs daily Yes Yousif Echeverria MD   aspirin EC 81 MG EC tablet Take 1 tablet by mouth daily Yes Jesus Alberto Hughes DO   leuprolide acetate, 4 Month, (ELIGARD) 30 MG injection Inject into the skin every 6 months Yes Youisf Echeverria MD   ketorolac (ACULAR) 0.5 % ophthalmic solution Place 1 drop into both eyes 4 times daily Yes Kasey Laird DO   Psyllium (METAMUCIL FIBER PO) Take by mouth Yes Yousif Echeverria MD   amphetamine-dextroamphetamine (ADDERALL) 30 MG tablet Take 1 tablet by mouth as needed. Indications: take 1 tablet in the afternoon Yes Yousif Echeverria MD   lisdexamfetamine (VYVANSE) 70 MG capsule Take 1 capsule by mouth daily. Yes Yousif Echeverria MD   mineral oil-hydrophilic petrolatum (AQUAPHOR) ointment Apply topically as needed. Yes Jessica Almendarez MD       [facility] (Including outside providers/suppliers regularly involved in providing care):   Patient Care Team:  Jesus Alberto Hughes DO as PCP - General (Family Medicine)  Jesus Alberto Hughes DO as PCP - Empaneled Provider  Shadia Daniel MD as Surgeon (Neurosurgery)  Job Busby MD as Consulting Physician (Neurosurgery)  Eligio Berg MD as Consulting Physician (Cardiology)     Recommendations for Preventive Services Due: see orders and patient instructions/AVS.  Recommended screening schedule for the next 5-10 years is provided to the patient in written form: see Patient Instructions/AVS.     Reviewed and updated this visit:  Tobacco  Allergies  Meds  Problems  Med Hx  Surg Hx  Fam Hx  Sexual   Hx

## 2025-03-21 NOTE — PATIENT INSTRUCTIONS
instruction, always ask your healthcare professional. [place], [facility], disclaims any warranty or liability for your use of this information.    Personalized Preventive Plan for Rick Ivey - 3/21/2025  Medicare offers a range of preventive health benefits. Some of the tests and screenings are paid in full while other may be subject to a deductible, co-insurance, and/or copay.  Some of these benefits include a comprehensive review of your medical history including lifestyle, illnesses that may run in your family, and various assessments and screenings as appropriate.  After reviewing your medical record and screening and assessments performed today your provider may have ordered immunizations, labs, imaging, and/or referrals for you.  A list of these orders (if applicable) as well as your Preventive Care list are included within your After Visit Summary for your review.

## 2025-03-22 ENCOUNTER — RESULTS FOLLOW-UP (OUTPATIENT)
Dept: FAMILY MEDICINE CLINIC | Age: 65
End: 2025-03-22

## 2025-03-22 NOTE — RESULT ENCOUNTER NOTE
Let patient know that his urine albumin was elevated at 34, urine creatinine elevated 314.5.  However his microalbumin creatinine ratio was normal at 11.  No further workup needed at this time.

## 2025-04-23 DIAGNOSIS — I10 ESSENTIAL HYPERTENSION: ICD-10-CM

## 2025-04-23 RX ORDER — HYDRALAZINE HYDROCHLORIDE 25 MG/1
25 TABLET, FILM COATED ORAL 3 TIMES DAILY
Qty: 360 TABLET | Refills: 1 | Status: SHIPPED | OUTPATIENT
Start: 2025-04-23 | End: 2025-12-19

## 2025-04-23 RX ORDER — METOPROLOL SUCCINATE 50 MG/1
50 TABLET, EXTENDED RELEASE ORAL 2 TIMES DAILY
Qty: 180 TABLET | Refills: 1 | Status: SHIPPED | OUTPATIENT
Start: 2025-04-23 | End: 2025-10-20

## 2025-04-23 NOTE — TELEPHONE ENCOUNTER
Name of Medication(s) Requested:  hydrALAZINE (APRESOLINE) 25 MG tablet     metoprolol succinate (TOPROL XL) 50 MG extended release tablet         Medication is on current medication list Yes    Dosage and directions were verified? Yes    Quantity verified: 90 day supply     Pharmacy Verified?  Yes [facility]    Last Appointment:  3/21/2025    Future appts:  Future Appointments   Date Time Provider Department Center   6/3/2025  2:00 PM Stanley Alvarenga MD [place] [facility]   6/23/2025  1:15 PM Jesus Alberto Hughes DO [place] [facility] DEP   3/23/2026  1:00 PM Jesus Alberto Hughes DO [place] [facility]        (If no appt send self scheduling link. .REFILLAPPT)  Scheduling request sent?     [] Yes  [x] No    Does patient need updated?  [] Yes  [x] No

## 2025-06-23 ENCOUNTER — OFFICE VISIT (OUTPATIENT)
Dept: FAMILY MEDICINE CLINIC | Age: 65
End: 2025-06-23
Payer: COMMERCIAL

## 2025-06-23 VITALS
RESPIRATION RATE: 18 BRPM | WEIGHT: 251 LBS | HEART RATE: 80 BPM | DIASTOLIC BLOOD PRESSURE: 81 MMHG | TEMPERATURE: 97 F | HEIGHT: 69 IN | OXYGEN SATURATION: 94 % | SYSTOLIC BLOOD PRESSURE: 133 MMHG | BODY MASS INDEX: 37.18 KG/M2

## 2025-06-23 DIAGNOSIS — E66.09 CLASS 2 OBESITY DUE TO EXCESS CALORIES WITHOUT SERIOUS COMORBIDITY WITH BODY MASS INDEX (BMI) OF 37.0 TO 37.9 IN ADULT: ICD-10-CM

## 2025-06-23 DIAGNOSIS — C61 PROSTATE CANCER METASTATIC TO PELVIS (HCC): ICD-10-CM

## 2025-06-23 DIAGNOSIS — E66.812 CLASS 2 OBESITY DUE TO EXCESS CALORIES WITHOUT SERIOUS COMORBIDITY WITH BODY MASS INDEX (BMI) OF 37.0 TO 37.9 IN ADULT: ICD-10-CM

## 2025-06-23 DIAGNOSIS — E11.9 TYPE 2 DIABETES MELLITUS WITHOUT COMPLICATION, WITHOUT LONG-TERM CURRENT USE OF INSULIN (HCC): Primary | ICD-10-CM

## 2025-06-23 DIAGNOSIS — I10 ESSENTIAL HYPERTENSION: ICD-10-CM

## 2025-06-23 DIAGNOSIS — E55.9 VITAMIN D DEFICIENCY: ICD-10-CM

## 2025-06-23 DIAGNOSIS — E78.2 MIXED HYPERLIPIDEMIA: ICD-10-CM

## 2025-06-23 DIAGNOSIS — G47.33 OSA ON CPAP: ICD-10-CM

## 2025-06-23 DIAGNOSIS — C79.89 PROSTATE CANCER METASTATIC TO PELVIS (HCC): ICD-10-CM

## 2025-06-23 LAB — HBA1C MFR BLD: 5.1 %

## 2025-06-23 PROCEDURE — 2022F DILAT RTA XM EVC RTNOPTHY: CPT | Performed by: NEUROMUSCULOSKELETAL MEDICINE & OMM

## 2025-06-23 PROCEDURE — 99214 OFFICE O/P EST MOD 30 MIN: CPT | Performed by: NEUROMUSCULOSKELETAL MEDICINE & OMM

## 2025-06-23 PROCEDURE — 3075F SYST BP GE 130 - 139MM HG: CPT | Performed by: NEUROMUSCULOSKELETAL MEDICINE & OMM

## 2025-06-23 PROCEDURE — 1036F TOBACCO NON-USER: CPT | Performed by: NEUROMUSCULOSKELETAL MEDICINE & OMM

## 2025-06-23 PROCEDURE — G8427 DOCREV CUR MEDS BY ELIG CLIN: HCPCS | Performed by: NEUROMUSCULOSKELETAL MEDICINE & OMM

## 2025-06-23 PROCEDURE — G8417 CALC BMI ABV UP PARAM F/U: HCPCS | Performed by: NEUROMUSCULOSKELETAL MEDICINE & OMM

## 2025-06-23 PROCEDURE — 3017F COLORECTAL CA SCREEN DOC REV: CPT | Performed by: NEUROMUSCULOSKELETAL MEDICINE & OMM

## 2025-06-23 PROCEDURE — 3044F HG A1C LEVEL LT 7.0%: CPT | Performed by: NEUROMUSCULOSKELETAL MEDICINE & OMM

## 2025-06-23 PROCEDURE — 3079F DIAST BP 80-89 MM HG: CPT | Performed by: NEUROMUSCULOSKELETAL MEDICINE & OMM

## 2025-06-23 PROCEDURE — 83036 HEMOGLOBIN GLYCOSYLATED A1C: CPT | Performed by: NEUROMUSCULOSKELETAL MEDICINE & OMM

## 2025-06-23 ASSESSMENT — ENCOUNTER SYMPTOMS
CHOKING: 0
SHORTNESS OF BREATH: 0
CHEST TIGHTNESS: 0
CONSTIPATION: 0
BLOOD IN STOOL: 0
NAUSEA: 0
COUGH: 0
ANAL BLEEDING: 0
BACK PAIN: 0
DIARRHEA: 0
VOMITING: 0
ABDOMINAL PAIN: 0
WHEEZING: 0

## 2025-06-26 DIAGNOSIS — K21.9 GASTROESOPHAGEAL REFLUX DISEASE WITHOUT ESOPHAGITIS: ICD-10-CM

## 2025-06-26 DIAGNOSIS — Z76.0 MEDICATION REFILL: ICD-10-CM

## 2025-06-27 DIAGNOSIS — K59.04 CHRONIC IDIOPATHIC CONSTIPATION: ICD-10-CM

## 2025-06-27 RX ORDER — DOCUSATE SODIUM 100 MG/1
100 CAPSULE, LIQUID FILLED ORAL 2 TIMES DAILY PRN
Qty: 180 CAPSULE | Refills: 1 | Status: SHIPPED | OUTPATIENT
Start: 2025-06-27

## 2025-06-27 RX ORDER — LORATADINE 10 MG/1
10 TABLET ORAL DAILY
Qty: 90 TABLET | Refills: 1 | Status: SHIPPED | OUTPATIENT
Start: 2025-06-27

## 2025-06-27 RX ORDER — FAMOTIDINE 40 MG/1
40 TABLET, FILM COATED ORAL EVERY EVENING
Qty: 90 TABLET | Refills: 1 | Status: SHIPPED | OUTPATIENT
Start: 2025-06-27

## 2025-06-27 NOTE — TELEPHONE ENCOUNTER
Name of Medication(s) Requested:  Requested Prescriptions     Pending Prescriptions Disp Refills    loratadine (CLARITIN) 10 MG tablet 90 tablet 1     Sig: Take 1 tablet by mouth daily       Medication is on current medication list Yes    Dosage and directions were verified? Yes    Quantity verified: 30 day supply     Pharmacy Verified?  Yes    Last Appointment:  6/23/2025    Future appts:  Future Appointments   Date Time Provider Department Center   9/24/2025  3:00 PM Jesus Alberto Hughes DO [place] [facility] DE[facility]   3/23/2026  1:00 PM Jesus Alberto Hughes DO [place] [facility] DE[facility]        (If no appt send self scheduling link. .REFILLAPPT)  Scheduling request sent?     [] Yes  [x] No    Does patient need updated?  [] Yes  [x] No

## 2025-06-27 NOTE — TELEPHONE ENCOUNTER
Name of Medication(s) Requested:  Requested Prescriptions     Pending Prescriptions Disp Refills    famotidine (PEPCID) 40 MG tablet 90 tablet 1     Sig: Take 1 tablet by mouth every evening       Medication is on current medication list Yes    Dosage and directions were verified? Yes    Quantity verified: 90 day supply     Pharmacy Verified?  Yes    Last Appointment:  6/23/2025    Future appts:  Future Appointments   Date Time Provider Department Center   9/24/2025  3:00 PM Jesus Alberto Hughes DO [place] [facility]   3/23/2026  1:00 PM Jesus Alberto Hughes DO [place] [facility]        (If no appt send self scheduling link. .REFILLAPPT)  Scheduling request sent?     [] Yes  [x] No    Does patient need updated?  [] Yes  [x] No

## 2025-06-27 NOTE — TELEPHONE ENCOUNTER
Name of Medication(s) Requested:  Requested Prescriptions     Pending Prescriptions Disp Refills    docusate sodium (DOK) 100 MG capsule 180 capsule 1     Sig: Take 1 capsule by mouth 2 times daily as needed for Constipation       Medication is on current medication list Yes    Dosage and directions were verified? Yes    Quantity verified: 90 day supply     Pharmacy Verified?  Yes    Last Appointment:  6/23/2025    Future appts:  Future Appointments   Date Time Provider Department Center   9/24/2025  3:00 PM Jesus Alberto Hughes DO [place] [facility] DEP   3/23/2026  1:00 PM Jesus Alberto Hughes DO [place] [facility] DE[facility]        (If no appt send self scheduling link. .REFILLAPPT)  Scheduling request sent?     [] Yes  [x] No    Does patient need updated?  [] Yes  [x] No

## 2025-07-08 DIAGNOSIS — E11.9 TYPE 2 DIABETES MELLITUS WITHOUT COMPLICATION, WITHOUT LONG-TERM CURRENT USE OF INSULIN (HCC): ICD-10-CM

## 2025-07-08 DIAGNOSIS — E66.01 SEVERE OBESITY (BMI 35.0-39.9) WITH COMORBIDITY (HCC): ICD-10-CM

## 2025-07-08 RX ORDER — TIRZEPATIDE 10 MG/.5ML
10 INJECTION, SOLUTION SUBCUTANEOUS WEEKLY
Qty: 2 ML | Refills: 3 | Status: SHIPPED | OUTPATIENT
Start: 2025-07-08

## 2025-07-08 NOTE — TELEPHONE ENCOUNTER
Name of Medication(s) Requested:  Requested Prescriptions     Pending Prescriptions Disp Refills    Tirzepatide (MOUNJARO) 10 MG/0.5ML SOAJ pen 2 mL 3     Sig: Inject 10 mg into the skin once a week       Medication is on current medication list Yes    Dosage and directions were verified? Yes    Quantity verified: 30 day supply     Pharmacy Verified?  Yes    Last Appointment:  6/23/2025    Future appts:  Future Appointments   Date Time Provider Department Center   9/24/2025  3:00 PM Jesus Alberto Hughes DO [place] [facility] DEP   3/23/2026  1:00 PM Jesus Alberto Hughes DO [place] [facility] DE[facility]        (If no appt send self scheduling link. .REFILLAPPT)  Scheduling request sent?     [] Yes  [x] No    Does patient need updated?  [] Yes  [x] No

## 2025-07-14 ENCOUNTER — TELEPHONE (OUTPATIENT)
Age: 65
End: 2025-07-14

## 2025-08-28 DIAGNOSIS — J30.2 SEASONAL ALLERGIC RHINITIS, UNSPECIFIED TRIGGER: ICD-10-CM

## 2025-08-28 DIAGNOSIS — Z76.0 MEDICATION REFILL: ICD-10-CM

## 2025-08-28 DIAGNOSIS — I10 ESSENTIAL HYPERTENSION: ICD-10-CM

## 2025-08-28 DIAGNOSIS — E78.2 MIXED HYPERLIPIDEMIA: ICD-10-CM

## 2025-08-28 RX ORDER — LOSARTAN POTASSIUM 100 MG/1
TABLET ORAL
Qty: 90 TABLET | Refills: 3 | Status: SHIPPED | OUTPATIENT
Start: 2025-08-28

## 2025-08-28 RX ORDER — ALBUTEROL SULFATE 90 UG/1
2 INHALANT RESPIRATORY (INHALATION) 4 TIMES DAILY PRN
Qty: 18 G | Refills: 3 | Status: SHIPPED | OUTPATIENT
Start: 2025-08-28

## 2025-08-28 RX ORDER — ATORVASTATIN CALCIUM 40 MG/1
40 TABLET, FILM COATED ORAL DAILY
Qty: 90 TABLET | Refills: 3 | Status: SHIPPED | OUTPATIENT
Start: 2025-08-28

## 2025-08-28 RX ORDER — FLUTICASONE PROPIONATE 50 MCG
SPRAY, SUSPENSION (ML) NASAL
Qty: 16 G | Refills: 3 | Status: SHIPPED | OUTPATIENT
Start: 2025-08-28

## (undated) DEVICE — KIT POS W/ FOAM ARM CRADL SHEARGUARD CHST PD CVR FOR SPNL

## (undated) DEVICE — TUBING SUCT 12FR MAL ALUM SHFT FN CAP VENT UNIV CONN W/ OBT

## (undated) DEVICE — 1.5L THIN WALL CAN: Brand: CRD

## (undated) DEVICE — GLOVE SURG SZ 65 THK91MIL LTX FREE SYN POLYISOPRENE

## (undated) DEVICE — READY WET SKIN SCRUB TRAY-LF: Brand: MEDLINE INDUSTRIES, INC.

## (undated) DEVICE — 1000 S-DRAPE TOWEL DRAPE 10/BX: Brand: STERI-DRAPE™

## (undated) DEVICE — BLADE ES L6IN ELASTOMERIC COAT EXT DURABLE BEND UPTO 90DEG

## (undated) DEVICE — TUBING, SUCTION, 3/16" X 12', STRAIGHT: Brand: MEDLINE

## (undated) DEVICE — EXTRAS UGOKWE

## (undated) DEVICE — 3M™ IOBAN™ 2 ANTIMICROBIAL INCISE DRAPE 6650EZ: Brand: IOBAN™ 2

## (undated) DEVICE — DEVICE NEUROSTIMULATOR W2.9XL3.1IN THK0.8IN 71GM

## (undated) DEVICE — DURASEAL® EXACT SPINAL SEALANT SYSTEM 5ML 5 PACK: Brand: DURASEAL EXACT SPINAL SEALANT SYSTEM

## (undated) DEVICE — PEN: MARKING STD 100/CS: Brand: MEDICAL ACTION INDUSTRIES

## (undated) DEVICE — SURGICAL PROCEDURE PACK LAMINECTOMY LUMBAR

## (undated) DEVICE — SYSTEM RECHARGING NEUROSTIMULATOR RECHRG BTTRY PK PWR SUPL

## (undated) DEVICE — CATHETER 8591-38 PASSER,38CM

## (undated) DEVICE — SET SPINAL NEURO STNEU1

## (undated) DEVICE — LABEL MED 4 IN SURG PANEL W/ PEN STRL

## (undated) DEVICE — SYRINGE MED 20ML STD CLR PLAS LUERLOCK TIP N CTRL DISP

## (undated) DEVICE — DRAPE CAM W7XL96IN W/ BLU KRATON TIP FOR LSR VID

## (undated) DEVICE — Z CONVERTED USE 2275207 CLOTH PREP W7.5XL7.5IN 2% CHG SKIN ALC AND RNS FREE

## (undated) DEVICE — SOLUTION IV IRRIG WATER 1000ML POUR BRL 2F7114

## (undated) DEVICE — DOUBLE BASIN SET: Brand: MEDLINE INDUSTRIES, INC.

## (undated) DEVICE — GLOVE SURG SZ 85 STD WHT LTX SYN POLYMER BEAD REINF ANTI RL

## (undated) DEVICE — 3M(TM) MEDIPORE(TM) +PAD SOFT CLOTH ADHESIVE WOUND DRESSING 3569: Brand: 3M™ MEDIPORE™

## (undated) DEVICE — CODMAN® SURGICAL PATTIES 1/2" X 1" (1.27CM X 2.54CM): Brand: CODMAN®

## (undated) DEVICE — BLADE CLP TAPR HD WET DRY CAPABILITY GTT IN CHARGING USE

## (undated) DEVICE — INTENDED FOR TISSUE SEPARATION, AND OTHER PROCEDURES THAT REQUIRE A SHARP SURGICAL BLADE TO PUNCTURE OR CUT.: Brand: BARD-PARKER ® STAINLESS STEEL BLADES

## (undated) DEVICE — STAPLER SKIN L39MM DIA0.53MM CRWN 5.7MM S STL FIX HD PROX

## (undated) DEVICE — GLOVE ORANGE PI 7   MSG9070

## (undated) DEVICE — APPLICATOR PREP 26ML 0.7% IOD POVACRYLEX 74% ISO ALC ST

## (undated) DEVICE — GRADUATE

## (undated) DEVICE — CONTROLLER NEUROSTIMULATOR HND HELD PRGMR WIRELESS PTM FOR

## (undated) DEVICE — SKIN AFFIX SURG ADHESIVE 72/CS 0.55ML: Brand: MEDLINE

## (undated) DEVICE — GLOVE ORANGE PI 8   MSG9080

## (undated) DEVICE — HYPODERMIC SAFETY NEEDLE: Brand: MAGELLAN